# Patient Record
Sex: FEMALE | Race: BLACK OR AFRICAN AMERICAN | NOT HISPANIC OR LATINO | URBAN - METROPOLITAN AREA
[De-identification: names, ages, dates, MRNs, and addresses within clinical notes are randomized per-mention and may not be internally consistent; named-entity substitution may affect disease eponyms.]

---

## 2017-01-31 ENCOUNTER — EMERGENCY (EMERGENCY)
Facility: HOSPITAL | Age: 32
LOS: 1 days | Discharge: HOME | End: 2017-01-31
Attending: EMERGENCY MEDICINE | Admitting: EMERGENCY MEDICINE
Payer: COMMERCIAL

## 2017-01-31 VITALS
HEART RATE: 81 BPM | OXYGEN SATURATION: 100 % | TEMPERATURE: 98 F | DIASTOLIC BLOOD PRESSURE: 87 MMHG | RESPIRATION RATE: 18 BRPM | SYSTOLIC BLOOD PRESSURE: 141 MMHG

## 2017-01-31 VITALS
SYSTOLIC BLOOD PRESSURE: 102 MMHG | OXYGEN SATURATION: 100 % | DIASTOLIC BLOOD PRESSURE: 66 MMHG | RESPIRATION RATE: 18 BRPM | HEART RATE: 69 BPM | TEMPERATURE: 98 F

## 2017-01-31 DIAGNOSIS — Z98.89 OTHER SPECIFIED POSTPROCEDURAL STATES: Chronic | ICD-10-CM

## 2017-01-31 LAB
ALBUMIN SERPL ELPH-MCNC: 4.3 G/DL — SIGNIFICANT CHANGE UP (ref 3.3–5)
ALP SERPL-CCNC: 40 U/L — SIGNIFICANT CHANGE UP (ref 40–120)
ALT FLD-CCNC: 10 U/L — SIGNIFICANT CHANGE UP (ref 4–33)
APPEARANCE UR: CLEAR — SIGNIFICANT CHANGE UP
AST SERPL-CCNC: 12 U/L — SIGNIFICANT CHANGE UP (ref 4–32)
BASOPHILS # BLD AUTO: 0.02 K/UL — SIGNIFICANT CHANGE UP (ref 0–0.2)
BASOPHILS NFR BLD AUTO: 0.3 % — SIGNIFICANT CHANGE UP (ref 0–2)
BILIRUB SERPL-MCNC: 0.8 MG/DL — SIGNIFICANT CHANGE UP (ref 0.2–1.2)
BILIRUB UR-MCNC: NEGATIVE — SIGNIFICANT CHANGE UP
BLD GP AB SCN SERPL QL: NEGATIVE — SIGNIFICANT CHANGE UP
BLOOD UR QL VISUAL: HIGH
BUN SERPL-MCNC: 13 MG/DL — SIGNIFICANT CHANGE UP (ref 7–23)
CALCIUM SERPL-MCNC: 9.5 MG/DL — SIGNIFICANT CHANGE UP (ref 8.4–10.5)
CHLORIDE SERPL-SCNC: 103 MMOL/L — SIGNIFICANT CHANGE UP (ref 98–107)
CO2 SERPL-SCNC: 26 MMOL/L — SIGNIFICANT CHANGE UP (ref 22–31)
COLOR SPEC: YELLOW — SIGNIFICANT CHANGE UP
CREAT SERPL-MCNC: 0.79 MG/DL — SIGNIFICANT CHANGE UP (ref 0.5–1.3)
EOSINOPHIL # BLD AUTO: 0.07 K/UL — SIGNIFICANT CHANGE UP (ref 0–0.5)
EOSINOPHIL NFR BLD AUTO: 1.1 % — SIGNIFICANT CHANGE UP (ref 0–6)
GLUCOSE SERPL-MCNC: 91 MG/DL — SIGNIFICANT CHANGE UP (ref 70–99)
GLUCOSE UR-MCNC: NEGATIVE — SIGNIFICANT CHANGE UP
HCG SERPL-ACNC: < 5 MIU/ML — SIGNIFICANT CHANGE UP
HCT VFR BLD CALC: 41.7 % — SIGNIFICANT CHANGE UP (ref 34.5–45)
HGB BLD-MCNC: 14.1 G/DL — SIGNIFICANT CHANGE UP (ref 11.5–15.5)
IMM GRANULOCYTES NFR BLD AUTO: 0.2 % — SIGNIFICANT CHANGE UP (ref 0–1.5)
KETONES UR-MCNC: NEGATIVE — SIGNIFICANT CHANGE UP
LEUKOCYTE ESTERASE UR-ACNC: NEGATIVE — SIGNIFICANT CHANGE UP
LYMPHOCYTES # BLD AUTO: 2.32 K/UL — SIGNIFICANT CHANGE UP (ref 1–3.3)
LYMPHOCYTES # BLD AUTO: 35.9 % — SIGNIFICANT CHANGE UP (ref 13–44)
MCHC RBC-ENTMCNC: 30.2 PG — SIGNIFICANT CHANGE UP (ref 27–34)
MCHC RBC-ENTMCNC: 33.8 % — SIGNIFICANT CHANGE UP (ref 32–36)
MCV RBC AUTO: 89.3 FL — SIGNIFICANT CHANGE UP (ref 80–100)
MONOCYTES # BLD AUTO: 0.32 K/UL — SIGNIFICANT CHANGE UP (ref 0–0.9)
MONOCYTES NFR BLD AUTO: 4.9 % — SIGNIFICANT CHANGE UP (ref 2–14)
NEUTROPHILS # BLD AUTO: 3.73 K/UL — SIGNIFICANT CHANGE UP (ref 1.8–7.4)
NEUTROPHILS NFR BLD AUTO: 57.6 % — SIGNIFICANT CHANGE UP (ref 43–77)
NITRITE UR-MCNC: NEGATIVE — SIGNIFICANT CHANGE UP
PH UR: 6 — SIGNIFICANT CHANGE UP (ref 4.6–8)
PLATELET # BLD AUTO: 187 K/UL — SIGNIFICANT CHANGE UP (ref 150–400)
PMV BLD: 11 FL — SIGNIFICANT CHANGE UP (ref 7–13)
POTASSIUM SERPL-MCNC: 4.3 MMOL/L — SIGNIFICANT CHANGE UP (ref 3.5–5.3)
POTASSIUM SERPL-SCNC: 4.3 MMOL/L — SIGNIFICANT CHANGE UP (ref 3.5–5.3)
PROT SERPL-MCNC: 6.9 G/DL — SIGNIFICANT CHANGE UP (ref 6–8.3)
PROT UR-MCNC: NEGATIVE — SIGNIFICANT CHANGE UP
RBC # BLD: 4.67 M/UL — SIGNIFICANT CHANGE UP (ref 3.8–5.2)
RBC # FLD: 11.6 % — SIGNIFICANT CHANGE UP (ref 10.3–14.5)
RBC CASTS # UR COMP ASSIST: SIGNIFICANT CHANGE UP (ref 0–?)
RH IG SCN BLD-IMP: POSITIVE — SIGNIFICANT CHANGE UP
SODIUM SERPL-SCNC: 139 MMOL/L — SIGNIFICANT CHANGE UP (ref 135–145)
SP GR SPEC: 1.02 — SIGNIFICANT CHANGE UP (ref 1–1.03)
SQUAMOUS # UR AUTO: SIGNIFICANT CHANGE UP
UROBILINOGEN FLD QL: NORMAL E.U. — SIGNIFICANT CHANGE UP (ref 0.1–0.2)
WBC # BLD: 6.47 K/UL — SIGNIFICANT CHANGE UP (ref 3.8–10.5)
WBC # FLD AUTO: 6.47 K/UL — SIGNIFICANT CHANGE UP (ref 3.8–10.5)
WBC UR QL: SIGNIFICANT CHANGE UP (ref 0–?)

## 2017-01-31 PROCEDURE — 99284 EMERGENCY DEPT VISIT MOD MDM: CPT

## 2017-01-31 PROCEDURE — 76830 TRANSVAGINAL US NON-OB: CPT | Mod: 26

## 2017-01-31 RX ORDER — ACETAMINOPHEN 500 MG
650 TABLET ORAL ONCE
Qty: 0 | Refills: 0 | Status: COMPLETED | OUTPATIENT
Start: 2017-01-31 | End: 2017-01-31

## 2017-01-31 RX ORDER — SODIUM CHLORIDE 9 MG/ML
1000 INJECTION INTRAMUSCULAR; INTRAVENOUS; SUBCUTANEOUS ONCE
Qty: 0 | Refills: 0 | Status: COMPLETED | OUTPATIENT
Start: 2017-01-31 | End: 2017-01-31

## 2017-01-31 RX ADMIN — Medication 650 MILLIGRAM(S): at 16:20

## 2017-01-31 RX ADMIN — SODIUM CHLORIDE 1000 MILLILITER(S): 9 INJECTION INTRAMUSCULAR; INTRAVENOUS; SUBCUTANEOUS at 13:50

## 2017-01-31 RX ADMIN — Medication 650 MILLIGRAM(S): at 13:50

## 2017-01-31 NOTE — ED ADULT NURSE NOTE - OBJECTIVE STATEMENT
pt states her period was 6 days late and when it started there wasn't very much bleeding. pt states she took home pregnancy test and it was negative. c.o. nausea. denies vomiting, diarrhea and fevers. abd soft, c.o. "pressure " upon palpation. most tender on LLQ. sl placed labs sent.

## 2017-01-31 NOTE — ED PROVIDER NOTE - NS ED MD SCRIBE ATTENDING SCRIBE SECTIONS
REVIEW OF SYSTEMS/HIV/VITAL SIGNS( Pullset)/HISTORY OF PRESENT ILLNESS/PHYSICAL EXAM/PAST MEDICAL/SURGICAL/SOCIAL HISTORY/DISPOSITION

## 2017-01-31 NOTE — ED PROVIDER NOTE - OBJECTIVE STATEMENT
32 y/o F pt () w/ PMHx of Anxiety, Breech presentation and Ovarian cyst c/o delayed menstrual cycle by 6 days w/ nausea, vaginal spotting, left pelvic pain and vaginal discharge (pink in color) since yesterday. Pt states current bleeding is unlike her nml menstrual period. One sexual partner w/ no protections used. Has had negative home pregnancy test, but concerned due to unusual bleeding. No h/o ectopic pregnancy. Denies fever, chills, dysuria, vomiting, CP, SOB, cough or any other complaints. Allergic to Penicillin and Amoxicillin.

## 2017-01-31 NOTE — ED PROVIDER NOTE - PROGRESS NOTE DETAILS
William FELICIANO- pt reassured of no serious condition, likely dysmenorrhea and is not pregnant and Pt feels better, advised to take motrin and tylenol as needed for pain and return promptly in c/o worsening symptoms

## 2017-04-19 ENCOUNTER — EMERGENCY (EMERGENCY)
Facility: HOSPITAL | Age: 32
LOS: 0 days | Discharge: HOME | End: 2017-04-19
Attending: EMERGENCY MEDICINE
Payer: COMMERCIAL

## 2017-04-19 VITALS
HEART RATE: 74 BPM | HEIGHT: 63 IN | WEIGHT: 141.98 LBS | SYSTOLIC BLOOD PRESSURE: 124 MMHG | RESPIRATION RATE: 19 BRPM | OXYGEN SATURATION: 99 % | TEMPERATURE: 99 F | DIASTOLIC BLOOD PRESSURE: 74 MMHG

## 2017-04-19 VITALS
OXYGEN SATURATION: 98 % | HEART RATE: 88 BPM | TEMPERATURE: 98 F | SYSTOLIC BLOOD PRESSURE: 111 MMHG | RESPIRATION RATE: 16 BRPM | DIASTOLIC BLOOD PRESSURE: 63 MMHG

## 2017-04-19 DIAGNOSIS — R10.9 UNSPECIFIED ABDOMINAL PAIN: ICD-10-CM

## 2017-04-19 DIAGNOSIS — Z88.1 ALLERGY STATUS TO OTHER ANTIBIOTIC AGENTS STATUS: ICD-10-CM

## 2017-04-19 DIAGNOSIS — Z98.89 OTHER SPECIFIED POSTPROCEDURAL STATES: Chronic | ICD-10-CM

## 2017-04-19 DIAGNOSIS — R10.32 LEFT LOWER QUADRANT PAIN: ICD-10-CM

## 2017-04-19 LAB
ALBUMIN SERPL ELPH-MCNC: 4 G/DL — SIGNIFICANT CHANGE UP (ref 3.3–5)
ALP SERPL-CCNC: 47 U/L — SIGNIFICANT CHANGE UP (ref 40–120)
ALT FLD-CCNC: 24 U/L — SIGNIFICANT CHANGE UP (ref 12–78)
ANION GAP SERPL CALC-SCNC: 11 MMOL/L — SIGNIFICANT CHANGE UP (ref 5–17)
APPEARANCE UR: CLEAR — SIGNIFICANT CHANGE UP
APTT BLD: 32.6 SEC — SIGNIFICANT CHANGE UP (ref 27.5–37.4)
AST SERPL-CCNC: 15 U/L — SIGNIFICANT CHANGE UP (ref 15–37)
BASOPHILS # BLD AUTO: 0.1 K/UL — SIGNIFICANT CHANGE UP (ref 0–0.2)
BASOPHILS NFR BLD AUTO: 1.1 % — SIGNIFICANT CHANGE UP (ref 0–2)
BILIRUB SERPL-MCNC: 0.6 MG/DL — SIGNIFICANT CHANGE UP (ref 0.2–1.2)
BILIRUB UR-MCNC: NEGATIVE — SIGNIFICANT CHANGE UP
BUN SERPL-MCNC: 14 MG/DL — SIGNIFICANT CHANGE UP (ref 7–23)
CALCIUM SERPL-MCNC: 8.7 MG/DL — SIGNIFICANT CHANGE UP (ref 8.5–10.1)
CHLORIDE SERPL-SCNC: 105 MMOL/L — SIGNIFICANT CHANGE UP (ref 96–108)
CO2 SERPL-SCNC: 25 MMOL/L — SIGNIFICANT CHANGE UP (ref 22–31)
COLOR SPEC: YELLOW — SIGNIFICANT CHANGE UP
CREAT SERPL-MCNC: 0.77 MG/DL — SIGNIFICANT CHANGE UP (ref 0.5–1.3)
DIFF PNL FLD: NEGATIVE — SIGNIFICANT CHANGE UP
EOSINOPHIL # BLD AUTO: 0.1 K/UL — SIGNIFICANT CHANGE UP (ref 0–0.5)
EOSINOPHIL NFR BLD AUTO: 0.9 % — SIGNIFICANT CHANGE UP (ref 0–6)
GLUCOSE SERPL-MCNC: 96 MG/DL — SIGNIFICANT CHANGE UP (ref 70–99)
GLUCOSE UR QL: NEGATIVE MG/DL — SIGNIFICANT CHANGE UP
HCG UR QL: NEGATIVE — SIGNIFICANT CHANGE UP
HCT VFR BLD CALC: 39.2 % — SIGNIFICANT CHANGE UP (ref 34.5–45)
HGB BLD-MCNC: 13.9 G/DL — SIGNIFICANT CHANGE UP (ref 11.5–15.5)
INR BLD: 0.97 RATIO — SIGNIFICANT CHANGE UP (ref 0.88–1.16)
KETONES UR-MCNC: NEGATIVE — SIGNIFICANT CHANGE UP
LEUKOCYTE ESTERASE UR-ACNC: NEGATIVE — SIGNIFICANT CHANGE UP
LIDOCAIN IGE QN: 91 U/L — SIGNIFICANT CHANGE UP (ref 73–393)
LYMPHOCYTES # BLD AUTO: 3.3 K/UL — SIGNIFICANT CHANGE UP (ref 1–3.3)
LYMPHOCYTES # BLD AUTO: 30.8 % — SIGNIFICANT CHANGE UP (ref 13–44)
MCHC RBC-ENTMCNC: 31 PG — SIGNIFICANT CHANGE UP (ref 27–34)
MCHC RBC-ENTMCNC: 35.5 GM/DL — SIGNIFICANT CHANGE UP (ref 32–36)
MCV RBC AUTO: 87.2 FL — SIGNIFICANT CHANGE UP (ref 80–100)
MONOCYTES # BLD AUTO: 0.7 K/UL — SIGNIFICANT CHANGE UP (ref 0–0.9)
MONOCYTES NFR BLD AUTO: 6.3 % — SIGNIFICANT CHANGE UP (ref 2–14)
NEUTROPHILS # BLD AUTO: 6.5 K/UL — SIGNIFICANT CHANGE UP (ref 1.8–7.4)
NEUTROPHILS NFR BLD AUTO: 61 % — SIGNIFICANT CHANGE UP (ref 43–77)
NITRITE UR-MCNC: NEGATIVE — SIGNIFICANT CHANGE UP
PH UR: 7 — SIGNIFICANT CHANGE UP (ref 5–8)
PLATELET # BLD AUTO: 193 K/UL — SIGNIFICANT CHANGE UP (ref 150–400)
POTASSIUM SERPL-MCNC: 3.6 MMOL/L — SIGNIFICANT CHANGE UP (ref 3.5–5.3)
POTASSIUM SERPL-SCNC: 3.6 MMOL/L — SIGNIFICANT CHANGE UP (ref 3.5–5.3)
PROT SERPL-MCNC: 7.1 GM/DL — SIGNIFICANT CHANGE UP (ref 6–8.3)
PROT UR-MCNC: NEGATIVE MG/DL — SIGNIFICANT CHANGE UP
PROTHROM AB SERPL-ACNC: 10.6 SEC — SIGNIFICANT CHANGE UP (ref 9.8–12.7)
RBC # BLD: 4.5 M/UL — SIGNIFICANT CHANGE UP (ref 3.8–5.2)
RBC # FLD: 11.6 % — SIGNIFICANT CHANGE UP (ref 11–15)
SODIUM SERPL-SCNC: 141 MMOL/L — SIGNIFICANT CHANGE UP (ref 135–145)
SP GR SPEC: 1.01 — SIGNIFICANT CHANGE UP (ref 1.01–1.02)
UROBILINOGEN FLD QL: 4 MG/DL
WBC # BLD: 10.7 K/UL — HIGH (ref 3.8–10.5)
WBC # FLD AUTO: 10.7 K/UL — HIGH (ref 3.8–10.5)

## 2017-04-19 PROCEDURE — 99284 EMERGENCY DEPT VISIT MOD MDM: CPT

## 2017-04-19 PROCEDURE — 76856 US EXAM PELVIC COMPLETE: CPT | Mod: 26

## 2017-04-19 RX ORDER — KETOROLAC TROMETHAMINE 30 MG/ML
15 SYRINGE (ML) INJECTION ONCE
Qty: 0 | Refills: 0 | Status: DISCONTINUED | OUTPATIENT
Start: 2017-04-19 | End: 2017-04-19

## 2017-04-19 RX ORDER — ACETAMINOPHEN 500 MG
650 TABLET ORAL ONCE
Qty: 0 | Refills: 0 | Status: COMPLETED | OUTPATIENT
Start: 2017-04-19 | End: 2017-04-19

## 2017-04-19 RX ORDER — CEFTRIAXONE 500 MG/1
250 INJECTION, POWDER, FOR SOLUTION INTRAMUSCULAR; INTRAVENOUS ONCE
Qty: 0 | Refills: 0 | Status: COMPLETED | OUTPATIENT
Start: 2017-04-19 | End: 2017-04-19

## 2017-04-19 RX ADMIN — Medication 15 MILLIGRAM(S): at 18:20

## 2017-04-19 RX ADMIN — Medication 650 MILLIGRAM(S): at 20:28

## 2017-04-19 RX ADMIN — Medication 650 MILLIGRAM(S): at 19:58

## 2017-04-19 RX ADMIN — Medication 100 MILLIGRAM(S): at 19:58

## 2017-04-19 RX ADMIN — Medication 15 MILLIGRAM(S): at 19:08

## 2017-04-19 RX ADMIN — CEFTRIAXONE 250 MILLIGRAM(S): 500 INJECTION, POWDER, FOR SOLUTION INTRAMUSCULAR; INTRAVENOUS at 19:58

## 2017-04-19 NOTE — ED ADULT NURSE NOTE - OBJECTIVE STATEMENT
Pt verbalized abd & flank pain since last week. Pt denies N/V/D. Frequent soft BMs with poor appetite. Pt reports severe nose bleed on Monday, which last for several minutes and saturated 2-3 napkins.

## 2017-04-19 NOTE — ED ADULT TRIAGE NOTE - CHIEF COMPLAINT QUOTE
pt complaining of right lower quadrant abdominal pain times 1 week. pt denies nausea, vomiting or diarrhea.

## 2017-04-19 NOTE — ED PROVIDER NOTE - OBJECTIVE STATEMENT
Pt. present to ED c/o lower abdominal pain on and off for the past week. Pain is sharp. No vaginal discharge. No vaginal bleeding. No dysuria. +Increase urinary frequency. Pt's pain is stronger in her suprapubic and LLQ region. No hx of STI. Pt. is  with 3 . Pt. also c/o right flank pain for the past 4-5 days.

## 2017-04-19 NOTE — ED PROVIDER NOTE - PROGRESS NOTE DETAILS
Pt. had pelvic exam that showed +CMT. Minimal yellow discharge noted. Pain mostly to Left adnexa. GC culture sent. Pt. may need to be tx for PID. Waiting for Pt. to go to [facility] Pt. will be tx for PID with Rocephin IM and Doxy. Pt. states that she had a rash when she was a baby with amoxillin. I believe that the benefit of proper treatment  outweighs the risk of a possible allergic reaction. Low cross reactivity with third generations cephalosporins.  Pt. still waiting to go to [facility] Case endorsed to Dr. Dillard. She will follow up [facility] Pt endorsed by Dr. Richard, pending US results.  US demonstrated hemorrhagic ovarian cyst, no TOA, no torsion.  Discussed results and outcome of testing with the patient, given copy as well.  Patient advised to please follow up with their primary care doctor within the next 24 hours and return to the Emergency Department for worsening symptoms or any other concerns.  Patient advised that their doctor may call  to follow up on the specific results of the tests performed today in the emergency department. Given GYN fu as well.

## 2017-04-20 LAB
C TRACH RRNA SPEC QL NAA+PROBE: SIGNIFICANT CHANGE UP
N GONORRHOEA RRNA SPEC QL NAA+PROBE: SIGNIFICANT CHANGE UP
SPECIMEN SOURCE: SIGNIFICANT CHANGE UP

## 2017-06-07 ENCOUNTER — EMERGENCY (EMERGENCY)
Facility: HOSPITAL | Age: 32
LOS: 1 days | Discharge: HOME | End: 2017-06-07
Attending: EMERGENCY MEDICINE | Admitting: EMERGENCY MEDICINE
Payer: COMMERCIAL

## 2017-06-07 VITALS
OXYGEN SATURATION: 100 % | HEART RATE: 87 BPM | SYSTOLIC BLOOD PRESSURE: 146 MMHG | DIASTOLIC BLOOD PRESSURE: 106 MMHG | TEMPERATURE: 98 F | RESPIRATION RATE: 16 BRPM

## 2017-06-07 DIAGNOSIS — Z98.891 HISTORY OF UTERINE SCAR FROM PREVIOUS SURGERY: Chronic | ICD-10-CM

## 2017-06-07 DIAGNOSIS — Z98.89 OTHER SPECIFIED POSTPROCEDURAL STATES: Chronic | ICD-10-CM

## 2017-06-07 LAB
ALBUMIN SERPL ELPH-MCNC: 4.6 G/DL — SIGNIFICANT CHANGE UP (ref 3.3–5)
ALP SERPL-CCNC: 43 U/L — SIGNIFICANT CHANGE UP (ref 40–120)
ALT FLD-CCNC: 13 U/L — SIGNIFICANT CHANGE UP (ref 4–33)
AST SERPL-CCNC: 14 U/L — SIGNIFICANT CHANGE UP (ref 4–32)
BASOPHILS # BLD AUTO: 0.02 K/UL — SIGNIFICANT CHANGE UP (ref 0–0.2)
BASOPHILS NFR BLD AUTO: 0.2 % — SIGNIFICANT CHANGE UP (ref 0–2)
BILIRUB SERPL-MCNC: 0.5 MG/DL — SIGNIFICANT CHANGE UP (ref 0.2–1.2)
BUN SERPL-MCNC: 15 MG/DL — SIGNIFICANT CHANGE UP (ref 7–23)
CALCIUM SERPL-MCNC: 9.5 MG/DL — SIGNIFICANT CHANGE UP (ref 8.4–10.5)
CHLORIDE SERPL-SCNC: 100 MMOL/L — SIGNIFICANT CHANGE UP (ref 98–107)
CO2 SERPL-SCNC: 24 MMOL/L — SIGNIFICANT CHANGE UP (ref 22–31)
CREAT SERPL-MCNC: 0.81 MG/DL — SIGNIFICANT CHANGE UP (ref 0.5–1.3)
EOSINOPHIL # BLD AUTO: 0.19 K/UL — SIGNIFICANT CHANGE UP (ref 0–0.5)
EOSINOPHIL NFR BLD AUTO: 1.9 % — SIGNIFICANT CHANGE UP (ref 0–6)
GLUCOSE SERPL-MCNC: 97 MG/DL — SIGNIFICANT CHANGE UP (ref 70–99)
HCG SERPL-ACNC: < 5 MIU/ML — SIGNIFICANT CHANGE UP
HCT VFR BLD CALC: 41.6 % — SIGNIFICANT CHANGE UP (ref 34.5–45)
HGB BLD-MCNC: 13.9 G/DL — SIGNIFICANT CHANGE UP (ref 11.5–15.5)
IMM GRANULOCYTES NFR BLD AUTO: 0.6 % — SIGNIFICANT CHANGE UP (ref 0–1.5)
LIDOCAIN IGE QN: 27.5 U/L — SIGNIFICANT CHANGE UP (ref 7–60)
LYMPHOCYTES # BLD AUTO: 3.31 K/UL — HIGH (ref 1–3.3)
LYMPHOCYTES # BLD AUTO: 33.8 % — SIGNIFICANT CHANGE UP (ref 13–44)
MCHC RBC-ENTMCNC: 30.5 PG — SIGNIFICANT CHANGE UP (ref 27–34)
MCHC RBC-ENTMCNC: 33.4 % — SIGNIFICANT CHANGE UP (ref 32–36)
MCV RBC AUTO: 91.4 FL — SIGNIFICANT CHANGE UP (ref 80–100)
MONOCYTES # BLD AUTO: 0.67 K/UL — SIGNIFICANT CHANGE UP (ref 0–0.9)
MONOCYTES NFR BLD AUTO: 6.8 % — SIGNIFICANT CHANGE UP (ref 2–14)
NEUTROPHILS # BLD AUTO: 5.55 K/UL — SIGNIFICANT CHANGE UP (ref 1.8–7.4)
NEUTROPHILS NFR BLD AUTO: 56.7 % — SIGNIFICANT CHANGE UP (ref 43–77)
PLATELET # BLD AUTO: 205 K/UL — SIGNIFICANT CHANGE UP (ref 150–400)
PMV BLD: 11.4 FL — SIGNIFICANT CHANGE UP (ref 7–13)
POTASSIUM SERPL-MCNC: 3.9 MMOL/L — SIGNIFICANT CHANGE UP (ref 3.5–5.3)
POTASSIUM SERPL-SCNC: 3.9 MMOL/L — SIGNIFICANT CHANGE UP (ref 3.5–5.3)
PROT SERPL-MCNC: 7.2 G/DL — SIGNIFICANT CHANGE UP (ref 6–8.3)
RBC # BLD: 4.55 M/UL — SIGNIFICANT CHANGE UP (ref 3.8–5.2)
RBC # FLD: 12.1 % — SIGNIFICANT CHANGE UP (ref 10.3–14.5)
SODIUM SERPL-SCNC: 140 MMOL/L — SIGNIFICANT CHANGE UP (ref 135–145)
WBC # BLD: 9.8 K/UL — SIGNIFICANT CHANGE UP (ref 3.8–10.5)
WBC # FLD AUTO: 9.8 K/UL — SIGNIFICANT CHANGE UP (ref 3.8–10.5)

## 2017-06-07 PROCEDURE — 99285 EMERGENCY DEPT VISIT HI MDM: CPT

## 2017-06-07 RX ORDER — MORPHINE SULFATE 50 MG/1
4 CAPSULE, EXTENDED RELEASE ORAL ONCE
Qty: 0 | Refills: 0 | Status: DISCONTINUED | OUTPATIENT
Start: 2017-06-07 | End: 2017-06-07

## 2017-06-07 RX ORDER — SODIUM CHLORIDE 9 MG/ML
1000 INJECTION INTRAMUSCULAR; INTRAVENOUS; SUBCUTANEOUS ONCE
Qty: 0 | Refills: 0 | Status: COMPLETED | OUTPATIENT
Start: 2017-06-07 | End: 2017-06-07

## 2017-06-07 RX ADMIN — MORPHINE SULFATE 4 MILLIGRAM(S): 50 CAPSULE, EXTENDED RELEASE ORAL at 23:42

## 2017-06-07 RX ADMIN — SODIUM CHLORIDE 1000 MILLILITER(S): 9 INJECTION INTRAMUSCULAR; INTRAVENOUS; SUBCUTANEOUS at 23:42

## 2017-06-07 NOTE — ED PROVIDER NOTE - OBJECTIVE STATEMENT
att f with ovarian cysts, s/p , presents with lower abd pain since monday, progressively worse. Vomited on tuesday, but decreased po intake. No fever. No diarrhea. Normal BM yesterday. No cp, no sob. No dysuria. LMP . No vaginal discharge.

## 2017-06-07 NOTE — ED ADULT NURSE NOTE - OBJECTIVE STATEMENT
Pt received in #15, aaox3 with c/o lower abd pain, nausea and vomiting x 3 days. States pain is progressively worse since onset. Emesis contains recently consumed food. Denies bilious or bloody matter. Last BM yesterday, normal. LMP 5/24/17. Denies fever, chills, urinary symptoms. Hx of ovarian cyst but states pain is "totally different". IV established, labs sent.

## 2017-06-07 NOTE — ED PROVIDER NOTE - PROGRESS NOTE DETAILS
Attending Note (Juan): signed out pending results. Plan was to perform CT if US non diagnostic and if CT non diagnostic treat for PID.  US and CT non diagnostic. will treat for PID.  harrison

## 2017-06-07 NOTE — ED ADULT TRIAGE NOTE - CHIEF COMPLAINT QUOTE
Pt arrives to ED c/o lower abd pain/nausea - increasing over past 3 days.  Pt reports vomited once on Tuesday, none today.  Taking Tylenol PRN w/o relief.  Denies dysuria/hematuria/hematemesis, and is having regular BMs/Flatus.  Pt reports  , no other abd surgeries. PMHx -ovarian cysts, reports this pain is similar to cysts but worse.  Appearing in NAD in triage. Pt arrives to ED c/o lower abd pain/nausea - increasing over past 3 days.  Pt reports vomited once on Tuesday, none today.  Taking Tylenol PRN w/o relief.  Denies dysuria/hematuria/vag bleeding/hematemesis, and is having regular BMs/Flatus.  Pt reports  , no other abd surgeries. PMHx -ovarian cysts, reports this pain is similar to cysts but worse. LMP .  Appearing in NAD in triage.

## 2017-06-07 NOTE — ED PROVIDER NOTE - MEDICAL DECISION MAKING DETAILS
att f with ovarian cysts, s/p , presents with lower abd pain since monday, progressively worse. Vomited on tuesday, but decreased po intake. No fever. No diarrhea. Normal BM yesterday. No cp, no sob. No dysuria. LMP . No vaginal discharge. Exam as above. sig lower abd ttp, + TTP on pelvic diffusely, c/g sent. ua, preg, tvus, if neg get ct, if neg workup, consider pid tx. Will sign out patient.

## 2017-06-07 NOTE — ED ADULT NURSE NOTE - CHIEF COMPLAINT QUOTE
Pt arrives to ED c/o lower abd pain/nausea - increasing over past 3 days.  Pt reports vomited once on Tuesday, none today.  Taking Tylenol PRN w/o relief.  Denies dysuria/hematuria/vag bleeding/hematemesis, and is having regular BMs/Flatus.  Pt reports  , no other abd surgeries. PMHx -ovarian cysts, reports this pain is similar to cysts but worse. LMP .  Appearing in NAD in triage.

## 2017-06-08 VITALS
DIASTOLIC BLOOD PRESSURE: 67 MMHG | TEMPERATURE: 98 F | OXYGEN SATURATION: 100 % | SYSTOLIC BLOOD PRESSURE: 106 MMHG | RESPIRATION RATE: 14 BRPM | HEART RATE: 72 BPM

## 2017-06-08 LAB
APPEARANCE UR: CLEAR — SIGNIFICANT CHANGE UP
BILIRUB UR-MCNC: NEGATIVE — SIGNIFICANT CHANGE UP
BLOOD UR QL VISUAL: NEGATIVE — SIGNIFICANT CHANGE UP
C TRACH RRNA SPEC QL NAA+PROBE: SIGNIFICANT CHANGE UP
COLOR SPEC: YELLOW — SIGNIFICANT CHANGE UP
GLUCOSE UR-MCNC: NEGATIVE — SIGNIFICANT CHANGE UP
KETONES UR-MCNC: NEGATIVE — SIGNIFICANT CHANGE UP
LEUKOCYTE ESTERASE UR-ACNC: NEGATIVE — SIGNIFICANT CHANGE UP
MUCOUS THREADS # UR AUTO: SIGNIFICANT CHANGE UP
N GONORRHOEA RRNA SPEC QL NAA+PROBE: SIGNIFICANT CHANGE UP
NITRITE UR-MCNC: NEGATIVE — SIGNIFICANT CHANGE UP
PH UR: 6.5 — SIGNIFICANT CHANGE UP (ref 4.6–8)
PROT UR-MCNC: 20 — SIGNIFICANT CHANGE UP
RBC CASTS # UR COMP ASSIST: SIGNIFICANT CHANGE UP (ref 0–?)
SP GR SPEC: 1.03 — SIGNIFICANT CHANGE UP (ref 1–1.03)
SPECIMEN SOURCE: SIGNIFICANT CHANGE UP
SQUAMOUS # UR AUTO: SIGNIFICANT CHANGE UP
UROBILINOGEN FLD QL: 1 E.U. — SIGNIFICANT CHANGE UP (ref 0.1–0.2)
WBC UR QL: SIGNIFICANT CHANGE UP (ref 0–?)

## 2017-06-08 PROCEDURE — 74177 CT ABD & PELVIS W/CONTRAST: CPT | Mod: 26

## 2017-06-08 PROCEDURE — 76830 TRANSVAGINAL US NON-OB: CPT | Mod: 26

## 2017-06-08 RX ORDER — CEFTRIAXONE 500 MG/1
250 INJECTION, POWDER, FOR SOLUTION INTRAMUSCULAR; INTRAVENOUS ONCE
Qty: 0 | Refills: 0 | Status: COMPLETED | OUTPATIENT
Start: 2017-06-08 | End: 2017-06-08

## 2017-06-08 RX ORDER — KETOROLAC TROMETHAMINE 30 MG/ML
15 SYRINGE (ML) INJECTION ONCE
Qty: 0 | Refills: 0 | Status: DISCONTINUED | OUTPATIENT
Start: 2017-06-08 | End: 2017-06-08

## 2017-06-08 RX ORDER — MORPHINE SULFATE 50 MG/1
4 CAPSULE, EXTENDED RELEASE ORAL ONCE
Qty: 0 | Refills: 0 | Status: DISCONTINUED | OUTPATIENT
Start: 2017-06-08 | End: 2017-06-08

## 2017-06-08 RX ADMIN — Medication 100 MILLIGRAM(S): at 07:42

## 2017-06-08 RX ADMIN — MORPHINE SULFATE 4 MILLIGRAM(S): 50 CAPSULE, EXTENDED RELEASE ORAL at 04:28

## 2017-06-08 RX ADMIN — MORPHINE SULFATE 4 MILLIGRAM(S): 50 CAPSULE, EXTENDED RELEASE ORAL at 00:00

## 2017-06-08 RX ADMIN — MORPHINE SULFATE 4 MILLIGRAM(S): 50 CAPSULE, EXTENDED RELEASE ORAL at 04:11

## 2017-06-08 RX ADMIN — Medication 15 MILLIGRAM(S): at 07:42

## 2017-06-08 RX ADMIN — CEFTRIAXONE 250 MILLIGRAM(S): 500 INJECTION, POWDER, FOR SOLUTION INTRAMUSCULAR; INTRAVENOUS at 07:43

## 2017-06-08 NOTE — ED ADULT NURSE REASSESSMENT NOTE - CONDITION
Spoke with pt in depth regarding listed allergies. Pt states that she took the exact same medications in April 2017 without any reaction.

## 2017-10-19 ENCOUNTER — EMERGENCY (EMERGENCY)
Facility: HOSPITAL | Age: 32
LOS: 1 days | Discharge: HOME | End: 2017-10-19
Attending: EMERGENCY MEDICINE | Admitting: EMERGENCY MEDICINE
Payer: COMMERCIAL

## 2017-10-19 VITALS
OXYGEN SATURATION: 100 % | DIASTOLIC BLOOD PRESSURE: 98 MMHG | HEART RATE: 84 BPM | RESPIRATION RATE: 16 BRPM | SYSTOLIC BLOOD PRESSURE: 138 MMHG | TEMPERATURE: 98 F

## 2017-10-19 DIAGNOSIS — Z98.89 OTHER SPECIFIED POSTPROCEDURAL STATES: Chronic | ICD-10-CM

## 2017-10-19 DIAGNOSIS — Z98.891 HISTORY OF UTERINE SCAR FROM PREVIOUS SURGERY: Chronic | ICD-10-CM

## 2017-10-19 PROCEDURE — 99283 EMERGENCY DEPT VISIT LOW MDM: CPT

## 2017-10-19 RX ORDER — LIDOCAINE 4 G/100G
1 CREAM TOPICAL ONCE
Qty: 0 | Refills: 0 | Status: COMPLETED | OUTPATIENT
Start: 2017-10-19 | End: 2017-10-19

## 2017-10-19 RX ORDER — IBUPROFEN 200 MG
600 TABLET ORAL ONCE
Qty: 0 | Refills: 0 | Status: COMPLETED | OUTPATIENT
Start: 2017-10-19 | End: 2017-10-19

## 2017-10-19 RX ADMIN — Medication 600 MILLIGRAM(S): at 11:53

## 2017-10-19 RX ADMIN — LIDOCAINE 1 PATCH: 4 CREAM TOPICAL at 11:53

## 2017-10-19 NOTE — ED PROVIDER NOTE - CARE PLAN
Principal Discharge DX:	Arm pain  Instructions for follow-up, activity and diet:	1. You were seen for left arm pain. You were given motrin and valium for your pain. You had no neurological dysfunction on exam.   2. Take motrin or ibuprofen over the counter with food as needed for pain. Put a hot pack on the areas of pain as needed.  3. Follow up with a primary care doctor and orthopedic surgeon within 48 hours. Follow up with a gastrointestinal doctor for the blood in your stool.  4. Return immediately to the emergency department if you have slurred speech, a headache, dizziness, lightheadedness, fainting, numbness, tingling, inability to move your arm or leg, chest pain, or shortness of breath, or persistent or increased bleeding.

## 2017-10-19 NOTE — ED PROVIDER NOTE - MEDICAL DECISION MAKING DETAILS
32 yo F presenting with 1 day hx of atraumatic L arm pain and L hand numbness. On exam, pt is hemodynamically stable, pt has no focal neurological deficits. Pt will be discharged with ortho and PCP follow up.

## 2017-10-19 NOTE — ED ADULT TRIAGE NOTE - CHIEF COMPLAINT QUOTE
pt states that she started having pain to left hand that radiates up left arm since last night, denies trauma. Pt in NAD.  No PMH, states that she only take BCP

## 2017-10-19 NOTE — ED PROVIDER NOTE - PLAN OF CARE
1. You were seen for left arm pain. You were given motrin and valium for your pain. You had no neurological dysfunction on exam.   2. Take motrin or ibuprofen over the counter with food as needed for pain. Put a hot pack on the areas of pain as needed.  3. Follow up with a primary care doctor and orthopedic surgeon within 48 hours. Follow up with a gastrointestinal doctor for the blood in your stool.  4. Return immediately to the emergency department if you have slurred speech, a headache, dizziness, lightheadedness, fainting, numbness, tingling, inability to move your arm or leg, chest pain, or shortness of breath, or persistent or increased bleeding.

## 2017-10-19 NOTE — ED PROVIDER NOTE - ATTENDING CONTRIBUTION TO CARE
agree with resident name  31 yr old female who presents with left paracervical neck pain radiating to the left arm.  Denies weakness.  Denies trauma.  Denies gait instability.    PE: well appearing; VSS; neck supple; muscle spasm; ctab/l; s1 s2 no m/r/g abd: soft/NT/ND ext: no edema Neuro: CNs intact 5/5 motor UE and LE; sensation intact;
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
## 2017-11-28 ENCOUNTER — EMERGENCY (EMERGENCY)
Facility: HOSPITAL | Age: 32
LOS: 1 days | Discharge: HOME | End: 2017-11-28
Admitting: EMERGENCY MEDICINE
Payer: COMMERCIAL

## 2017-11-28 VITALS
TEMPERATURE: 98 F | DIASTOLIC BLOOD PRESSURE: 75 MMHG | SYSTOLIC BLOOD PRESSURE: 136 MMHG | RESPIRATION RATE: 18 BRPM | OXYGEN SATURATION: 98 % | HEART RATE: 83 BPM

## 2017-11-28 VITALS
TEMPERATURE: 98 F | RESPIRATION RATE: 18 BRPM | OXYGEN SATURATION: 120 % | SYSTOLIC BLOOD PRESSURE: 113 MMHG | DIASTOLIC BLOOD PRESSURE: 78 MMHG | HEART RATE: 74 BPM

## 2017-11-28 DIAGNOSIS — Z98.89 OTHER SPECIFIED POSTPROCEDURAL STATES: Chronic | ICD-10-CM

## 2017-11-28 DIAGNOSIS — Z98.891 HISTORY OF UTERINE SCAR FROM PREVIOUS SURGERY: Chronic | ICD-10-CM

## 2017-11-28 PROCEDURE — 99283 EMERGENCY DEPT VISIT LOW MDM: CPT

## 2017-11-28 PROCEDURE — 73660 X-RAY EXAM OF TOE(S): CPT | Mod: 26,LT

## 2017-11-28 RX ORDER — IBUPROFEN 200 MG
600 TABLET ORAL ONCE
Qty: 0 | Refills: 0 | Status: COMPLETED | OUTPATIENT
Start: 2017-11-28 | End: 2017-11-28

## 2017-11-28 RX ADMIN — Medication 600 MILLIGRAM(S): at 14:24

## 2017-11-28 NOTE — ED PROVIDER NOTE - LOWER EXTREMITY EXAM, LEFT
TENDERNESS/+TTP at anterior shin with dorsiflexion of foot similar to shin splints. No deformities, FROM, no swelling, no instability, no discoloration

## 2017-11-28 NOTE — ED PROVIDER NOTE - PROGRESS NOTE DETAILS
TORI Vicente: Pt pain well controlled, spoke to radiology, 4 more reads in front of the patient, then will post.

## 2017-11-28 NOTE — ED PROVIDER NOTE - OBJECTIVE STATEMENT
32 year old female, no PMHx, presents to the ED complaining of left first toe pain and left lower leg pain. Patient states on Friday of last week she went for a pedicure, they accidentally cut her toe and placed a substance on it to stop the bleeding. The pain became more intense and on Saturday and Sunday when she took a shower the toe began to bleed again. She was seen at an urgent care center on Sunday, they expressed pus from the cut, and put her on Doxycycline and in a hard soled shoe. The patient wanted to return to work so she wasn't wearing the shoe and instead was walking in closed shoes- sneakers and uggs and felt herself "walking funny". Today she presents because her toe still hurts but shes also having pain radiating up her shin and into her knee after ambulating all day. She states the nail looks better, no redness, swelling, drainage, streaking, f/c/n/v/d, trauma or other complaints.

## 2017-11-28 NOTE — ED PROVIDER NOTE - CARE PLAN
Principal Discharge DX:	Leg pain  Instructions for follow-up, activity and diet:	Rest, drink plenty of fluids  Advance activity as tolerated  Apply heat to the leg 15 min on/15 min off  Take Motrin 600mg every 6-8 hours with food as needed  Continue all previously prescribed medications as directed  Follow up with your PMD 2-3 days- bring copies of your results  Return to the ER for worsening

## 2017-11-28 NOTE — ED PROVIDER NOTE - CHPI ED SYMPTOMS NEG
no weakness/no fever/no bruising/no abrasion/no tingling/no deformity/no numbness/no stiffness/no back pain
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
## 2017-11-28 NOTE — ED PROVIDER NOTE - PHYSICAL EXAMINATION
L first toe no paronychia present, no discharge, no laceration, no erythema, no swelling. Mild TTP at the medial aspect of the nail overlying where the patient states the infection was. FROM, NVI

## 2017-11-28 NOTE — ED PROVIDER NOTE - PLAN OF CARE
Rest, drink plenty of fluids  Advance activity as tolerated  Apply heat to the leg 15 min on/15 min off  Take Motrin 600mg every 6-8 hours with food as needed  Continue all previously prescribed medications as directed  Follow up with your PMD 2-3 days- bring copies of your results  Return to the ER for worsening

## 2017-12-18 ENCOUNTER — EMERGENCY (EMERGENCY)
Facility: HOSPITAL | Age: 32
LOS: 1 days | Discharge: HOME | End: 2017-12-18
Attending: EMERGENCY MEDICINE | Admitting: EMERGENCY MEDICINE
Payer: COMMERCIAL

## 2017-12-18 VITALS
HEART RATE: 100 BPM | TEMPERATURE: 98 F | SYSTOLIC BLOOD PRESSURE: 140 MMHG | OXYGEN SATURATION: 100 % | RESPIRATION RATE: 14 BRPM | DIASTOLIC BLOOD PRESSURE: 86 MMHG

## 2017-12-18 DIAGNOSIS — Z98.891 HISTORY OF UTERINE SCAR FROM PREVIOUS SURGERY: Chronic | ICD-10-CM

## 2017-12-18 DIAGNOSIS — Z98.89 OTHER SPECIFIED POSTPROCEDURAL STATES: Chronic | ICD-10-CM

## 2017-12-18 PROCEDURE — 99285 EMERGENCY DEPT VISIT HI MDM: CPT

## 2017-12-18 PROCEDURE — 70450 CT HEAD/BRAIN W/O DYE: CPT | Mod: 26

## 2017-12-18 PROCEDURE — 71101 X-RAY EXAM UNILAT RIBS/CHEST: CPT | Mod: 26,LT

## 2017-12-18 PROCEDURE — 70486 CT MAXILLOFACIAL W/O DYE: CPT | Mod: 26

## 2017-12-18 PROCEDURE — 72125 CT NECK SPINE W/O DYE: CPT | Mod: 26

## 2017-12-18 RX ORDER — OXYCODONE AND ACETAMINOPHEN 5; 325 MG/1; MG/1
1 TABLET ORAL ONCE
Qty: 0 | Refills: 0 | Status: DISCONTINUED | OUTPATIENT
Start: 2017-12-18 | End: 2017-12-18

## 2017-12-18 RX ADMIN — OXYCODONE AND ACETAMINOPHEN 1 TABLET(S): 5; 325 TABLET ORAL at 16:23
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
## 2017-12-18 NOTE — ED PROVIDER NOTE - PROGRESS NOTE DETAILS
singh greer: CT and xrays within normal limits. Pt feels better and wants to go, feels safe to go home, still declining social work. pt ready for dc, return precautions given.

## 2017-12-18 NOTE — ED PROVIDER NOTE - PLAN OF CARE
Rest, Advance activity as tolerated.  Continue all previously prescribed medications as directed.  Follow up with your primary care physician in 48-72 hours- bring copies of your results.  Return to the Emergency Department for worsening or persistent symptoms OR ANY NEW OR CONCERNING SYMPTOMS.

## 2018-02-11 ENCOUNTER — EMERGENCY (EMERGENCY)
Facility: HOSPITAL | Age: 33
LOS: 1 days | Discharge: HOME | End: 2018-02-11
Attending: EMERGENCY MEDICINE | Admitting: EMERGENCY MEDICINE
Payer: COMMERCIAL

## 2018-02-11 VITALS
HEIGHT: 63 IN | SYSTOLIC BLOOD PRESSURE: 129 MMHG | OXYGEN SATURATION: 100 % | RESPIRATION RATE: 16 BRPM | WEIGHT: 141.98 LBS | HEART RATE: 113 BPM | TEMPERATURE: 101 F | DIASTOLIC BLOOD PRESSURE: 83 MMHG

## 2018-02-11 DIAGNOSIS — Z98.891 HISTORY OF UTERINE SCAR FROM PREVIOUS SURGERY: Chronic | ICD-10-CM

## 2018-02-11 DIAGNOSIS — Z98.89 OTHER SPECIFIED POSTPROCEDURAL STATES: Chronic | ICD-10-CM

## 2018-02-11 PROCEDURE — 99284 EMERGENCY DEPT VISIT MOD MDM: CPT | Mod: 25

## 2018-02-11 NOTE — ED ADULT TRIAGE NOTE - CHIEF COMPLAINT QUOTE
pt comes with complaints of fever 101.2 and throat pain, recently treated for strep finished ABX on the third , last night began gradual fevers and throat pain which is getting progressively worse, denies PMH, daily medication use

## 2018-02-12 VITALS
RESPIRATION RATE: 16 BRPM | DIASTOLIC BLOOD PRESSURE: 65 MMHG | OXYGEN SATURATION: 99 % | TEMPERATURE: 99 F | SYSTOLIC BLOOD PRESSURE: 115 MMHG | HEART RATE: 89 BPM

## 2018-02-12 RX ORDER — SODIUM CHLORIDE 9 MG/ML
1000 INJECTION INTRAMUSCULAR; INTRAVENOUS; SUBCUTANEOUS ONCE
Qty: 0 | Refills: 0 | Status: COMPLETED | OUTPATIENT
Start: 2018-02-12 | End: 2018-02-12

## 2018-02-12 RX ORDER — KETOROLAC TROMETHAMINE 30 MG/ML
15 SYRINGE (ML) INJECTION ONCE
Qty: 0 | Refills: 0 | Status: DISCONTINUED | OUTPATIENT
Start: 2018-02-12 | End: 2018-02-12

## 2018-02-12 RX ORDER — ONDANSETRON 8 MG/1
1 TABLET, FILM COATED ORAL
Qty: 15 | Refills: 0
Start: 2018-02-12

## 2018-02-12 RX ORDER — SODIUM CHLORIDE 9 MG/ML
3 INJECTION INTRAMUSCULAR; INTRAVENOUS; SUBCUTANEOUS EVERY 8 HOURS
Qty: 0 | Refills: 0 | Status: DISCONTINUED | OUTPATIENT
Start: 2018-02-12 | End: 2018-02-15

## 2018-02-12 RX ORDER — ACETAMINOPHEN 500 MG
650 TABLET ORAL ONCE
Qty: 0 | Refills: 0 | Status: COMPLETED | OUTPATIENT
Start: 2018-02-12 | End: 2018-02-12

## 2018-02-12 RX ADMIN — SODIUM CHLORIDE 1000 MILLILITER(S): 9 INJECTION INTRAMUSCULAR; INTRAVENOUS; SUBCUTANEOUS at 02:52

## 2018-02-12 RX ADMIN — Medication 650 MILLIGRAM(S): at 02:53

## 2018-02-12 RX ADMIN — Medication 15 MILLIGRAM(S): at 02:53

## 2018-02-12 NOTE — ED PROVIDER NOTE - CARE PLAN
Principal Discharge DX:	Influenza-like illness Principal Discharge DX:	Influenza-like illness  Assessment and plan of treatment:	1. TAKE ALL MEDICATIONS AS DIRECTED.    2. FOR PAIN OR FEVER YOU CAN TAKE IBUPROFEN (MOTRIN, ADVIL) OR ACETAMINOPHEN (TYLENOL) AS NEEDED, AS DIRECTED ON PACKAGING.  3. FOLLOW UP WITH YOUR PRIMARY DOCTOR WITHIN 5 DAYS AS DIRECTED.  4. IF YOU HAD LABS OR IMAGING DONE, YOU WERE GIVEN COPIES OF ALL LABS AND/OR IMAGING RESULTS FROM YOUR ER VISIT--PLEASE TAKE THEM WITH YOU TO YOUR FOLLOW UP APPOINTMENTS.  5. IF NEEDED, CALL PATIENT ACCESS SERVICES AT [phone] TO FIND A PRIMARY CARE PHYSICIAN.  OR CALL [phone] TO MAKE AN APPOINTMENT WITH THE CLINIC.  6. RETURN TO THE ER FOR ANY WORSENING SYMPTOMS OR CONCERNS.

## 2018-02-12 NOTE — ED PROVIDER NOTE - PLAN OF CARE
1. TAKE ALL MEDICATIONS AS DIRECTED.    2. FOR PAIN OR FEVER YOU CAN TAKE IBUPROFEN (MOTRIN, ADVIL) OR ACETAMINOPHEN (TYLENOL) AS NEEDED, AS DIRECTED ON PACKAGING.  3. FOLLOW UP WITH YOUR PRIMARY DOCTOR WITHIN 5 DAYS AS DIRECTED.  4. IF YOU HAD LABS OR IMAGING DONE, YOU WERE GIVEN COPIES OF ALL LABS AND/OR IMAGING RESULTS FROM YOUR ER VISIT--PLEASE TAKE THEM WITH YOU TO YOUR FOLLOW UP APPOINTMENTS.  5. IF NEEDED, CALL PATIENT ACCESS SERVICES AT [phone] TO FIND A PRIMARY CARE PHYSICIAN.  OR CALL [phone] TO MAKE AN APPOINTMENT WITH THE CLINIC.  6. RETURN TO THE ER FOR ANY WORSENING SYMPTOMS OR CONCERNS.

## 2018-02-12 NOTE — ED ADULT NURSE NOTE - OBJECTIVE STATEMENT
Pt. was diagnosed with strep throat last week, took antibiotic. Then developed generalized body aches on Saturday.

## 2018-02-12 NOTE — ED PROVIDER NOTE - OBJECTIVE STATEMENT
31 yo started sat night with generalized body aches and sore throat.  Went to bed and felt ok this morning then got significantly worse today.  No known sick contacts.  A few weeks ago completed a z-pack for strep throat.  Subjective fevers at home.  Sick contacts at work

## 2018-02-12 NOTE — ED PROVIDER NOTE - PROGRESS NOTE DETAILS
pt feeling significantly better at this time tolerating PO and ready to go home.  Does not wish to take tamiflu

## 2018-02-12 NOTE — ED PROVIDER NOTE - MEDICAL DECISION MAKING DETAILS
pt presenting with flu like symptoms.  Will treat symptomatically.  In the window for Tamiflu so will offer the patient treatment.

## 2018-02-14 ENCOUNTER — EMERGENCY (EMERGENCY)
Facility: HOSPITAL | Age: 33
LOS: 1 days | Discharge: HOME | End: 2018-02-14
Attending: EMERGENCY MEDICINE | Admitting: EMERGENCY MEDICINE
Payer: COMMERCIAL

## 2018-02-14 VITALS
SYSTOLIC BLOOD PRESSURE: 133 MMHG | TEMPERATURE: 99 F | DIASTOLIC BLOOD PRESSURE: 85 MMHG | HEART RATE: 106 BPM | OXYGEN SATURATION: 100 % | RESPIRATION RATE: 18 BRPM

## 2018-02-14 DIAGNOSIS — Z98.89 OTHER SPECIFIED POSTPROCEDURAL STATES: Chronic | ICD-10-CM

## 2018-02-14 DIAGNOSIS — Z98.891 HISTORY OF UTERINE SCAR FROM PREVIOUS SURGERY: Chronic | ICD-10-CM

## 2018-02-14 LAB
ALBUMIN SERPL ELPH-MCNC: 4.5 G/DL — SIGNIFICANT CHANGE UP (ref 3.3–5)
ALP SERPL-CCNC: 45 U/L — SIGNIFICANT CHANGE UP (ref 40–120)
ALT FLD-CCNC: 16 U/L — SIGNIFICANT CHANGE UP (ref 4–33)
APPEARANCE UR: SIGNIFICANT CHANGE UP
AST SERPL-CCNC: 23 U/L — SIGNIFICANT CHANGE UP (ref 4–32)
BASOPHILS # BLD AUTO: 0.02 K/UL — SIGNIFICANT CHANGE UP (ref 0–0.2)
BASOPHILS NFR BLD AUTO: 0.5 % — SIGNIFICANT CHANGE UP (ref 0–2)
BILIRUB SERPL-MCNC: 0.6 MG/DL — SIGNIFICANT CHANGE UP (ref 0.2–1.2)
BILIRUB UR-MCNC: NEGATIVE — SIGNIFICANT CHANGE UP
BLOOD UR QL VISUAL: NEGATIVE — SIGNIFICANT CHANGE UP
BUN SERPL-MCNC: 10 MG/DL — SIGNIFICANT CHANGE UP (ref 7–23)
CALCIUM SERPL-MCNC: 8.9 MG/DL — SIGNIFICANT CHANGE UP (ref 8.4–10.5)
CHLORIDE SERPL-SCNC: 103 MMOL/L — SIGNIFICANT CHANGE UP (ref 98–107)
CO2 SERPL-SCNC: 25 MMOL/L — SIGNIFICANT CHANGE UP (ref 22–31)
COLOR SPEC: YELLOW — SIGNIFICANT CHANGE UP
CREAT SERPL-MCNC: 0.89 MG/DL — SIGNIFICANT CHANGE UP (ref 0.5–1.3)
EOSINOPHIL # BLD AUTO: 0.04 K/UL — SIGNIFICANT CHANGE UP (ref 0–0.5)
EOSINOPHIL NFR BLD AUTO: 1 % — SIGNIFICANT CHANGE UP (ref 0–6)
GLUCOSE SERPL-MCNC: 89 MG/DL — SIGNIFICANT CHANGE UP (ref 70–99)
GLUCOSE UR-MCNC: NEGATIVE — SIGNIFICANT CHANGE UP
HCT VFR BLD CALC: 41.2 % — SIGNIFICANT CHANGE UP (ref 34.5–45)
HGB BLD-MCNC: 13.8 G/DL — SIGNIFICANT CHANGE UP (ref 11.5–15.5)
IMM GRANULOCYTES # BLD AUTO: 0.01 # — SIGNIFICANT CHANGE UP
IMM GRANULOCYTES NFR BLD AUTO: 0.2 % — SIGNIFICANT CHANGE UP (ref 0–1.5)
KETONES UR-MCNC: SIGNIFICANT CHANGE UP
LEUKOCYTE ESTERASE UR-ACNC: NEGATIVE — SIGNIFICANT CHANGE UP
LYMPHOCYTES # BLD AUTO: 1.41 K/UL — SIGNIFICANT CHANGE UP (ref 1–3.3)
LYMPHOCYTES # BLD AUTO: 34.3 % — SIGNIFICANT CHANGE UP (ref 13–44)
MCHC RBC-ENTMCNC: 29.9 PG — SIGNIFICANT CHANGE UP (ref 27–34)
MCHC RBC-ENTMCNC: 33.5 % — SIGNIFICANT CHANGE UP (ref 32–36)
MCV RBC AUTO: 89.2 FL — SIGNIFICANT CHANGE UP (ref 80–100)
MONOCYTES # BLD AUTO: 0.48 K/UL — SIGNIFICANT CHANGE UP (ref 0–0.9)
MONOCYTES NFR BLD AUTO: 11.7 % — SIGNIFICANT CHANGE UP (ref 2–14)
MUCOUS THREADS # UR AUTO: SIGNIFICANT CHANGE UP
NEUTROPHILS # BLD AUTO: 2.15 K/UL — SIGNIFICANT CHANGE UP (ref 1.8–7.4)
NEUTROPHILS NFR BLD AUTO: 52.3 % — SIGNIFICANT CHANGE UP (ref 43–77)
NITRITE UR-MCNC: NEGATIVE — SIGNIFICANT CHANGE UP
NRBC # FLD: 0 — SIGNIFICANT CHANGE UP
PH UR: 6.5 — SIGNIFICANT CHANGE UP (ref 4.6–8)
PLATELET # BLD AUTO: 146 K/UL — LOW (ref 150–400)
PMV BLD: 10.8 FL — SIGNIFICANT CHANGE UP (ref 7–13)
POTASSIUM SERPL-MCNC: 3.9 MMOL/L — SIGNIFICANT CHANGE UP (ref 3.5–5.3)
POTASSIUM SERPL-SCNC: 3.9 MMOL/L — SIGNIFICANT CHANGE UP (ref 3.5–5.3)
PROT SERPL-MCNC: 7.3 G/DL — SIGNIFICANT CHANGE UP (ref 6–8.3)
PROT UR-MCNC: 30 MG/DL — HIGH
RBC # BLD: 4.62 M/UL — SIGNIFICANT CHANGE UP (ref 3.8–5.2)
RBC # FLD: 11.6 % — SIGNIFICANT CHANGE UP (ref 10.3–14.5)
RBC CASTS # UR COMP ASSIST: SIGNIFICANT CHANGE UP (ref 0–?)
SODIUM SERPL-SCNC: 142 MMOL/L — SIGNIFICANT CHANGE UP (ref 135–145)
SP GR SPEC: 1.04 — SIGNIFICANT CHANGE UP (ref 1–1.04)
SQUAMOUS # UR AUTO: SIGNIFICANT CHANGE UP
UROBILINOGEN FLD QL: 1 MG/DL — SIGNIFICANT CHANGE UP
WBC # BLD: 4.11 K/UL — SIGNIFICANT CHANGE UP (ref 3.8–10.5)
WBC # FLD AUTO: 4.11 K/UL — SIGNIFICANT CHANGE UP (ref 3.8–10.5)
WBC UR QL: SIGNIFICANT CHANGE UP (ref 0–?)

## 2018-02-14 PROCEDURE — 99284 EMERGENCY DEPT VISIT MOD MDM: CPT

## 2018-02-14 RX ORDER — KETOROLAC TROMETHAMINE 30 MG/ML
30 SYRINGE (ML) INJECTION ONCE
Qty: 0 | Refills: 0 | Status: DISCONTINUED | OUTPATIENT
Start: 2018-02-14 | End: 2018-02-14

## 2018-02-14 RX ORDER — ONDANSETRON 8 MG/1
4 TABLET, FILM COATED ORAL ONCE
Qty: 0 | Refills: 0 | Status: COMPLETED | OUTPATIENT
Start: 2018-02-14 | End: 2018-02-14

## 2018-02-14 RX ORDER — FAMOTIDINE 10 MG/ML
20 INJECTION INTRAVENOUS ONCE
Qty: 0 | Refills: 0 | Status: COMPLETED | OUTPATIENT
Start: 2018-02-14 | End: 2018-02-14

## 2018-02-14 RX ORDER — SODIUM CHLORIDE 9 MG/ML
1000 INJECTION INTRAMUSCULAR; INTRAVENOUS; SUBCUTANEOUS ONCE
Qty: 0 | Refills: 0 | Status: COMPLETED | OUTPATIENT
Start: 2018-02-14 | End: 2018-02-14

## 2018-02-14 RX ORDER — ACETAMINOPHEN 500 MG
1000 TABLET ORAL ONCE
Qty: 0 | Refills: 0 | Status: COMPLETED | OUTPATIENT
Start: 2018-02-14 | End: 2018-02-14

## 2018-02-14 RX ADMIN — SODIUM CHLORIDE 1000 MILLILITER(S): 9 INJECTION INTRAMUSCULAR; INTRAVENOUS; SUBCUTANEOUS at 23:08

## 2018-02-14 RX ADMIN — SODIUM CHLORIDE 1000 MILLILITER(S): 9 INJECTION INTRAMUSCULAR; INTRAVENOUS; SUBCUTANEOUS at 21:14

## 2018-02-14 RX ADMIN — Medication 30 MILLIGRAM(S): at 22:07

## 2018-02-14 RX ADMIN — Medication 400 MILLIGRAM(S): at 21:14

## 2018-02-14 RX ADMIN — Medication 1000 MILLIGRAM(S): at 22:07

## 2018-02-14 RX ADMIN — Medication 30 MILLIGRAM(S): at 23:08

## 2018-02-14 RX ADMIN — ONDANSETRON 4 MILLIGRAM(S): 8 TABLET, FILM COATED ORAL at 23:08

## 2018-02-14 NOTE — ED PROVIDER NOTE - OBJECTIVE STATEMENT
31 y/o female no pmh c/o worsening flu like symptoms x today. Pt was seen in the ER x2 days ago and dx with the flu. Pt admit sto taking motrin and robitussin with good relief. Pt sates that she woke up today with worsening fever, cough, b/l ear pain, chest pain with coughing and abd pain. Pt admits to nausea. states abd pain, is sharp and intermittent, denies aggravating or relieving factors. Pt took motrin today with little relief. Pt denies sob, v/d, dysuria, vaginal bleeding, numbness, tingling, weakness, dizziness, or chills.

## 2018-02-14 NOTE — ED ADULT TRIAGE NOTE - CHIEF COMPLAINT QUOTE
Pt states she was here 2d ago, diagnosed with the flu, c/o worsening symptoms - abd pain, chest pain, cough, weakness, ear pain.

## 2018-02-15 VITALS — HEART RATE: 88 BPM | SYSTOLIC BLOOD PRESSURE: 183 MMHG | RESPIRATION RATE: 18 BRPM | DIASTOLIC BLOOD PRESSURE: 98 MMHG

## 2018-02-15 RX ADMIN — Medication 30 MILLILITER(S): at 00:16

## 2018-02-15 RX ADMIN — FAMOTIDINE 20 MILLIGRAM(S): 10 INJECTION INTRAVENOUS at 00:16

## 2018-04-19 ENCOUNTER — EMERGENCY (EMERGENCY)
Facility: HOSPITAL | Age: 33
LOS: 1 days | Discharge: HOME | End: 2018-04-19
Attending: EMERGENCY MEDICINE | Admitting: EMERGENCY MEDICINE
Payer: COMMERCIAL

## 2018-04-19 VITALS
HEART RATE: 79 BPM | RESPIRATION RATE: 18 BRPM | TEMPERATURE: 98 F | OXYGEN SATURATION: 100 % | DIASTOLIC BLOOD PRESSURE: 77 MMHG | SYSTOLIC BLOOD PRESSURE: 98 MMHG

## 2018-04-19 DIAGNOSIS — N83.20 UNSPECIFIED OVARIAN CYSTS: ICD-10-CM

## 2018-04-19 DIAGNOSIS — Z98.891 HISTORY OF UTERINE SCAR FROM PREVIOUS SURGERY: Chronic | ICD-10-CM

## 2018-04-19 DIAGNOSIS — Z98.89 OTHER SPECIFIED POSTPROCEDURAL STATES: Chronic | ICD-10-CM

## 2018-04-19 LAB
ALBUMIN SERPL ELPH-MCNC: 4.4 G/DL — SIGNIFICANT CHANGE UP (ref 3.3–5)
ALP SERPL-CCNC: 45 U/L — SIGNIFICANT CHANGE UP (ref 40–120)
ALT FLD-CCNC: 11 U/L — SIGNIFICANT CHANGE UP (ref 4–33)
APPEARANCE UR: CLEAR — SIGNIFICANT CHANGE UP
AST SERPL-CCNC: 14 U/L — SIGNIFICANT CHANGE UP (ref 4–32)
BASE EXCESS BLDV CALC-SCNC: 4.1 MMOL/L — SIGNIFICANT CHANGE UP
BASOPHILS # BLD AUTO: 0.03 K/UL — SIGNIFICANT CHANGE UP (ref 0–0.2)
BASOPHILS NFR BLD AUTO: 0.3 % — SIGNIFICANT CHANGE UP (ref 0–2)
BILIRUB SERPL-MCNC: 0.6 MG/DL — SIGNIFICANT CHANGE UP (ref 0.2–1.2)
BILIRUB UR-MCNC: NEGATIVE — SIGNIFICANT CHANGE UP
BLD GP AB SCN SERPL QL: NEGATIVE — SIGNIFICANT CHANGE UP
BLOOD GAS VENOUS - CREATININE: 0.93 MG/DL — SIGNIFICANT CHANGE UP (ref 0.5–1.3)
BLOOD UR QL VISUAL: NEGATIVE — SIGNIFICANT CHANGE UP
BUN SERPL-MCNC: 16 MG/DL — SIGNIFICANT CHANGE UP (ref 7–23)
CALCIUM SERPL-MCNC: 9.1 MG/DL — SIGNIFICANT CHANGE UP (ref 8.4–10.5)
CHLORIDE BLDV-SCNC: 108 MMOL/L — SIGNIFICANT CHANGE UP (ref 96–108)
CHLORIDE SERPL-SCNC: 102 MMOL/L — SIGNIFICANT CHANGE UP (ref 98–107)
CO2 SERPL-SCNC: 24 MMOL/L — SIGNIFICANT CHANGE UP (ref 22–31)
COLOR SPEC: YELLOW — SIGNIFICANT CHANGE UP
CREAT SERPL-MCNC: 0.96 MG/DL — SIGNIFICANT CHANGE UP (ref 0.5–1.3)
EOSINOPHIL # BLD AUTO: 0.09 K/UL — SIGNIFICANT CHANGE UP (ref 0–0.5)
EOSINOPHIL NFR BLD AUTO: 0.8 % — SIGNIFICANT CHANGE UP (ref 0–6)
GAS PNL BLDV: 135 MMOL/L — LOW (ref 136–146)
GLUCOSE BLDV-MCNC: 86 — SIGNIFICANT CHANGE UP (ref 70–99)
GLUCOSE SERPL-MCNC: 81 MG/DL — SIGNIFICANT CHANGE UP (ref 70–99)
GLUCOSE UR-MCNC: NEGATIVE — SIGNIFICANT CHANGE UP
HCG SERPL-ACNC: < 5 MIU/ML — SIGNIFICANT CHANGE UP
HCO3 BLDV-SCNC: 26 MMOL/L — SIGNIFICANT CHANGE UP (ref 20–27)
HCT VFR BLD CALC: 39.1 % — SIGNIFICANT CHANGE UP (ref 34.5–45)
HCT VFR BLDV CALC: 41.1 % — SIGNIFICANT CHANGE UP (ref 34.5–45)
HGB BLD-MCNC: 12.9 G/DL — SIGNIFICANT CHANGE UP (ref 11.5–15.5)
HGB BLDV-MCNC: 13.4 G/DL — SIGNIFICANT CHANGE UP (ref 11.5–15.5)
HYALINE CASTS # UR AUTO: SIGNIFICANT CHANGE UP (ref 0–?)
IMM GRANULOCYTES # BLD AUTO: 0.04 # — SIGNIFICANT CHANGE UP
IMM GRANULOCYTES NFR BLD AUTO: 0.4 % — SIGNIFICANT CHANGE UP (ref 0–1.5)
KETONES UR-MCNC: NEGATIVE — SIGNIFICANT CHANGE UP
LACTATE BLDV-MCNC: 1 MMOL/L — SIGNIFICANT CHANGE UP (ref 0.5–2)
LEUKOCYTE ESTERASE UR-ACNC: NEGATIVE — SIGNIFICANT CHANGE UP
LYMPHOCYTES # BLD AUTO: 2.61 K/UL — SIGNIFICANT CHANGE UP (ref 1–3.3)
LYMPHOCYTES # BLD AUTO: 24.1 % — SIGNIFICANT CHANGE UP (ref 13–44)
MCHC RBC-ENTMCNC: 30.3 PG — SIGNIFICANT CHANGE UP (ref 27–34)
MCHC RBC-ENTMCNC: 33 % — SIGNIFICANT CHANGE UP (ref 32–36)
MCV RBC AUTO: 91.8 FL — SIGNIFICANT CHANGE UP (ref 80–100)
MONOCYTES # BLD AUTO: 0.69 K/UL — SIGNIFICANT CHANGE UP (ref 0–0.9)
MONOCYTES NFR BLD AUTO: 6.4 % — SIGNIFICANT CHANGE UP (ref 2–14)
MUCOUS THREADS # UR AUTO: SIGNIFICANT CHANGE UP
NEUTROPHILS # BLD AUTO: 7.39 K/UL — SIGNIFICANT CHANGE UP (ref 1.8–7.4)
NEUTROPHILS NFR BLD AUTO: 68 % — SIGNIFICANT CHANGE UP (ref 43–77)
NITRITE UR-MCNC: NEGATIVE — SIGNIFICANT CHANGE UP
NRBC # FLD: 0 — SIGNIFICANT CHANGE UP
PCO2 BLDV: 51 MMHG — SIGNIFICANT CHANGE UP (ref 41–51)
PH BLDV: 7.37 PH — SIGNIFICANT CHANGE UP (ref 7.32–7.43)
PH UR: 6.5 — SIGNIFICANT CHANGE UP (ref 4.6–8)
PLATELET # BLD AUTO: 177 K/UL — SIGNIFICANT CHANGE UP (ref 150–400)
PMV BLD: 11.4 FL — SIGNIFICANT CHANGE UP (ref 7–13)
PO2 BLDV: 27 MMHG — LOW (ref 35–40)
POTASSIUM BLDV-SCNC: 3.5 MMOL/L — SIGNIFICANT CHANGE UP (ref 3.4–4.5)
POTASSIUM SERPL-MCNC: 4.1 MMOL/L — SIGNIFICANT CHANGE UP (ref 3.5–5.3)
POTASSIUM SERPL-SCNC: 4.1 MMOL/L — SIGNIFICANT CHANGE UP (ref 3.5–5.3)
PROT SERPL-MCNC: 6.8 G/DL — SIGNIFICANT CHANGE UP (ref 6–8.3)
PROT UR-MCNC: 10 MG/DL — SIGNIFICANT CHANGE UP
RBC # BLD: 4.26 M/UL — SIGNIFICANT CHANGE UP (ref 3.8–5.2)
RBC # FLD: 11.7 % — SIGNIFICANT CHANGE UP (ref 10.3–14.5)
RBC CASTS # UR COMP ASSIST: SIGNIFICANT CHANGE UP (ref 0–?)
RH IG SCN BLD-IMP: POSITIVE — SIGNIFICANT CHANGE UP
SAO2 % BLDV: 42.8 % — LOW (ref 60–85)
SODIUM SERPL-SCNC: 141 MMOL/L — SIGNIFICANT CHANGE UP (ref 135–145)
SP GR SPEC: 1.03 — SIGNIFICANT CHANGE UP (ref 1–1.04)
SQUAMOUS # UR AUTO: SIGNIFICANT CHANGE UP
UROBILINOGEN FLD QL: 1 MG/DL — SIGNIFICANT CHANGE UP
WBC # BLD: 10.85 K/UL — HIGH (ref 3.8–10.5)
WBC # FLD AUTO: 10.85 K/UL — HIGH (ref 3.8–10.5)
WBC UR QL: SIGNIFICANT CHANGE UP (ref 0–?)

## 2018-04-19 PROCEDURE — 99285 EMERGENCY DEPT VISIT HI MDM: CPT

## 2018-04-19 PROCEDURE — 93975 VASCULAR STUDY: CPT | Mod: 26

## 2018-04-19 PROCEDURE — 76830 TRANSVAGINAL US NON-OB: CPT | Mod: 26

## 2018-04-19 RX ORDER — KETOROLAC TROMETHAMINE 30 MG/ML
15 SYRINGE (ML) INJECTION ONCE
Qty: 0 | Refills: 0 | Status: DISCONTINUED | OUTPATIENT
Start: 2018-04-19 | End: 2018-04-19

## 2018-04-19 RX ORDER — SODIUM CHLORIDE 9 MG/ML
1000 INJECTION INTRAMUSCULAR; INTRAVENOUS; SUBCUTANEOUS ONCE
Qty: 0 | Refills: 0 | Status: COMPLETED | OUTPATIENT
Start: 2018-04-19 | End: 2018-04-19

## 2018-04-19 RX ORDER — ACETAMINOPHEN 500 MG
1000 TABLET ORAL ONCE
Qty: 0 | Refills: 0 | Status: COMPLETED | OUTPATIENT
Start: 2018-04-19 | End: 2018-04-19

## 2018-04-19 RX ADMIN — Medication 400 MILLIGRAM(S): at 18:43

## 2018-04-19 RX ADMIN — SODIUM CHLORIDE 1000 MILLILITER(S): 9 INJECTION INTRAMUSCULAR; INTRAVENOUS; SUBCUTANEOUS at 18:43

## 2018-04-19 RX ADMIN — Medication 1000 MILLIGRAM(S): at 21:00

## 2018-04-19 RX ADMIN — Medication 15 MILLIGRAM(S): at 21:40
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
## 2018-04-19 NOTE — ED ADULT NURSE NOTE - OBJECTIVE STATEMENT
Patient received in intake room 7, patient is a 31 y/o female a&ox4 p/w a c/c of generalized abd pain and nausea since yesterday evening.  Patient has a PMH of a ruptured left ovarian cyst.  Denies fevers/chills, dysuria, vaginal bleeding/discharge.  VSS, patient in nad, 20 gauge PIV placed in right ac.

## 2018-04-19 NOTE — ED PROVIDER NOTE - OBJECTIVE STATEMENT
32y f w PMHx ruptured L ovarian cyst p/w L adnexal pain and nausea since yesterday evening. Pt is not currently on her period, is due for it in abt 12 days. States her periods are usually regular and are sometimes associated with L adnexal pain but never this severe. Denies f/c, denies vaginal bleeding or discharge, denies upper abdominal pain.

## 2018-04-19 NOTE — CONSULT NOTE ADULT - PROBLEM SELECTOR RECOMMENDATION 9
-likely not torsion given benign abdominal exam and small size of cyst, TVUS wnl  -VSS, labs wnl  -will f/u GCCT sent in office  -UA neg, likely not UTI  -recommend f/u in office next week   -recommend return to ED if pain worsens, with nausea/emesis, SOB, light-headedness, palpitations    Queenie, PGY-2  d/w and seen w/ Dr. Gaines

## 2018-04-19 NOTE — ED PROVIDER NOTE - PROGRESS NOTE DETAILS
Lorie: Multiple calls to ultrasound tech to expedite US for concern for ovarian torsion, initially refused to take patient without a UCG, now prioritizing other patients before. I expressed concern for ovarian torsion. escalated to charge nurse and radiology supervisor. pt now to go to US shortly. Lorie: Multiple calls to ultrasound tech to expedite US for concern for ovarian torsion, initially refused to take patient without a UCG, now prioritizing other patients before. I expressed concern for ovarian torsion. escalated to charge nurse and radiology supervisor. pt now to go to US shortly. Pt seen and examined by me with continued left adnexal TTP. will continue to reassess. Lorie: Pt transported to US, GYN consulted for concern for torsion Lorie: Pt seen and evaluated by GYN, exam concerning will likely keep pt for observation overnight. GYn attending will come evaluate patient for final decision. Lorie: Pt evaluated by GYN attending, does not think that admission is warranted at this time. will reevaluate patient and dispo as appropriate. Lorie: Pt still has significant left adnexal TTP, does not feel any better, and is not comfortable going home. will place patient in CDU for serial abdominal exams, and reassessment by GYn. Lorie: Pt signed out to DR Carlos pending CT and reevaluation. Terrance FELICIANO: pt signed out to me, she is sleeping comfortably, feeling better upon reexam.  Abd is soft with mild left sided tenderness, but no rebound or guarding.  CT redemonstrates 1.7cm left ovarian follicle vs cyst.  Pt seen and evaluated by GYN, they do not believe this is torsion and US shows normal flow to bilat ovaries.  Pt states she has gyn follow-up and can see gyn tomorrow, would like to try oral medications as she is feeling better. Terrance FELICIANO: Pt feeling better after po ibuprofen.  Rx sent to pharmacy, pt has f/u appt with gyn later today (friday).  Return precautions given to patient with understanding.  Stable for discharge home.

## 2018-04-19 NOTE — ED ADULT TRIAGE NOTE - CHIEF COMPLAINT QUOTE
Pt c/o abdominal pain radiating to left lower quadrant x2 days. c/o nausea. Denies vaginal bleeding. Hx of ruptured cyst.

## 2018-04-19 NOTE — CONSULT NOTE ADULT - ATTENDING COMMENTS
covering attending noted.  Patient was seen and evaluated in ER. She presented c/o of lower abdominal pain, more on left,  some nausea since yesterday. No fever, chills  was treated for yeast infection 4 days ago in office by dr. Corrales and was given rx for OCPs. States was told that vaginal cx done in office were negative.  Was waiting for period to start pill.   she took Ibuprofen last night and fel asleep. She woke up with some pressure. pain resumed when at work. Presented to ER.   labs and US reviewed. WBC normal: upper range, urine dip neg for infection. TV us: 2.5 cm left ovarian cyst simple, no signs of torsion of free fluid.  General: NAD, feels some pain in pelvis when legs are strait. s/p Tylenol.  Abdomen: soft, mild to moderate tenderness suprapubically, no rebound or guarding.  Vaginal exam: see note  Lymph nodes: not enlarged, not tender in the groin bilaterally  skin: no rashes, intact, warm  Psych: normal affect and thought content  Assessment: patient with pelvic pain, unlikely related to torsion, possibly to cyst. which is simple and small on imaging, no acute abdomen on exam,  Plan: discharge patient home to follow up with gyn, may use Tylenol/Motrin for pain, and off work tomorrow.   patient was advised to seek help with severe pain develops or sings/symptoms of infection.  she verbalized understanding and agrees with the plan.  I read and agree with above resident's assessment.

## 2018-04-19 NOTE — ED PROVIDER NOTE - ATTENDING CONTRIBUTION TO CARE
Case of a 31 y/o female patient with history of left sided ovarian cyst presenting to the ED with left sided pain. Upon physical exam patient with moderate tenderness on LLQ and severe tenderness on left adnexal area. R/O ovarian torsion vs ruptured cyst, will do labs, TVUS and reassess.

## 2018-04-19 NOTE — ED PROVIDER NOTE - MEDICAL DECISION MAKING DETAILS
32y f w PMHx ruptured L ovarian cyst p/w L adnexal pain and nausea since yesterday evening. Pt is extremely tender in L adnexa, no vaginal bleeding or discharge on exam, no cervical motion tenderness. Will obtain TVUS to r/o ovarian cyst vs torsion vs ectopic preg, obtain labs + type/screen, admin sx control -[facility]

## 2018-04-20 VITALS
OXYGEN SATURATION: 100 % | DIASTOLIC BLOOD PRESSURE: 66 MMHG | TEMPERATURE: 98 F | RESPIRATION RATE: 18 BRPM | SYSTOLIC BLOOD PRESSURE: 103 MMHG | HEART RATE: 73 BPM

## 2018-04-20 PROCEDURE — 74177 CT ABD & PELVIS W/CONTRAST: CPT | Mod: 26

## 2018-04-20 RX ORDER — OXYCODONE AND ACETAMINOPHEN 5; 325 MG/1; MG/1
1 TABLET ORAL
Qty: 9 | Refills: 0
Start: 2018-04-20 | End: 2018-04-22

## 2018-04-20 RX ORDER — IBUPROFEN 200 MG
600 TABLET ORAL ONCE
Qty: 0 | Refills: 0 | Status: COMPLETED | OUTPATIENT
Start: 2018-04-20 | End: 2018-04-20

## 2018-04-20 RX ORDER — IBUPROFEN 200 MG
1 TABLET ORAL
Qty: 20 | Refills: 0
Start: 2018-04-20 | End: 2018-04-24

## 2018-04-20 RX ORDER — MORPHINE SULFATE 50 MG/1
4 CAPSULE, EXTENDED RELEASE ORAL ONCE
Qty: 0 | Refills: 0 | Status: DISCONTINUED | OUTPATIENT
Start: 2018-04-20 | End: 2018-04-20

## 2018-04-20 RX ADMIN — Medication 15 MILLIGRAM(S): at 00:18

## 2018-04-20 RX ADMIN — Medication 600 MILLIGRAM(S): at 04:50

## 2018-04-20 RX ADMIN — MORPHINE SULFATE 4 MILLIGRAM(S): 50 CAPSULE, EXTENDED RELEASE ORAL at 00:35

## 2018-04-21 LAB
BACTERIA UR CULT: SIGNIFICANT CHANGE UP
SPECIMEN SOURCE: SIGNIFICANT CHANGE UP

## 2018-04-22 NOTE — ED POST DISCHARGE NOTE - RESULT SUMMARY
UCX: staphylococcus sp. coag neg >100.000 CFU/ml No antibiotic listed in ED provider note or prescription writer at time of discharge. Patient contact # [phone] Msg left with Admin # and hrs alt # [phone]. UA: negative.

## 2018-08-13 ENCOUNTER — EMERGENCY (EMERGENCY)
Facility: HOSPITAL | Age: 33
LOS: 1 days | Discharge: HOME | End: 2018-08-13
Attending: EMERGENCY MEDICINE | Admitting: EMERGENCY MEDICINE
Payer: COMMERCIAL

## 2018-08-13 VITALS
RESPIRATION RATE: 18 BRPM | DIASTOLIC BLOOD PRESSURE: 90 MMHG | TEMPERATURE: 98 F | HEART RATE: 97 BPM | SYSTOLIC BLOOD PRESSURE: 138 MMHG | OXYGEN SATURATION: 98 %

## 2018-08-13 DIAGNOSIS — Z98.89 OTHER SPECIFIED POSTPROCEDURAL STATES: Chronic | ICD-10-CM

## 2018-08-13 DIAGNOSIS — Z98.891 HISTORY OF UTERINE SCAR FROM PREVIOUS SURGERY: Chronic | ICD-10-CM

## 2018-08-13 LAB
ALBUMIN SERPL ELPH-MCNC: 4.5 G/DL — SIGNIFICANT CHANGE UP (ref 3.3–5)
ALP SERPL-CCNC: 47 U/L — SIGNIFICANT CHANGE UP (ref 40–120)
ALT FLD-CCNC: 14 U/L — SIGNIFICANT CHANGE UP (ref 4–33)
APPEARANCE UR: SIGNIFICANT CHANGE UP
AST SERPL-CCNC: 18 U/L — SIGNIFICANT CHANGE UP (ref 4–32)
BACTERIA # UR AUTO: SIGNIFICANT CHANGE UP
BASOPHILS # BLD AUTO: 0.02 K/UL — SIGNIFICANT CHANGE UP (ref 0–0.2)
BASOPHILS NFR BLD AUTO: 0.2 % — SIGNIFICANT CHANGE UP (ref 0–2)
BILIRUB SERPL-MCNC: 1.1 MG/DL — SIGNIFICANT CHANGE UP (ref 0.2–1.2)
BILIRUB UR-MCNC: NEGATIVE — SIGNIFICANT CHANGE UP
BLOOD UR QL VISUAL: NEGATIVE — SIGNIFICANT CHANGE UP
BUN SERPL-MCNC: 9 MG/DL — SIGNIFICANT CHANGE UP (ref 7–23)
CALCIUM SERPL-MCNC: 9.2 MG/DL — SIGNIFICANT CHANGE UP (ref 8.4–10.5)
CHLORIDE SERPL-SCNC: 103 MMOL/L — SIGNIFICANT CHANGE UP (ref 98–107)
CO2 SERPL-SCNC: 24 MMOL/L — SIGNIFICANT CHANGE UP (ref 22–31)
COLOR SPEC: YELLOW — SIGNIFICANT CHANGE UP
CREAT SERPL-MCNC: 0.73 MG/DL — SIGNIFICANT CHANGE UP (ref 0.5–1.3)
EOSINOPHIL # BLD AUTO: 0.05 K/UL — SIGNIFICANT CHANGE UP (ref 0–0.5)
EOSINOPHIL NFR BLD AUTO: 0.6 % — SIGNIFICANT CHANGE UP (ref 0–6)
GLUCOSE SERPL-MCNC: 97 MG/DL — SIGNIFICANT CHANGE UP (ref 70–99)
GLUCOSE UR-MCNC: NEGATIVE — SIGNIFICANT CHANGE UP
HCT VFR BLD CALC: 37.8 % — SIGNIFICANT CHANGE UP (ref 34.5–45)
HGB BLD-MCNC: 12.9 G/DL — SIGNIFICANT CHANGE UP (ref 11.5–15.5)
IMM GRANULOCYTES # BLD AUTO: 0.03 # — SIGNIFICANT CHANGE UP
IMM GRANULOCYTES NFR BLD AUTO: 0.3 % — SIGNIFICANT CHANGE UP (ref 0–1.5)
KETONES UR-MCNC: SIGNIFICANT CHANGE UP
LEUKOCYTE ESTERASE UR-ACNC: NEGATIVE — SIGNIFICANT CHANGE UP
LYMPHOCYTES # BLD AUTO: 2.34 K/UL — SIGNIFICANT CHANGE UP (ref 1–3.3)
LYMPHOCYTES # BLD AUTO: 27.2 % — SIGNIFICANT CHANGE UP (ref 13–44)
MCHC RBC-ENTMCNC: 30.7 PG — SIGNIFICANT CHANGE UP (ref 27–34)
MCHC RBC-ENTMCNC: 34.1 % — SIGNIFICANT CHANGE UP (ref 32–36)
MCV RBC AUTO: 90 FL — SIGNIFICANT CHANGE UP (ref 80–100)
MONOCYTES # BLD AUTO: 0.67 K/UL — SIGNIFICANT CHANGE UP (ref 0–0.9)
MONOCYTES NFR BLD AUTO: 7.8 % — SIGNIFICANT CHANGE UP (ref 2–14)
MUCOUS THREADS # UR AUTO: SIGNIFICANT CHANGE UP
NEUTROPHILS # BLD AUTO: 5.49 K/UL — SIGNIFICANT CHANGE UP (ref 1.8–7.4)
NEUTROPHILS NFR BLD AUTO: 63.9 % — SIGNIFICANT CHANGE UP (ref 43–77)
NITRITE UR-MCNC: POSITIVE — HIGH
NRBC # FLD: 0 — SIGNIFICANT CHANGE UP
PH UR: 7.5 — SIGNIFICANT CHANGE UP (ref 4.6–8)
PLATELET # BLD AUTO: 182 K/UL — SIGNIFICANT CHANGE UP (ref 150–400)
PMV BLD: 11.4 FL — SIGNIFICANT CHANGE UP (ref 7–13)
POTASSIUM SERPL-MCNC: 3.5 MMOL/L — SIGNIFICANT CHANGE UP (ref 3.5–5.3)
POTASSIUM SERPL-SCNC: 3.5 MMOL/L — SIGNIFICANT CHANGE UP (ref 3.5–5.3)
PROT SERPL-MCNC: 7.1 G/DL — SIGNIFICANT CHANGE UP (ref 6–8.3)
PROT UR-MCNC: 10 MG/DL — SIGNIFICANT CHANGE UP
RBC # BLD: 4.2 M/UL — SIGNIFICANT CHANGE UP (ref 3.8–5.2)
RBC # FLD: 11.3 % — SIGNIFICANT CHANGE UP (ref 10.3–14.5)
RBC CASTS # UR COMP ASSIST: SIGNIFICANT CHANGE UP (ref 0–?)
SODIUM SERPL-SCNC: 140 MMOL/L — SIGNIFICANT CHANGE UP (ref 135–145)
SP GR SPEC: 1.02 — SIGNIFICANT CHANGE UP (ref 1–1.04)
SQUAMOUS # UR AUTO: SIGNIFICANT CHANGE UP
UROBILINOGEN FLD QL: 4 MG/DL — HIGH
WBC # BLD: 8.6 K/UL — SIGNIFICANT CHANGE UP (ref 3.8–10.5)
WBC # FLD AUTO: 8.6 K/UL — SIGNIFICANT CHANGE UP (ref 3.8–10.5)
WBC UR QL: SIGNIFICANT CHANGE UP (ref 0–?)

## 2018-08-13 PROCEDURE — 99285 EMERGENCY DEPT VISIT HI MDM: CPT

## 2018-08-13 PROCEDURE — 72125 CT NECK SPINE W/O DYE: CPT | Mod: 26

## 2018-08-13 PROCEDURE — 70450 CT HEAD/BRAIN W/O DYE: CPT | Mod: 26

## 2018-08-13 PROCEDURE — 76830 TRANSVAGINAL US NON-OB: CPT | Mod: 26

## 2018-08-13 RX ORDER — OXYCODONE AND ACETAMINOPHEN 5; 325 MG/1; MG/1
1 TABLET ORAL
Qty: 12 | Refills: 0
Start: 2018-08-13 | End: 2018-08-15

## 2018-08-13 RX ORDER — MORPHINE SULFATE 50 MG/1
4 CAPSULE, EXTENDED RELEASE ORAL ONCE
Qty: 0 | Refills: 0 | Status: DISCONTINUED | OUTPATIENT
Start: 2018-08-13 | End: 2018-08-13

## 2018-08-13 RX ORDER — KETOROLAC TROMETHAMINE 30 MG/ML
30 SYRINGE (ML) INJECTION ONCE
Qty: 0 | Refills: 0 | Status: DISCONTINUED | OUTPATIENT
Start: 2018-08-13 | End: 2018-08-13

## 2018-08-13 RX ORDER — OXYCODONE AND ACETAMINOPHEN 5; 325 MG/1; MG/1
1 TABLET ORAL ONCE
Qty: 0 | Refills: 0 | Status: DISCONTINUED | OUTPATIENT
Start: 2018-08-13 | End: 2018-08-13

## 2018-08-13 RX ADMIN — OXYCODONE AND ACETAMINOPHEN 1 TABLET(S): 5; 325 TABLET ORAL at 20:00

## 2018-08-13 RX ADMIN — MORPHINE SULFATE 4 MILLIGRAM(S): 50 CAPSULE, EXTENDED RELEASE ORAL at 17:30

## 2018-08-13 RX ADMIN — Medication 30 MILLIGRAM(S): at 16:36

## 2018-08-13 RX ADMIN — Medication 30 MILLIGRAM(S): at 17:30

## 2018-08-13 NOTE — CONSULT NOTE ADULT - SUBJECTIVE AND OBJECTIVE BOX
Neurology Consult Note    "31 y/o female no pmh c/o LLQ abd LUE pain x 1 day. Pt admits to developed LLQ abd pain last night, similar to previous ovarian cyst pain. Pt described pain as intermittent and sharp, relieved with motrin and lying on her side, worse with palpation. Pt states that today her pain became worse and radiated to L flank. Pt then developed sudden onset LUE pain and tingling and left lower back pain. Pt now c/o LUE pain, worse with movement, no relieving factors. Pt also admits to tingling to LUE and mild weakness. Pt denies chest pain, sob, n/v/d, dysuria, hematuria, vaginal bleeding, dizziness, syncope, change in vision, slurred speech, fever or chills."    Neurology consulted for LUE pain/numbess/?weakness.    Allergies    amoxicillin (Anaphylaxis)  amoxicillin (Rash)  amoxicillin (Rash)  penicillin (Hives)    Intolerances    PAST MEDICAL & SURGICAL HISTORY:  No pertinent past medical history  Ovarian cyst  Anxiety  Breech presentation  S/P   Status post dilation and curettage:     Vital Signs Last 24 Hrs  T(C): 36.5 (13 Aug 2018 14:34), Max: 36.5 (13 Aug 2018 14:34)  T(F): 97.7 (13 Aug 2018 14:34), Max: 97.7 (13 Aug 2018 14:34)  HR: 97 (13 Aug 2018 14:34) (97 - 97)  BP: 138/90 (13 Aug 2018 14:34) (138/90 - 138/90)  BP(mean): --  RR: 18 (13 Aug 2018 14:34) (18 - 18)  SpO2: 98% (13 Aug 2018 14:34) (98% - 98%)    Neurological Exam    Labs                        12.9   8.60  )-----------( 182      ( 13 Aug 2018 16:02 )             37.8   08-13    140  |  103  |  9   ----------------------------<  97  3.5   |  24  |  0.73    Ca    9.2      13 Aug 2018 16:02    TPro  7.1  /  Alb  4.5  /  TBili  1.1  /  DBili  x   /  AST  18  /  ALT  14  /  AlkPhos  47  08-13    Imaging  Pending Neurology Consult Note    "31 y/o female no pmh c/o LLQ abd LUE pain x 1 day. Pt admits to developed LLQ abd pain last night, similar to previous ovarian cyst pain. Pt described pain as intermittent and sharp, relieved with motrin and lying on her side, worse with palpation. Pt states that today her pain became worse and radiated to L flank. Pt then developed sudden onset LUE pain and tingling and left lower back pain. Pt now c/o LUE pain, worse with movement, no relieving factors. Pt also admits to tingling to LUE and mild weakness. Pt denies chest pain, sob, n/v/d, dysuria, hematuria, vaginal bleeding, dizziness, syncope, change in vision, slurred speech, fever or chills."    Neurology consulted for LUE pain/numbess/?weakness.    Allergies    amoxicillin (Anaphylaxis)  amoxicillin (Rash)  amoxicillin (Rash)  penicillin (Hives)    Intolerances    PAST MEDICAL & SURGICAL HISTORY:  No pertinent past medical history  Ovarian cyst  Anxiety  Breech presentation  S/P   Status post dilation and curettage:     Vital Signs Last 24 Hrs  T(C): 36.5 (13 Aug 2018 14:34), Max: 36.5 (13 Aug 2018 14:34)  T(F): 97.7 (13 Aug 2018 14:34), Max: 97.7 (13 Aug 2018 14:34)  HR: 97 (13 Aug 2018 14:34) (97 - 97)  BP: 138/90 (13 Aug 2018 14:34) (138/90 - 138/90)  BP(mean): --  RR: 18 (13 Aug 2018 14:34) (18 - 18)  SpO2: 98% (13 Aug 2018 14:34) (98% - 98%)    Neurological Exam  MS - AAOx3, speech fluent  CNs -EOMI, face symmetric, sensation in tact b/l  Motor - no drift x 4, left LE and LE limited range of motion 2/2 pain howver no drift  Sensory - light touch, pin prick, vibration and proprioception in tact throughout, no sensory level  Coordination - FNF in tact b/l  Gait - pt is able to walk unassisted however antalgic gait.    Reflexes - triceps biceps brachioradialis 2+. Patellars 2+ and ankle jerks present b/l, downgoing babinski's     Labs                        12.9   8.60  )-----------( 182      ( 13 Aug 2018 16:02 )             37.8   08-13    140  |  103  |  9   ----------------------------<  97  3.5   |  24  |  0.73    Ca    9.2      13 Aug 2018 16:02    TPro  7.1  /  Alb  4.5  /  TBili  1.1  /  DBili  x   /  AST  18  /  ALT  14  /  AlkPhos  47  08-13    Imaging  Pending

## 2018-08-13 NOTE — ED PROVIDER NOTE - PROGRESS NOTE DETAILS
TORI Hutson- Pt feeling better after ER stay, pain improving, neuro evaluated pt, no neuro deficits, do not think this is primary neurologic pathology. Pt can f/u as an outpatient as needed. Pt also to f/u with GYN. stable for dc.

## 2018-08-13 NOTE — ED ADULT NURSE NOTE - OBJECTIVE STATEMENT
Patient received AA&Ox3 c/o sharp, severe pain to left flank that started this afternoon - pt. with h/o ruptured ovarian cyst. VSS on RA. Patient denies chest pain, N/V, SOB, fever, chills, dyspnea, dizziness at this time. Patient able to ambulate at baseline; however, pt. has difficulty ambulating d/t severe pain. 20g PIV in place to right AC, labs drawn - will continue to monitor.

## 2018-08-13 NOTE — ED ADULT TRIAGE NOTE - CHIEF COMPLAINT QUOTE
p/t with hx ruptured ovarian cyst, c/o of lt flank pain since this afternoon, p/t appears uncomfortable

## 2018-08-13 NOTE — CONSULT NOTE ADULT - ASSESSMENT
33 y/o female no pmh c/o LLQ abd LUE pain x 1 day. Pt admits to developed LLQ abd pain last night, similar to previous ovarian cyst pain. Pt described pain as intermittent and sharp, relieved with motrin and lying on her side, worse with palpation. Pt states that today her pain became worse and radiated to L flank. Pt then developed sudden onset LUE pain and tingling and left lower back pain.    Impression:    Recomendations: 33 y/o female no pmh c/o LLQ abd LUE pain x 1 day. Pt admits to developed LLQ abd pain last night, similar to previous ovarian cyst pain. Pt described pain as intermittent and sharp, relieved with motrin and lying on her side, worse with palpation. Pt states that today her pain became worse and radiated to L flank. Pt then developed sudden onset LUE pain and tingling and left lower back pain. On Neurological exam, pt has no deficits, no sensory impairments, no sensory level and has a positive laneir' s sign, normal reflexes, downgoing toes b/l no sensory level.     Impression: Possible Myopathic pain. Pain does not fit a dermatomal pattern to suggest a radicular like pain. No signs of spinal cord involvement.      Recommendations:  Pain control prn  Pt may follow up with Dr. Nissenbaum as an outpatient for further monitoring and outpatient workup, discussed with ED 33 y/o female no pmh c/o LLQ abd LUE pain x 1 day. Pt admits to developed LLQ abd pain last night, similar to previous ovarian cyst pain. Pt described pain as intermittent and sharp, relieved with motrin and lying on her side, worse with palpation. Pt states that today her pain became worse and radiated to L flank. Pt then developed sudden onset LUE pain and tingling and left lower back pain. On Neurological exam, pt has no deficits, no sensory impairments, no sensory level and has a positive lanier' s sign, normal reflexes, downgoing toes b/l no sensory level.     Impression: Possible Myopathic pain. Pain does not fit a dermatomal pattern to suggest a radicular like pain. No signs of spinal cord involvement.      Recommendations:  Pain control prn  Pt may follow up with Dr. Nissenbaum as an outpatient for further monitoring and outpatient workup, discussed with ED - [phone].

## 2018-08-13 NOTE — ED PROVIDER NOTE - OBJECTIVE STATEMENT
33 y/o female no pmh c/o LLQ abd LUE pain x 1 day. Pt admits to developed LLQ abd pain last night, similar to rpevious ovarian cyst pain. Pt described pain as intermittent and sharp, relieved with motrin and lying on her side, worse with palpation. Pt states that today her pain became worse and radiated to L flank. Pt then developed sudden onset LUE pain and tingling and left lower back pain. Pt now c/o LUE pain, worse with movement, no releiving factors. Pt also admit sto tingling to LUE and mild weakness. Pt denies chest pain, sob, n/v/d, dysuria, hematuria, vaginal bleeding, dizziness, syncope, change in vision, slurred speech, fever or chills. 33 y/o female no pmh c/o LLQ abd LUE pain x 1 day. Pt admits to developed LLQ abd pain last night, similar to previous ovarian cyst pain. Pt described pain as intermittent and sharp, relieved with motrin and lying on her side, worse with palpation. Pt states that today her pain became worse and radiated to L flank. Pt then developed sudden onset LUE pain and tingling and left lower back pain. Pt now c/o LUE pain, worse with movement, no releiving factors. Pt also admit sto tingling to LUE and mild weakness. Pt denies chest pain, sob, n/v/d, dysuria, hematuria, vaginal bleeding, dizziness, syncope, change in vision, slurred speech, fever or chills. 33 y/o female no pmh c/o LLQ abd LUE pain x 1 day. Pt admits to developed LLQ abd pain last night, similar to previous ovarian cyst pain. Pt described pain as intermittent and sharp, relieved with motrin and lying on her side, worse with palpation. Pt states that today her pain became worse and radiated to L flank. Pt then developed LUE pain and tingling and left lower back pain. Pt now c/o LUE pain, worse with movement, no releiving factors. Pt also admit sto tingling to LUE and mild weakness. Pt denies chest pain, sob, n/v/d, dysuria, hematuria, vaginal bleeding, dizziness, syncope, change in vision, slurred speech, fever or chills.

## 2018-08-13 NOTE — ED PROVIDER NOTE - ATTENDING CONTRIBUTION TO CARE
Dr. Pal: 33 yo female with PMH ovarian cysts, in ED with progressive symptoms.  Pt first felt LLQ pain consistent with previous ovarian cysts.  Following this the pain worsened and radiated into her left flank and was associated with tingling and weakness in left UE/LE.  No CP/SOB, N/V/D or vaginal bleeding.  Pain feels better when pt lays on right side of body.  On exam pt uncomfortable appearing, in NAD, heart RRR, lungs CTAB, abd NTND, extremities without swelling, strength 4+/5 in all extremities and skin without rash. Dr. Pal: 31 yo female with PMH ovarian cysts, in ED with progressive symptoms.  Pt first felt LLQ pain consistent with previous ovarian cysts.  Following this the pain worsened and radiated into her left flank and was associated with tingling and weakness in left UE/LE.  No CP/SOB, N/V/D or vaginal bleeding.  Pain feels better when pt lays on right side of body.  On exam pt uncomfortable appearing, in NAD, heart RRR, lungs CTAB, abd NTND, extremities without swelling, strength 4+/5 in left UE/LE and 5/5 in right UE/LE and skin without rash.

## 2018-08-15 LAB — SPECIMEN SOURCE: SIGNIFICANT CHANGE UP

## 2018-08-16 LAB
-  AMIKACIN: SIGNIFICANT CHANGE UP
-  AMPICILLIN/SULBACTAM: SIGNIFICANT CHANGE UP
-  AMPICILLIN: SIGNIFICANT CHANGE UP
-  AZTREONAM: SIGNIFICANT CHANGE UP
-  CEFAZOLIN: SIGNIFICANT CHANGE UP
-  CEFEPIME: SIGNIFICANT CHANGE UP
-  CEFOXITIN: SIGNIFICANT CHANGE UP
-  CEFTAZIDIME: SIGNIFICANT CHANGE UP
-  CEFTRIAXONE: SIGNIFICANT CHANGE UP
-  CIPROFLOXACIN: SIGNIFICANT CHANGE UP
-  ERTAPENEM: SIGNIFICANT CHANGE UP
-  GENTAMICIN: SIGNIFICANT CHANGE UP
-  IMIPENEM: SIGNIFICANT CHANGE UP
-  LEVOFLOXACIN: SIGNIFICANT CHANGE UP
-  MEROPENEM: SIGNIFICANT CHANGE UP
-  NITROFURANTOIN: SIGNIFICANT CHANGE UP
-  PIPERACILLIN/TAZOBACTAM: SIGNIFICANT CHANGE UP
-  TIGECYCLINE: SIGNIFICANT CHANGE UP
-  TOBRAMYCIN: SIGNIFICANT CHANGE UP
-  TRIMETHOPRIM/SULFAMETHOXAZOLE: SIGNIFICANT CHANGE UP
BACTERIA UR CULT: SIGNIFICANT CHANGE UP
METHOD TYPE: SIGNIFICANT CHANGE UP
ORGANISM # SPEC MICROSCOPIC CNT: SIGNIFICANT CHANGE UP
ORGANISM # SPEC MICROSCOPIC CNT: SIGNIFICANT CHANGE UP

## 2018-08-16 NOTE — ED POST DISCHARGE NOTE - DETAILS
Called [phone] busy signal and [phone] let message to call back ED. Plan: Admin team to follow ucx to final and to call pt again

## 2018-08-19 RX ORDER — NITROFURANTOIN MACROCRYSTAL 50 MG
1 CAPSULE ORAL
Qty: 14 | Refills: 0
Start: 2018-08-19 | End: 2018-08-25

## 2018-11-28 ENCOUNTER — EMERGENCY (EMERGENCY)
Facility: HOSPITAL | Age: 33
LOS: 1 days | Discharge: HOME | End: 2018-11-28
Attending: EMERGENCY MEDICINE | Admitting: EMERGENCY MEDICINE
Payer: COMMERCIAL

## 2018-11-28 VITALS
HEART RATE: 86 BPM | OXYGEN SATURATION: 99 % | RESPIRATION RATE: 16 BRPM | TEMPERATURE: 98 F | DIASTOLIC BLOOD PRESSURE: 68 MMHG | SYSTOLIC BLOOD PRESSURE: 107 MMHG

## 2018-11-28 VITALS
OXYGEN SATURATION: 100 % | RESPIRATION RATE: 16 BRPM | DIASTOLIC BLOOD PRESSURE: 79 MMHG | SYSTOLIC BLOOD PRESSURE: 147 MMHG | TEMPERATURE: 98 F | HEART RATE: 89 BPM

## 2018-11-28 DIAGNOSIS — Z98.89 OTHER SPECIFIED POSTPROCEDURAL STATES: Chronic | ICD-10-CM

## 2018-11-28 DIAGNOSIS — Z98.891 HISTORY OF UTERINE SCAR FROM PREVIOUS SURGERY: Chronic | ICD-10-CM

## 2018-11-28 LAB
APPEARANCE UR: SIGNIFICANT CHANGE UP
BACTERIA # UR AUTO: HIGH
BILIRUB UR-MCNC: NEGATIVE — SIGNIFICANT CHANGE UP
BLOOD UR QL VISUAL: NEGATIVE — SIGNIFICANT CHANGE UP
COLOR SPEC: YELLOW — SIGNIFICANT CHANGE UP
EPI CELLS # UR: SIGNIFICANT CHANGE UP
GLUCOSE UR-MCNC: NEGATIVE — SIGNIFICANT CHANGE UP
HYALINE CASTS # UR AUTO: HIGH
KETONES UR-MCNC: NEGATIVE — SIGNIFICANT CHANGE UP
LEUKOCYTE ESTERASE UR-ACNC: NEGATIVE — SIGNIFICANT CHANGE UP
MUCOUS THREADS # UR AUTO: SIGNIFICANT CHANGE UP
NITRITE UR-MCNC: POSITIVE — HIGH
PH UR: 6.5 — SIGNIFICANT CHANGE UP (ref 5–8)
PROT UR-MCNC: 20 — SIGNIFICANT CHANGE UP
RBC CASTS # UR COMP ASSIST: SIGNIFICANT CHANGE UP (ref 0–?)
SP GR SPEC: 1.03 — SIGNIFICANT CHANGE UP (ref 1–1.04)
SQUAMOUS # UR AUTO: SIGNIFICANT CHANGE UP
UROBILINOGEN FLD QL: HIGH
WBC UR QL: SIGNIFICANT CHANGE UP (ref 0–?)

## 2018-11-28 PROCEDURE — 71046 X-RAY EXAM CHEST 2 VIEWS: CPT | Mod: 26

## 2018-11-28 PROCEDURE — 99283 EMERGENCY DEPT VISIT LOW MDM: CPT

## 2018-11-28 RX ORDER — NITROFURANTOIN MACROCRYSTAL 50 MG
1 CAPSULE ORAL
Qty: 14 | Refills: 0
Start: 2018-11-28 | End: 2018-12-04

## 2018-11-28 RX ORDER — NITROFURANTOIN MACROCRYSTAL 50 MG
100 CAPSULE ORAL ONCE
Qty: 0 | Refills: 0 | Status: COMPLETED | OUTPATIENT
Start: 2018-11-28 | End: 2018-11-28

## 2018-11-28 RX ORDER — PSEUDOEPHEDRINE HCL 30 MG
30 TABLET ORAL ONCE
Qty: 0 | Refills: 0 | Status: COMPLETED | OUTPATIENT
Start: 2018-11-28 | End: 2018-11-28

## 2018-11-28 RX ORDER — IBUPROFEN 200 MG
600 TABLET ORAL ONCE
Qty: 0 | Refills: 0 | Status: COMPLETED | OUTPATIENT
Start: 2018-11-28 | End: 2018-11-28

## 2018-11-28 RX ADMIN — Medication 100 MILLIGRAM(S): at 14:22

## 2018-11-28 RX ADMIN — Medication 100 MILLIGRAM(S): at 12:39

## 2018-11-28 RX ADMIN — Medication 30 MILLIGRAM(S): at 12:39

## 2018-11-28 RX ADMIN — Medication 600 MILLIGRAM(S): at 12:39

## 2018-11-28 NOTE — ED PROVIDER NOTE - OBJECTIVE STATEMENT
32 y/o f presents with nasal congestion/cough/throat pain. Patient states for last 2 weeks, had congestion with cough productive of yellow/clear phlegm.  Patients child was sick for last few weeks, diagnosed with pneumonia-treated with antibiotics. Had 2 episodes fever last week. No associated ha, cp, sob, abd pain, vomiting, diarrhea. ALso notes urinary frequency, no dysuria or hematuria. NO recent travel. tolerating po without difficulty.

## 2018-11-28 NOTE — ED PROVIDER NOTE - NSFOLLOWUPINSTRUCTIONS_ED_ALL_ED_FT
Please take tessalon perles 100mg twice a day for cough.  Please take macrobid 100mg twice a day for 7 days for urinary tract infection.  Please follow up with your regular doctor within next 48 hours.  Please return for any new/worse problem.

## 2018-11-28 NOTE — ED ADULT TRIAGE NOTE - CHIEF COMPLAINT QUOTE
Pt. c/o productive cough, congestion, headache and nausea x 2 weeks. Son diagnosed with pneumonia last week.

## 2018-11-28 NOTE — ED PROVIDER NOTE - PROGRESS NOTE DETAILS
macrobid given for uti, no pna on xr, rec symptomatic treatment. All results d/w patient and copies given with instructions to bring with them to their follow up appointment.  The patient was given verbal and written discharge instructions Specifically, instructions when to return to the ED and to seek follow-up from their pcp within 1-2 days. The patient understands that should their symptoms worsen or any new symptoms arise, they should return to the ED immediately for further evaluation.  Vss, NAD, tolerating po and ambulating steadily at discharge.

## 2018-11-28 NOTE — ED PROVIDER NOTE - MEDICAL DECISION MAKING DETAILS
Patient presents with cough, nasal congestion, throat pain, and urinary frequency x 2 weeks, +sick contact at home with pneumonia, nontoxic appearing, likely viral given constellation of symptoms, will do cxr, ua, symptomatic treatment, reass.

## 2019-01-15 ENCOUNTER — EMERGENCY (EMERGENCY)
Facility: HOSPITAL | Age: 34
LOS: 1 days | Discharge: HOME | End: 2019-01-15
Attending: EMERGENCY MEDICINE | Admitting: EMERGENCY MEDICINE
Payer: COMMERCIAL

## 2019-01-15 VITALS
HEART RATE: 66 BPM | DIASTOLIC BLOOD PRESSURE: 82 MMHG | TEMPERATURE: 98 F | OXYGEN SATURATION: 100 % | RESPIRATION RATE: 16 BRPM | SYSTOLIC BLOOD PRESSURE: 121 MMHG

## 2019-01-15 DIAGNOSIS — Z98.891 HISTORY OF UTERINE SCAR FROM PREVIOUS SURGERY: Chronic | ICD-10-CM

## 2019-01-15 DIAGNOSIS — Z98.89 OTHER SPECIFIED POSTPROCEDURAL STATES: Chronic | ICD-10-CM

## 2019-01-15 LAB
ALBUMIN SERPL ELPH-MCNC: 4.8 G/DL — SIGNIFICANT CHANGE UP (ref 3.3–5)
ALP SERPL-CCNC: 52 U/L — SIGNIFICANT CHANGE UP (ref 40–120)
ALT FLD-CCNC: 11 U/L — SIGNIFICANT CHANGE UP (ref 4–33)
ANION GAP SERPL CALC-SCNC: 13 MEQ/L — SIGNIFICANT CHANGE UP (ref 7–14)
AST SERPL-CCNC: 14 U/L — SIGNIFICANT CHANGE UP (ref 4–32)
BASOPHILS # BLD AUTO: 0.04 K/UL — SIGNIFICANT CHANGE UP (ref 0–0.2)
BASOPHILS NFR BLD AUTO: 0.5 % — SIGNIFICANT CHANGE UP (ref 0–2)
BILIRUB SERPL-MCNC: 0.8 MG/DL — SIGNIFICANT CHANGE UP (ref 0.2–1.2)
BUN SERPL-MCNC: 11 MG/DL — SIGNIFICANT CHANGE UP (ref 7–23)
CALCIUM SERPL-MCNC: 9.4 MG/DL — SIGNIFICANT CHANGE UP (ref 8.4–10.5)
CHLORIDE SERPL-SCNC: 102 MMOL/L — SIGNIFICANT CHANGE UP (ref 98–107)
CO2 SERPL-SCNC: 24 MMOL/L — SIGNIFICANT CHANGE UP (ref 22–31)
CREAT SERPL-MCNC: 0.84 MG/DL — SIGNIFICANT CHANGE UP (ref 0.5–1.3)
EOSINOPHIL # BLD AUTO: 0.08 K/UL — SIGNIFICANT CHANGE UP (ref 0–0.5)
EOSINOPHIL NFR BLD AUTO: 1 % — SIGNIFICANT CHANGE UP (ref 0–6)
GLUCOSE SERPL-MCNC: 107 MG/DL — HIGH (ref 70–99)
HCG SERPL-ACNC: < 5 MIU/ML — SIGNIFICANT CHANGE UP
HCT VFR BLD CALC: 40.8 % — SIGNIFICANT CHANGE UP (ref 34.5–45)
HGB BLD-MCNC: 13.5 G/DL — SIGNIFICANT CHANGE UP (ref 11.5–15.5)
IMM GRANULOCYTES NFR BLD AUTO: 0.2 % — SIGNIFICANT CHANGE UP (ref 0–1.5)
LYMPHOCYTES # BLD AUTO: 2.61 K/UL — SIGNIFICANT CHANGE UP (ref 1–3.3)
LYMPHOCYTES # BLD AUTO: 32.3 % — SIGNIFICANT CHANGE UP (ref 13–44)
MCHC RBC-ENTMCNC: 30.2 PG — SIGNIFICANT CHANGE UP (ref 27–34)
MCHC RBC-ENTMCNC: 33.1 % — SIGNIFICANT CHANGE UP (ref 32–36)
MCV RBC AUTO: 91.3 FL — SIGNIFICANT CHANGE UP (ref 80–100)
MONOCYTES # BLD AUTO: 0.5 K/UL — SIGNIFICANT CHANGE UP (ref 0–0.9)
MONOCYTES NFR BLD AUTO: 6.2 % — SIGNIFICANT CHANGE UP (ref 2–14)
NEUTROPHILS # BLD AUTO: 4.82 K/UL — SIGNIFICANT CHANGE UP (ref 1.8–7.4)
NEUTROPHILS NFR BLD AUTO: 59.8 % — SIGNIFICANT CHANGE UP (ref 43–77)
NRBC # FLD: 0 K/UL — LOW (ref 25–125)
PLATELET # BLD AUTO: 192 K/UL — SIGNIFICANT CHANGE UP (ref 150–400)
PMV BLD: 10.6 FL — SIGNIFICANT CHANGE UP (ref 7–13)
POTASSIUM SERPL-MCNC: 3.9 MMOL/L — SIGNIFICANT CHANGE UP (ref 3.5–5.3)
POTASSIUM SERPL-SCNC: 3.9 MMOL/L — SIGNIFICANT CHANGE UP (ref 3.5–5.3)
PROT SERPL-MCNC: 7 G/DL — SIGNIFICANT CHANGE UP (ref 6–8.3)
RBC # BLD: 4.47 M/UL — SIGNIFICANT CHANGE UP (ref 3.8–5.2)
RBC # FLD: 11.5 % — SIGNIFICANT CHANGE UP (ref 10.3–14.5)
SODIUM SERPL-SCNC: 139 MMOL/L — SIGNIFICANT CHANGE UP (ref 135–145)
WBC # BLD: 8.07 K/UL — SIGNIFICANT CHANGE UP (ref 3.8–10.5)
WBC # FLD AUTO: 8.07 K/UL — SIGNIFICANT CHANGE UP (ref 3.8–10.5)

## 2019-01-15 PROCEDURE — 99284 EMERGENCY DEPT VISIT MOD MDM: CPT

## 2019-01-15 PROCEDURE — 70480 CT ORBIT/EAR/FOSSA W/O DYE: CPT | Mod: 26

## 2019-01-15 RX ORDER — ACETAMINOPHEN 500 MG
975 TABLET ORAL ONCE
Qty: 0 | Refills: 0 | Status: COMPLETED | OUTPATIENT
Start: 2019-01-15 | End: 2019-01-15

## 2019-01-15 RX ORDER — CIPROFLOXACIN AND DEXAMETHASONE 3; 1 MG/ML; MG/ML
4 SUSPENSION/ DROPS AURICULAR (OTIC) ONCE
Qty: 0 | Refills: 0 | Status: COMPLETED | OUTPATIENT
Start: 2019-01-15 | End: 2019-01-15

## 2019-01-15 RX ORDER — DEXAMETHASONE 0.5 MG/5ML
10 ELIXIR ORAL ONCE
Qty: 0 | Refills: 0 | Status: COMPLETED | OUTPATIENT
Start: 2019-01-15 | End: 2019-01-15

## 2019-01-15 RX ADMIN — CIPROFLOXACIN AND DEXAMETHASONE 4 DROP(S): 3; 1 SUSPENSION/ DROPS AURICULAR (OTIC) at 17:59

## 2019-01-15 RX ADMIN — Medication 10 MILLIGRAM(S): at 14:41

## 2019-01-15 RX ADMIN — Medication 975 MILLIGRAM(S): at 14:30

## 2019-01-15 NOTE — ED PROVIDER NOTE - ENMT, MLM
+Right TM with mild erythema and bulging. Left TM normal. Throat with mild erythema, no enlarged tonsils, +Tenderness to left submandibular region.

## 2019-01-15 NOTE — ED PROVIDER NOTE - NSFOLLOWUPINSTRUCTIONS_ED_ALL_ED_FT
1. Return to ED for worsening, progressive or any other concerning symptoms such as trouble breathing, severe pain, trouble walking, inability to eat/drink due to vomiting, or confusion  2. Follow up with your primary care doctor in 2-3 days. If you do not have a primary doctor please call the general internal medicine clinic to make an appointment  3. Call one of the ENT specialists on the attached sheet to make a follow up appointment if you continue having ear pain  4. Please take motrin 600 mg every 6 hours and tylenol 1000 mg every 6 hours as needed for pain control  5. Continue taking your antibiotic by mouth as prescribed for the full course of treatment even if symptoms go away before then  6. Begin using antibiotic ear drops in the left ear, 4 drops twice daily for 7 days, even if symptoms go away before then

## 2019-01-15 NOTE — ED PROVIDER NOTE - NSFOLLOWUPCLINICS_GEN_ALL_ED_FT
[facility] General Internal Medicine  General Internal Medicine  [address] [id number]  Phone: [phone]  Fax:   Follow Up Time:

## 2019-01-15 NOTE — ED PROVIDER NOTE - MEDICAL DECISION MAKING DETAILS
very well appearing with no medical history but significant tenderness to left periauricular area without redness or swelling, does not appear to be lymphadenopathy or abscess/neck space infection, is mildly tender over mastoid, would be atypical for mastoiditis but possible given tenderness. plan: labs, pain control, ct IAC r/o mastoiditis

## 2019-01-15 NOTE — ED PROVIDER NOTE - OBJECTIVE STATEMENT
32 y/o female with a PMHx of ovarian cyst presents to the ED c/o R ear pain since last night. Pt states pain started last night so she took ibuprofen and went to sleep. Today, pain is persistent and worsening. At time of eval, pt reports R sided neck and jaw pain with R ear throbbing. pt seen at [facility] and was given Motrin and azithromycin but symptoms are persistent. Denies fevers, vision changes, difficulty ambulating, SOB, vomiting, cough, congestion. No other acute complaints at time of eval. Allergy to amoxicillin (pt reacts with itchy rash). PCP: Dr. Newell.

## 2019-01-15 NOTE — ED ADULT TRIAGE NOTE - CHIEF COMPLAINT QUOTE
Patient c/o right ear pain since last night, today the pain spread to her neck and jaw on the right side and her tongue feels numb on the right side. She was seen today at [facility] given Motrin and an antibiotic, but not feeling better.

## 2019-01-15 NOTE — ED PROVIDER NOTE - ATTENDING CONTRIBUTION TO CARE
Pt was seen and evaluated by me. Pt is a 32 y/o female with a PMHx of ovarian cyst who presented to the ED for R ear pain X 1 day. Pt states starting yesterday having right ear pain. Pt was seen at [facility] today and was given Motrin and azithromycin but notes symptoms are persistent. Pt denies any fever, chills, nausea, vomiting, SOB, chest pain, or abd pain. Denies any tooth or throat pain. Lungs CTA b/l. RRR. Abd soft, non-tender. Tender to tragus and mastoid area on right. Mild erythema to TM. No LAD.

## 2019-03-20 ENCOUNTER — EMERGENCY (EMERGENCY)
Facility: HOSPITAL | Age: 34
LOS: 1 days | Discharge: HOME | End: 2019-03-20
Attending: EMERGENCY MEDICINE | Admitting: EMERGENCY MEDICINE
Payer: COMMERCIAL

## 2019-03-20 VITALS
HEART RATE: 70 BPM | OXYGEN SATURATION: 100 % | RESPIRATION RATE: 16 BRPM | DIASTOLIC BLOOD PRESSURE: 80 MMHG | SYSTOLIC BLOOD PRESSURE: 119 MMHG | TEMPERATURE: 99 F

## 2019-03-20 DIAGNOSIS — Z98.89 OTHER SPECIFIED POSTPROCEDURAL STATES: Chronic | ICD-10-CM

## 2019-03-20 DIAGNOSIS — Z98.891 HISTORY OF UTERINE SCAR FROM PREVIOUS SURGERY: Chronic | ICD-10-CM

## 2019-03-20 PROCEDURE — 71046 X-RAY EXAM CHEST 2 VIEWS: CPT | Mod: 26

## 2019-03-20 PROCEDURE — 99284 EMERGENCY DEPT VISIT MOD MDM: CPT

## 2019-03-20 RX ADMIN — Medication 1 MILLIGRAM(S): at 11:37

## 2019-03-20 NOTE — ED ADULT TRIAGE NOTE - CHIEF COMPLAINT QUOTE
BIBEMS for anxiety and chest pain around 8AM , no radiation, no SOB, no nausea/vomiting, no trauma/injury , hx: anxiety. Patient is comfortable in triage, no distress BIBEMS for anxiety and chest pain around 8AM , no radiation, no SOB, no nausea/vomiting, no trauma/injury , hx: anxiety. Patient is comfortable in triage, no distress. Patient is seen by Dr. Daniel in ambulance triage, will be seen in intake

## 2019-03-20 NOTE — ED PROVIDER NOTE - CARE PLAN
Principal Discharge DX:	Anxiety  Assessment and plan of treatment:	You were seen today for your chest pain, it is unclear what the cause is at this time.  Be sure to follow up with your primary care physician in 2-3 days for re-evaluation.  RETURN TO THE EMERGENCY DEPARTMENT IMMEDIATELY IF YOU DEVELOP SEVERE CHEST PAIN, TROUBLE BREATHING, OR FOR ANY OTHER CONCERN.  Secondary Diagnosis:	Chest pain

## 2019-03-20 NOTE — ED PROVIDER NOTE - OBJECTIVE STATEMENT
Pt is a 32 y/o F, with hx of anxiety, presenting to the ED with CP, SOB, and anxiety today. Pt states she was at work when she developed the sxs. She states that her sxs felt similar to her prior episodes of anxiety, which are typically resolved after stepping away from a crowded area. However, she states her sxs did not resolve despite removing herself from the area. CP is left sided, sharp in nature, and non radiating. Denies recent fevers. NO cocaine or tobacco use. Denies hx of DVT or PE. NO OCP use. LMP last month that was normal. Pt states she has lorazepam rx'd to her by her PMD prn anxiety, but she has not needed it for several years.

## 2019-05-10 ENCOUNTER — EMERGENCY (EMERGENCY)
Facility: HOSPITAL | Age: 34
LOS: 1 days | Discharge: HOME | End: 2019-05-10
Attending: EMERGENCY MEDICINE | Admitting: EMERGENCY MEDICINE
Payer: COMMERCIAL

## 2019-05-10 VITALS
TEMPERATURE: 98 F | DIASTOLIC BLOOD PRESSURE: 90 MMHG | SYSTOLIC BLOOD PRESSURE: 134 MMHG | HEART RATE: 96 BPM | OXYGEN SATURATION: 100 % | RESPIRATION RATE: 18 BRPM

## 2019-05-10 DIAGNOSIS — Z98.89 OTHER SPECIFIED POSTPROCEDURAL STATES: Chronic | ICD-10-CM

## 2019-05-10 DIAGNOSIS — Z98.891 HISTORY OF UTERINE SCAR FROM PREVIOUS SURGERY: Chronic | ICD-10-CM

## 2019-05-10 LAB
ANION GAP SERPL CALC-SCNC: 10 MMO/L — SIGNIFICANT CHANGE UP (ref 7–14)
APPEARANCE UR: CLEAR — SIGNIFICANT CHANGE UP
BASOPHILS # BLD AUTO: 0.03 K/UL — SIGNIFICANT CHANGE UP (ref 0–0.2)
BASOPHILS NFR BLD AUTO: 0.4 % — SIGNIFICANT CHANGE UP (ref 0–2)
BILIRUB UR-MCNC: NEGATIVE — SIGNIFICANT CHANGE UP
BLOOD UR QL VISUAL: NEGATIVE — SIGNIFICANT CHANGE UP
BUN SERPL-MCNC: 15 MG/DL — SIGNIFICANT CHANGE UP (ref 7–23)
CALCIUM SERPL-MCNC: 9.2 MG/DL — SIGNIFICANT CHANGE UP (ref 8.4–10.5)
CHLORIDE SERPL-SCNC: 104 MMOL/L — SIGNIFICANT CHANGE UP (ref 98–107)
CO2 SERPL-SCNC: 24 MMOL/L — SIGNIFICANT CHANGE UP (ref 22–31)
COLOR SPEC: YELLOW — SIGNIFICANT CHANGE UP
CREAT SERPL-MCNC: 0.9 MG/DL — SIGNIFICANT CHANGE UP (ref 0.5–1.3)
EOSINOPHIL # BLD AUTO: 0.17 K/UL — SIGNIFICANT CHANGE UP (ref 0–0.5)
EOSINOPHIL NFR BLD AUTO: 2.5 % — SIGNIFICANT CHANGE UP (ref 0–6)
GLUCOSE SERPL-MCNC: 95 MG/DL — SIGNIFICANT CHANGE UP (ref 70–99)
GLUCOSE UR-MCNC: NEGATIVE — SIGNIFICANT CHANGE UP
HCG UR-SCNC: NEGATIVE — SIGNIFICANT CHANGE UP
HCT VFR BLD CALC: 39.1 % — SIGNIFICANT CHANGE UP (ref 34.5–45)
HGB BLD-MCNC: 13 G/DL — SIGNIFICANT CHANGE UP (ref 11.5–15.5)
IMM GRANULOCYTES NFR BLD AUTO: 0.7 % — SIGNIFICANT CHANGE UP (ref 0–1.5)
KETONES UR-MCNC: NEGATIVE — SIGNIFICANT CHANGE UP
LEUKOCYTE ESTERASE UR-ACNC: NEGATIVE — SIGNIFICANT CHANGE UP
LYMPHOCYTES # BLD AUTO: 1.02 K/UL — SIGNIFICANT CHANGE UP (ref 1–3.3)
LYMPHOCYTES # BLD AUTO: 15.2 % — SIGNIFICANT CHANGE UP (ref 13–44)
MCHC RBC-ENTMCNC: 31 PG — SIGNIFICANT CHANGE UP (ref 27–34)
MCHC RBC-ENTMCNC: 33.2 % — SIGNIFICANT CHANGE UP (ref 32–36)
MCV RBC AUTO: 93.3 FL — SIGNIFICANT CHANGE UP (ref 80–100)
MONOCYTES # BLD AUTO: 0.52 K/UL — SIGNIFICANT CHANGE UP (ref 0–0.9)
MONOCYTES NFR BLD AUTO: 7.7 % — SIGNIFICANT CHANGE UP (ref 2–14)
NEUTROPHILS # BLD AUTO: 4.94 K/UL — SIGNIFICANT CHANGE UP (ref 1.8–7.4)
NEUTROPHILS NFR BLD AUTO: 73.5 % — SIGNIFICANT CHANGE UP (ref 43–77)
NITRITE UR-MCNC: NEGATIVE — SIGNIFICANT CHANGE UP
NRBC # FLD: 0 K/UL — SIGNIFICANT CHANGE UP (ref 0–0)
PH UR: 7 — SIGNIFICANT CHANGE UP (ref 5–8)
PLATELET # BLD AUTO: 162 K/UL — SIGNIFICANT CHANGE UP (ref 150–400)
PMV BLD: 11 FL — SIGNIFICANT CHANGE UP (ref 7–13)
POTASSIUM SERPL-MCNC: 4.1 MMOL/L — SIGNIFICANT CHANGE UP (ref 3.5–5.3)
POTASSIUM SERPL-SCNC: 4.1 MMOL/L — SIGNIFICANT CHANGE UP (ref 3.5–5.3)
PROT UR-MCNC: NEGATIVE — SIGNIFICANT CHANGE UP
RBC # BLD: 4.19 M/UL — SIGNIFICANT CHANGE UP (ref 3.8–5.2)
RBC # FLD: 11.3 % — SIGNIFICANT CHANGE UP (ref 10.3–14.5)
SODIUM SERPL-SCNC: 138 MMOL/L — SIGNIFICANT CHANGE UP (ref 135–145)
SP GR SPEC: 1.02 — SIGNIFICANT CHANGE UP (ref 1–1.04)
SP GR UR: 1.02 — SIGNIFICANT CHANGE UP (ref 1–1.03)
UROBILINOGEN FLD QL: HIGH
WBC # BLD: 6.73 K/UL — SIGNIFICANT CHANGE UP (ref 3.8–10.5)
WBC # FLD AUTO: 6.73 K/UL — SIGNIFICANT CHANGE UP (ref 3.8–10.5)

## 2019-05-10 PROCEDURE — 74177 CT ABD & PELVIS W/CONTRAST: CPT | Mod: 26

## 2019-05-10 PROCEDURE — 76830 TRANSVAGINAL US NON-OB: CPT | Mod: 26

## 2019-05-10 PROCEDURE — 93975 VASCULAR STUDY: CPT | Mod: 26

## 2019-05-10 PROCEDURE — 99285 EMERGENCY DEPT VISIT HI MDM: CPT | Mod: 25

## 2019-05-10 RX ORDER — ACETAMINOPHEN 500 MG
650 TABLET ORAL ONCE
Refills: 0 | Status: COMPLETED | OUTPATIENT
Start: 2019-05-10 | End: 2019-05-10

## 2019-05-10 RX ORDER — IBUPROFEN 200 MG
600 TABLET ORAL ONCE
Refills: 0 | Status: COMPLETED | OUTPATIENT
Start: 2019-05-10 | End: 2019-05-10

## 2019-05-10 RX ORDER — MORPHINE SULFATE 50 MG/1
4 CAPSULE, EXTENDED RELEASE ORAL ONCE
Refills: 0 | Status: DISCONTINUED | OUTPATIENT
Start: 2019-05-10 | End: 2019-05-10

## 2019-05-10 RX ADMIN — Medication 650 MILLIGRAM(S): at 08:40

## 2019-05-10 RX ADMIN — MORPHINE SULFATE 4 MILLIGRAM(S): 50 CAPSULE, EXTENDED RELEASE ORAL at 10:44

## 2019-05-10 RX ADMIN — MORPHINE SULFATE 4 MILLIGRAM(S): 50 CAPSULE, EXTENDED RELEASE ORAL at 11:14

## 2019-05-10 RX ADMIN — Medication 600 MILLIGRAM(S): at 08:41

## 2019-05-10 RX ADMIN — Medication 650 MILLIGRAM(S): at 09:10

## 2019-05-10 RX ADMIN — Medication 600 MILLIGRAM(S): at 09:11

## 2019-05-10 NOTE — ED PROVIDER NOTE - ATTENDING CONTRIBUTION TO CARE
I performed a face to face bedside interview with patient regarding history of present illness, review of symptoms and past medical history. I completed an independent physical exam.  I have discussed patient's plan of care.   I agree with note as stated above, having amended the EMR as needed to reflect my findings. I have discussed the assessment and plan of care.  This includes during the time I functioned as the attending physician for this patient.  Attending Contribution to Care: agree with plan of resident. PCOS and anxiety p/w lower abdominal pain that has been ongoing since yesterday. Pt states the pain started yesterday and felt like a cramping pain and was intermittent. Today the pain was worse and became constant and so she came into the ED for management. She denies any vaginal bleeding or pain. LMP was towards the end of April. pt plan to attain US and Ct if US neg. if neg workup, with no etiology of pain, pt stable for d/c at this point. vss. hd stable.

## 2019-05-10 NOTE — ED PROVIDER NOTE - PROGRESS NOTE DETAILS
Hermes Curry MD: No bleeding or discharge seen on speculum exam. On manual exam; pt is exquisitly tender over the cervix with sharp pain in the LLQ on exam. No masses felt. Hermes Curry MD: Pt states that pain is better. CT abdomen and pelvis came back as normal. Ultrasound returned as normal. Labwork grossly normal. Will d/c home with instructions to use motrin and tylenol and return precautions.

## 2019-05-10 NOTE — ED ADULT TRIAGE NOTE - CHIEF COMPLAINT QUOTE
Pt comes in for c/o lower abd pain that began yesterday as light cramping worsening over night, waking pt from sleep this morning. Pt reports pain is worse on the right then left, denies vaginal discharge or bleeding. Pt appears uncomfortable in triage, vs as noted and pt reporting that she has hx of polycystic cyst and says the feeling is similar to that from the cyst she had in the past.

## 2019-05-10 NOTE — ED PROVIDER NOTE - CLINICAL SUMMARY MEDICAL DECISION MAKING FREE TEXT BOX
32yo F with PMHx of PCOS p/w lower abdominal pain. Will obtain a pregnancy test and urinalysis. Will do a vaginal exam to rule out tenderness. Will obtain a vaginal ultrasound to rule out torsion vs. ovarian cyst rupture. Tylenol and motrin for pain for now. Low suspicion for appendicitis for now, but will reassess if workup is negative.

## 2019-05-10 NOTE — ED PROVIDER NOTE - OBJECTIVE STATEMENT
34yo F with PMHx of PCOS and anxiety p/w lower abdominal pain that has been ongoing since yesterday. Pt states the pain started yesterday and felt like a cramping pain and was intermittent. Today the pain was worse and became constant and so she came into the ED for management. She denies any vaginal bleeding or pain. LMP was towards the end of April. Currently sexually active with one partner and does not think she is having an STI. She states the pain is similar to her last episode of cyst rupture. Denies any fevers, chills, vomiting, diarrhea, cough or rhinorrhea.

## 2019-05-10 NOTE — ED PROVIDER NOTE - NSFOLLOWUPINSTRUCTIONS_ED_ALL_ED_FT
Please continue to use motrin and tylenol at home as needed for pain (limit motrin to 400mg once every 4-6 hours and tylenol to less than 4g per day). Please return to the ED if you develop any vaginal discharge, fever, worsening pain or an inability to take in fluids. Follow up with either your PCP or you OB/GYN within 2 days of discharge.

## 2019-05-10 NOTE — ED ADULT NURSE NOTE - OBJECTIVE STATEMENT
Pt rec'd in intake, c/o lower abd pain with N/V since yesterday. Pt rec'd in intake, c/o lower abd pain without N/V since yesterday. Reports pain is worse at LLQ.

## 2019-08-30 ENCOUNTER — EMERGENCY (EMERGENCY)
Facility: HOSPITAL | Age: 34
LOS: 1 days | Discharge: HOME | End: 2019-08-30
Admitting: EMERGENCY MEDICINE
Payer: COMMERCIAL

## 2019-08-30 VITALS
DIASTOLIC BLOOD PRESSURE: 86 MMHG | WEIGHT: 149.03 LBS | HEART RATE: 89 BPM | RESPIRATION RATE: 16 BRPM | SYSTOLIC BLOOD PRESSURE: 139 MMHG | OXYGEN SATURATION: 100 % | TEMPERATURE: 99 F

## 2019-08-30 DIAGNOSIS — Z98.891 HISTORY OF UTERINE SCAR FROM PREVIOUS SURGERY: Chronic | ICD-10-CM

## 2019-08-30 DIAGNOSIS — Z98.89 OTHER SPECIFIED POSTPROCEDURAL STATES: Chronic | ICD-10-CM

## 2019-08-30 PROCEDURE — 99283 EMERGENCY DEPT VISIT LOW MDM: CPT

## 2019-08-30 NOTE — ED PROVIDER NOTE - OBJECTIVE STATEMENT
33 year old female with no known PMH presents to the ED complaining of bilateral ear pain and pressure for 2 days with associated nasal congestion, pressure around bilateral eyes, sore throat and productive cough for 2 days. Pt states she went to the PMD who prescribed her ear drops but that does not seem to provide much relief. Pt denies stridor, drooling, trismus, dysphagia, dysphonia, muffled voice, headache, dizziness, neck pain, photophobia, acute vision changes, diplopia, tearing, chest pain, dyspnea, fever and chills, weakness, numbness or tingling sensation. Pt denies any other complaints.

## 2019-08-30 NOTE — ED PROVIDER NOTE - PATIENT PORTAL LINK FT
You can access the FollowMyHealth Patient Portal offered by [facility] by registering at the following website: [email or web address]. By joining [employer]’s FollowMyHealth portal, you will also be able to view your health information using other applications (apps) compatible with our system.

## 2019-08-30 NOTE — ED PROVIDER NOTE - CLINICAL SUMMARY MEDICAL DECISION MAKING FREE TEXT BOX
33 year old female with no known PMH presents to the ED complaining of bilateral ear pain and pressure for 2 days with associated nasal congestion, pressure around bilateral eyes, sore throat and productive cough for 2 days. Hemodynamically stable, afebrile, non-toxic appearing, exam grossly unremarkable. Impression: Viral Syndrome. Plan: supportive care, PMD follow up, Return precautions.

## 2019-08-30 NOTE — ED PROVIDER NOTE - NSFOLLOWUPINSTRUCTIONS_ED_ALL_ED_FT
Please take Ibuprofen 600mg every hours for pain control.  Please follow up with your PMD in 1-2 days.  If you have any new, worsened or concerning symptoms, please return to the emergency department.

## 2019-08-30 NOTE — ED ADULT TRIAGE NOTE - CHIEF COMPLAINT QUOTE
Ambulatory from home complaining of BL ear pain x 2 days, patient was seen by PMD and was prescribed ofloxacin without any relief. Patient now reports swelling to L eye, denies vision changes. NAD. VSS

## 2019-11-04 ENCOUNTER — RESULT REVIEW (OUTPATIENT)
Age: 34
End: 2019-11-04

## 2020-07-21 ENCOUNTER — RESULT REVIEW (OUTPATIENT)
Age: 35
End: 2020-07-21

## 2021-01-20 NOTE — ED ADULT NURSE NOTE - CINV DISCH EXIT CARE INSTR PROVIDE
INDICATION:

PAIN AND SWELLING/CHRONIC DVT



COMPARISON:

None.



TECHNIQUE:

Gray scale and color Doppler evaluation right lower extremity using linear high

frequency transducer.



FINDINGS:

Ultrasound examination of the right lower extremity deep venous structures from the

common femoral vein to the popliteal vein demonstrates normal compressibility flow and

wave patterns in response to respiration and augmentation.  There is no evidence for

deep venous thrombosis.  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

noted.



IMPRESSION:

No evidence for deep venous thrombosis.





<Electronically signed by Krzysztof Sanders > 01/20/21 4226 no

## 2021-03-30 NOTE — ED PROVIDER NOTE - DATE/TIME 1
Group Therapy Documentation    PATIENT'S NAME: Portillo Queen  MRN:   [id number]  :   1994  ACCT. NUMBER: [id number]  DATE OF SERVICE: 3/30/21  START TIME:  9:00 AM  END TIME: 11:00 AM  FACILITATOR(S): Lata Hooker LADC  TOPIC: BEH Group Therapy  Number of patients attending the group: 8  Group Length:  2 Hours    Group Therapy Type: Recovery strategies    Summary of Group / Topics Discussed:    Relationship/socialization and Disease of addiction      Group Attendance:  Attended group session    Patient's response to the group topic/interactions:  cooperative with task    Patient appeared to be Engaged.        Client specific details:  Portillo reported during check-in's that he was anxious and excited about discharging from  this week. Portillo processed upcoming plans to celebrate his birthday and set boundaries with friends regarding alcohol use. Portillo seemed receptive to group feedback and motivated to follow-through with his plan.    18-Dec-2017 19:35

## 2021-04-27 NOTE — ED ADULT NURSE NOTE - PAIN RATING/NUMBER SCALE (0-10): REST
Hemorrhoidectomy   WHAT YOU SHOULD KNOW:   A hemorrhoidectomy is surgery to remove hemorrhoids  Hemorrhoids are swollen blood vessels inside your rectum or on your anus  INSTRUCTIONS:   Medicines:   · Topical medicine: This may come as pads, creams, ointments, or lotions  This medicine may help decrease pain and swelling, and help your rectum heal faster  · Pain medicine: You may be given a prescription medicine to decrease pain  Do not wait until the pain is severe before you take this medicine  · Stool softeners: This medicine makes it easier for you to have a bowel movement  You may need this medicine to treat or prevent constipation  · Take your medicine as directed  Call your healthcare provider if you think your medicine is not helping or if you have side effects  Tell him if you are allergic to any medicine  Keep a list of the medicines, vitamins, and herbs you take  Include the amounts, and when and why you take them  Bring the list or the pill bottles to follow-up visits  Carry your medicine list with you in case of an emergency  Follow up with your healthcare provider as directed:  Write down your questions so you remember to ask them during your visits  Self-care:   · Warm sitz bath:  Heat may help to decrease pain  Use a sitz bath  A sitz bath is a pan that fits on the toilet bowl and has warm water in it  Ask how often to use a sitz bath  · Prevent constipation:  Eat foods that are high in fiber, and drink more liquids  High-fiber foods include fruits, vegetables, whole grains, and bran  This will help soften your bowel movements  Regular exercise may also help prevent constipation  Contact your healthcare provider if:   · You have nausea or are vomiting  · You have a fever  · It is hard to urinate or have a bowel movement  · You have pain when you urinate or have a bowel movement  · You have questions or concerns about your condition or care    Return to the emergency department if:   · You begin to bleed from your rectum and cannot get it to stop  · You have severe pain in your stomach or anus  · You cannot urinate, or you urinate very little  · Your arm or leg feels warm, tender, and painful  It may look swollen and red  · You suddenly feel lightheaded and short of breath  · You have chest pain when you take a deep breath or cough  You cough up blood  © 2014 [address] is for End User's use only and may not be sold, redistributed or otherwise used for commercial purposes  All illustrations and images included in CareNotes® are the copyrighted property of A D A M , Inc  or Marty Abernathy  The above information is an  only  It is not intended as medical advice for individual conditions or treatments  Talk to your doctor, nurse or pharmacist before following any medical regimen to see if it is safe and effective for you  8

## 2021-08-05 ENCOUNTER — EMERGENCY (EMERGENCY)
Facility: HOSPITAL | Age: 36
LOS: 0 days | Discharge: HOME | End: 2021-08-05
Attending: STUDENT IN AN ORGANIZED HEALTH CARE EDUCATION/TRAINING PROGRAM
Payer: COMMERCIAL

## 2021-08-05 VITALS
SYSTOLIC BLOOD PRESSURE: 135 MMHG | HEART RATE: 97 BPM | TEMPERATURE: 99 F | OXYGEN SATURATION: 100 % | RESPIRATION RATE: 18 BRPM | WEIGHT: 164.91 LBS | HEIGHT: 63 IN | DIASTOLIC BLOOD PRESSURE: 84 MMHG
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
## 2021-08-05 DIAGNOSIS — Z98.891 HISTORY OF UTERINE SCAR FROM PREVIOUS SURGERY: Chronic | ICD-10-CM
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
## 2021-08-05 DIAGNOSIS — Z88.0 ALLERGY STATUS TO PENICILLIN: ICD-10-CM

## 2021-08-05 DIAGNOSIS — Z88.1 ALLERGY STATUS TO OTHER ANTIBIOTIC AGENTS STATUS: ICD-10-CM
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
## 2021-08-05 DIAGNOSIS — F41.9 ANXIETY DISORDER, UNSPECIFIED: ICD-10-CM

## 2021-08-05 DIAGNOSIS — Z79.899 OTHER LONG TERM (CURRENT) DRUG THERAPY: ICD-10-CM

## 2021-08-05 DIAGNOSIS — Z98.89 OTHER SPECIFIED POSTPROCEDURAL STATES: Chronic | ICD-10-CM

## 2021-08-05 DIAGNOSIS — F41.0 PANIC DISORDER [EPISODIC PAROXYSMAL ANXIETY]: ICD-10-CM

## 2021-08-05 PROCEDURE — 99284 EMERGENCY DEPT VISIT MOD MDM: CPT

## 2021-08-05 PROCEDURE — 93010 ELECTROCARDIOGRAM REPORT: CPT

## 2021-08-05 NOTE — ED PROVIDER NOTE - PHYSICAL EXAMINATION
GEN: Awake, alert, interactive, NAD.  HEAD AND NECK: NC/AT. Airway patent. Neck supple.   EYES:  Clear b/l. EOMI. PERRL.   ENT: Moist mucus membranes.   CARDIAC: Regular rate, regular rhythm. No evident pedal edema.    RESP/CHEST: Normal respiratory effort with no use of accessory muscles or retractions. Clear throughout on auscultation.  ABD: Soft, non-distended, non-tender. No rebound, no guarding.   BACK: No midline spinal TTP. No CVAT.   EXTREMITIES: Moving all extremities with no apparent deformities.   SKIN: Warm, dry, intact normal color. No rash.   NEURO: AOx3, CN II-XII grossly intact, no focal deficits.   PSYCH: Appropriate mood and affect. Denies HI/SI/AH/VH.

## 2021-08-05 NOTE — ED ADULT NURSE NOTE - OBJECTIVE STATEMENT
35 year old female c/o feeling anxious while at work today. Patient has anxiety and takes Lexapro.  Denies any chest aping during screening.

## 2021-08-05 NOTE — ED PROVIDER NOTE - PATIENT PORTAL LINK FT
You can access the FollowMyHealth Patient Portal offered by [facility] by registering at the following website: [email or web address]. By joining [employer]’s FollowMyHealth portal, you will also be able to view your health information using other applications (apps) compatible with our system.

## 2021-08-05 NOTE — ED PROVIDER NOTE - NSFOLLOWUPCLINICS_GEN_ALL_ED_FT
[facility]  Psychiatry  [address] [place] [address]  Phone: [phone]  Fax:   Follow Up Time: Routine

## 2021-08-05 NOTE — ED PROVIDER NOTE - OBJECTIVE STATEMENT
36yo F w/ PMH anxiety pw panic attack. Pt works as officer, was in legal hearing, felt herself getting worked up and asked to step out for air, upon stepping out of court room, captmali (boss) started yelling at her face, telling her she was being insubordinate, pt started yelling back and went to bathroom to calm herself, pt tried breathing into bag, listening to water, w/o being able to calm herself. This occurred 1130. Pt feels better currently. Pt called her Psych after incident to make f/u appt.     pmh anxiety, psh cosmetic, allergy amox, meds lexapro. 36yo F w/ PMH anxiety pw panic attack. Pt works as officer, was in legal hearing, felt herself getting worked up and asked to step out for air, upon stepping out of court room, captmali (fernando) started yelling in her face, telling her she was being insubordinate, pt started yelling back and went to bathroom to calm herself, pt tried breathing into bag, listening to water, w/o being able to calm herself. This occurred 1130. Pt feels better currently. Pt called her Psych after incident to make f/u appt. ROS otherwise negative.     pmh anxiety, psh cosmetic, allergy amox, meds lexapro.

## 2021-08-05 NOTE — ED PROVIDER NOTE - CLINICAL SUMMARY MEDICAL DECISION MAKING FREE TEXT BOX
34yo F w/ PMH anxiety presents to ED s/p panic attack at work. AFVSS. Well appearing, in NAD. Symptoms resolved on ED arrival. ECG NSR. D/w patient, agree - will not obtain labs, imaging at this time. Clinical presentation most c/w anxiety, panic attack. Stable for d/c home. Pt will f/u w/ her outpatient Psych. Return signs / symptoms d/w pt. She understands and agrees w/ this plan.

## 2021-12-14 ENCOUNTER — EMERGENCY (EMERGENCY)
Facility: HOSPITAL | Age: 36
LOS: 1 days | Discharge: HOME | End: 2021-12-14
Admitting: STUDENT IN AN ORGANIZED HEALTH CARE EDUCATION/TRAINING PROGRAM
Payer: COMMERCIAL

## 2021-12-14 VITALS
SYSTOLIC BLOOD PRESSURE: 129 MMHG | OXYGEN SATURATION: 100 % | RESPIRATION RATE: 17 BRPM | TEMPERATURE: 98 F | HEART RATE: 76 BPM | DIASTOLIC BLOOD PRESSURE: 79 MMHG | HEIGHT: 63 IN

## 2021-12-14 DIAGNOSIS — Z98.891 HISTORY OF UTERINE SCAR FROM PREVIOUS SURGERY: Chronic | ICD-10-CM

## 2021-12-14 DIAGNOSIS — Z98.89 OTHER SPECIFIED POSTPROCEDURAL STATES: Chronic | ICD-10-CM

## 2021-12-14 LAB
ALBUMIN SERPL ELPH-MCNC: 4.6 G/DL — SIGNIFICANT CHANGE UP (ref 3.3–5)
ALP SERPL-CCNC: 51 U/L — SIGNIFICANT CHANGE UP (ref 40–120)
ALT FLD-CCNC: 11 U/L — SIGNIFICANT CHANGE UP (ref 4–33)
ANION GAP SERPL CALC-SCNC: 9 MMOL/L — SIGNIFICANT CHANGE UP (ref 7–14)
APPEARANCE UR: CLEAR — SIGNIFICANT CHANGE UP
APTT BLD: 34.5 SEC — SIGNIFICANT CHANGE UP (ref 27–36.3)
AST SERPL-CCNC: 12 U/L — SIGNIFICANT CHANGE UP (ref 4–32)
BASOPHILS # BLD AUTO: 0.03 K/UL — SIGNIFICANT CHANGE UP (ref 0–0.2)
BASOPHILS NFR BLD AUTO: 0.3 % — SIGNIFICANT CHANGE UP (ref 0–2)
BILIRUB SERPL-MCNC: 0.5 MG/DL — SIGNIFICANT CHANGE UP (ref 0.2–1.2)
BILIRUB UR-MCNC: NEGATIVE — SIGNIFICANT CHANGE UP
BLD GP AB SCN SERPL QL: NEGATIVE — SIGNIFICANT CHANGE UP
BUN SERPL-MCNC: 10 MG/DL — SIGNIFICANT CHANGE UP (ref 7–23)
CALCIUM SERPL-MCNC: 9.3 MG/DL — SIGNIFICANT CHANGE UP (ref 8.4–10.5)
CHLORIDE SERPL-SCNC: 103 MMOL/L — SIGNIFICANT CHANGE UP (ref 98–107)
CO2 SERPL-SCNC: 24 MMOL/L — SIGNIFICANT CHANGE UP (ref 22–31)
COLOR SPEC: YELLOW — SIGNIFICANT CHANGE UP
CREAT SERPL-MCNC: 0.71 MG/DL — SIGNIFICANT CHANGE UP (ref 0.5–1.3)
DIFF PNL FLD: NEGATIVE — SIGNIFICANT CHANGE UP
EOSINOPHIL # BLD AUTO: 0.09 K/UL — SIGNIFICANT CHANGE UP (ref 0–0.5)
EOSINOPHIL NFR BLD AUTO: 1 % — SIGNIFICANT CHANGE UP (ref 0–6)
GLUCOSE SERPL-MCNC: 85 MG/DL — SIGNIFICANT CHANGE UP (ref 70–99)
GLUCOSE UR QL: NEGATIVE — SIGNIFICANT CHANGE UP
HCG SERPL-ACNC: SIGNIFICANT CHANGE UP MIU/ML
HCT VFR BLD CALC: 43.2 % — SIGNIFICANT CHANGE UP (ref 34.5–45)
HGB BLD-MCNC: 14.2 G/DL — SIGNIFICANT CHANGE UP (ref 11.5–15.5)
IANC: 6.09 K/UL — SIGNIFICANT CHANGE UP (ref 1.5–8.5)
IMM GRANULOCYTES NFR BLD AUTO: 0.3 % — SIGNIFICANT CHANGE UP (ref 0–1.5)
INR BLD: 1.07 RATIO — SIGNIFICANT CHANGE UP (ref 0.88–1.16)
KETONES UR-MCNC: NEGATIVE — SIGNIFICANT CHANGE UP
LEUKOCYTE ESTERASE UR-ACNC: NEGATIVE — SIGNIFICANT CHANGE UP
LYMPHOCYTES # BLD AUTO: 2.47 K/UL — SIGNIFICANT CHANGE UP (ref 1–3.3)
LYMPHOCYTES # BLD AUTO: 26.3 % — SIGNIFICANT CHANGE UP (ref 13–44)
MCHC RBC-ENTMCNC: 29.9 PG — SIGNIFICANT CHANGE UP (ref 27–34)
MCHC RBC-ENTMCNC: 32.9 GM/DL — SIGNIFICANT CHANGE UP (ref 32–36)
MCV RBC AUTO: 90.9 FL — SIGNIFICANT CHANGE UP (ref 80–100)
MONOCYTES # BLD AUTO: 0.69 K/UL — SIGNIFICANT CHANGE UP (ref 0–0.9)
MONOCYTES NFR BLD AUTO: 7.3 % — SIGNIFICANT CHANGE UP (ref 2–14)
NEUTROPHILS # BLD AUTO: 6.09 K/UL — SIGNIFICANT CHANGE UP (ref 1.8–7.4)
NEUTROPHILS NFR BLD AUTO: 64.8 % — SIGNIFICANT CHANGE UP (ref 43–77)
NITRITE UR-MCNC: NEGATIVE — SIGNIFICANT CHANGE UP
NRBC # BLD: 0 /100 WBCS — SIGNIFICANT CHANGE UP
NRBC # FLD: 0 K/UL — SIGNIFICANT CHANGE UP
PH UR: 6.5 — SIGNIFICANT CHANGE UP (ref 5–8)
PLATELET # BLD AUTO: 226 K/UL — SIGNIFICANT CHANGE UP (ref 150–400)
POTASSIUM SERPL-MCNC: 3.9 MMOL/L — SIGNIFICANT CHANGE UP (ref 3.5–5.3)
POTASSIUM SERPL-SCNC: 3.9 MMOL/L — SIGNIFICANT CHANGE UP (ref 3.5–5.3)
PROT SERPL-MCNC: 6.8 G/DL — SIGNIFICANT CHANGE UP (ref 6–8.3)
PROT UR-MCNC: NEGATIVE — SIGNIFICANT CHANGE UP
PROTHROM AB SERPL-ACNC: 12.2 SEC — SIGNIFICANT CHANGE UP (ref 10.6–13.6)
RBC # BLD: 4.75 M/UL — SIGNIFICANT CHANGE UP (ref 3.8–5.2)
RBC # FLD: 11.5 % — SIGNIFICANT CHANGE UP (ref 10.3–14.5)
RH IG SCN BLD-IMP: POSITIVE — SIGNIFICANT CHANGE UP
SODIUM SERPL-SCNC: 136 MMOL/L — SIGNIFICANT CHANGE UP (ref 135–145)
SP GR SPEC: 1.02 — SIGNIFICANT CHANGE UP (ref 1–1.05)
UROBILINOGEN FLD QL: ABNORMAL
WBC # BLD: 9.4 K/UL — SIGNIFICANT CHANGE UP (ref 3.8–10.5)
WBC # FLD AUTO: 9.4 K/UL — SIGNIFICANT CHANGE UP (ref 3.8–10.5)

## 2021-12-14 PROCEDURE — 99285 EMERGENCY DEPT VISIT HI MDM: CPT

## 2021-12-14 PROCEDURE — 76817 TRANSVAGINAL US OBSTETRIC: CPT | Mod: 26

## 2021-12-14 RX ORDER — ACETAMINOPHEN 500 MG
650 TABLET ORAL ONCE
Refills: 0 | Status: COMPLETED | OUTPATIENT
Start: 2021-12-14 | End: 2021-12-14

## 2021-12-14 RX ORDER — SODIUM CHLORIDE 9 MG/ML
1000 INJECTION INTRAMUSCULAR; INTRAVENOUS; SUBCUTANEOUS ONCE
Refills: 0 | Status: COMPLETED | OUTPATIENT
Start: 2021-12-14 | End: 2021-12-14

## 2021-12-14 RX ADMIN — SODIUM CHLORIDE 2000 MILLILITER(S): 9 INJECTION INTRAMUSCULAR; INTRAVENOUS; SUBCUTANEOUS at 13:56

## 2021-12-14 RX ADMIN — Medication 650 MILLIGRAM(S): at 13:47

## 2021-12-14 NOTE — ED ADULT NURSE NOTE - OBJECTIVE STATEMENT
Pt received, 5 weeks pregnant c/o abdominal pain, and vaginal bleeding spotting since last night. pt c/o 9/10 pain, pain meds administered as ordered. 20g placed in L arm, labs drawn as ordered. pt is well appearing, with no acute distress noted. Pt bought to ultrasound.

## 2021-12-14 NOTE — ED PROVIDER NOTE - CLINICAL SUMMARY MEDICAL DECISION MAKING FREE TEXT BOX
35 y/o F pmhx of ovarian cysts complains of pelvic pain and vaginal spotting since saturday.-- 5 weeks pregnant LMP . -- will check labs, T&S, US. 37 y/o F pmhx of ovarian cysts complains of pelvic pain and vaginal spotting since saturday.-- 5 weeks pregnant LMP .  , no bleeding on pelvic exam. no adnexal tenderness,-- will check labs, T&S, US. 37 y/o F pmhx of ovarian cysts complains of pelvic pain and vaginal spotting since saturday.-- 5 weeks pregnant LMP .  , no bleeding on pelvic exam. no adnexal tenderness,--r/o ectopic pregnancy vs threatened AB will check labs, T&S, US.

## 2021-12-14 NOTE — ED PROVIDER NOTE - PATIENT PORTAL LINK FT
You can access the FollowMyHealth Patient Portal offered by [facility] by registering at the following website: [email or web address]. By joining [employer]’s FollowMyHealth portal, you will also be able to view your health information using other applications (apps) compatible with our system.

## 2021-12-14 NOTE — ED PROVIDER NOTE - NSFOLLOWUPINSTRUCTIONS_ED_ALL_ED_FT
Advance activity as tolerated.  Continue all previously prescribed medications as directed unless otherwise instructed.  Follow up with your OBGYN 48-72 hours- bring copies of your results.      Return to the ER for worsening or persistent symptoms, worsening bleeding, pain and/or ANY NEW OR CONCERNING SYMPTOMS. If you have issues obtaining follow up, please call: [phone] to obtain a doctor or specialist who takes your insurance in your area.  You may call [phone] to make an appointment with the internal medicine clinic.     Take tylenol 650mg every 6 hours for pain

## 2021-12-14 NOTE — ED PROVIDER NOTE - PROGRESS NOTE DETAILS
Supervising Statement (TORI Gant): I have personally seen and examined this patient.  I have fully participated in the care of this patient. I have reviewed all pertinent clinical information, including history, physical exam, plan and the ACP Fellow's note and agree except as noted. NP Luis: US results: Single intrauterine gestational sac with a yolk sac present. No fetal   pole is yet identified, which may be due to early pregnancy. Continued   follow-up with serial hCG and ultrasound is recommended. Small subchorionic hemorrhage.    Patient will follow up with her OBGYN in the office. stable for discharge

## 2021-12-14 NOTE — ED PROVIDER NOTE - OBJECTIVE STATEMENT
35 y/o F pmhx of ovarian cysts complains of pelvic pain and vaginal spotting since saturday. she states that she is 5 weeks pregnant LMP  A1. she started with cramping in saturday and yesterday started to have light pink spotting mixed with clear discharge. she complains of nausea but no vomiting. patient had beta hcg drawn yesterday which was 19,000 the pain was worsening so she came to the ED. also complains of feeling lightheaded and tired. denies dizziness, chest pain, fever, chills, dysuria, vomiting, diarrhea. normal BM yesterday.

## 2023-01-01 NOTE — ED ADULT TRIAGE NOTE - NSWEIGHTCALCTOOLDRUG_GEN_A_CORE
Per RN caring for dyad, mother declined lactation services at this time.  Writer did encourage RN to notify of any further needs.    Tosin Herrera RN, IBCLC      used

## 2023-02-03 NOTE — ED PROVIDER NOTE - GASTROINTESTINAL [+], MLM
hematachezia for 2 weeks Consent (Nose)/Introductory Paragraph: The rationale for Mohs was explained to the patient and consent was obtained. The risks, benefits and alternatives to therapy were discussed in detail. Specifically, the risks of nasal deformity, changes in the flow of air through the nose, infection, scarring, bleeding, prolonged wound healing, incomplete removal, allergy to anesthesia, nerve injury and recurrence were addressed. Prior to the procedure, the treatment site was clearly identified and confirmed by the patient. All components of Universal Protocol/PAUSE Rule completed.

## 2023-08-24 NOTE — ED ADULT NURSE NOTE - NS ED PATIENT SAFETY CONCERN
[FreeTextEntry1] : 80-year-old male with no significant past medical history who presents for f/u of T3N2, stage IIIb non-small cell lung cancer, with features of adenocarcinoma. His case was presented at thoracic tumor board on 7/27, unresectable.  Given concern for near airway obstruction I recommend he start chemotherapy before starting radiation therapy.  Extensive discussion about risks, benefits, possible side effects and schedule done today with his friend Ailyn present via phone.    Plan for curative intent therapy with carboplatin/paclitaxel weekly concurrent with radiation therapy. The patient verbalized understanding we will start chemotherapy a bit sooner due to delay in radiation. Premedicate with dexamethasone 20 mg 12 hours before and 6 hours before Taxol. Zofran as needed for nausea sent to pharmacy. Plan to start treatment in 1 week. Follow-up with CHRYSTAL Mulligan during tx. Nutrition referral.  New pathology was sent for NGS via [facility]. Currently pending. All questions were answered to patient and friend satisfaction.  8/24/23  onc clinic 79 y/o m with h/o lung cancer her for 1st time tx. labs wnl proceed with carbo/paclitaxel  C1 will monitor weekly labs and f/u x 7 cycles.  continue current meds dexa prior to every chemo tx. RTC next week for tx and f/u.  .Encourage to contact the office for any concerns or worsening symptoms. Pt verbalizes understanding.
No

## 2023-08-28 ENCOUNTER — EMERGENCY (EMERGENCY)
Facility: HOSPITAL | Age: 38
LOS: 1 days | Discharge: HOME | End: 2023-08-28
Attending: EMERGENCY MEDICINE
Payer: COMMERCIAL

## 2023-08-28 VITALS
DIASTOLIC BLOOD PRESSURE: 72 MMHG | SYSTOLIC BLOOD PRESSURE: 114 MMHG | RESPIRATION RATE: 20 BRPM | HEART RATE: 72 BPM | TEMPERATURE: 99 F | OXYGEN SATURATION: 98 %

## 2023-08-28 VITALS
HEART RATE: 86 BPM | DIASTOLIC BLOOD PRESSURE: 80 MMHG | TEMPERATURE: 99 F | HEIGHT: 66 IN | RESPIRATION RATE: 18 BRPM | SYSTOLIC BLOOD PRESSURE: 118 MMHG | OXYGEN SATURATION: 99 % | WEIGHT: 149.91 LBS

## 2023-08-28 DIAGNOSIS — Z98.89 OTHER SPECIFIED POSTPROCEDURAL STATES: Chronic | ICD-10-CM

## 2023-08-28 DIAGNOSIS — Z98.891 HISTORY OF UTERINE SCAR FROM PREVIOUS SURGERY: Chronic | ICD-10-CM

## 2023-08-28 LAB
ALBUMIN SERPL ELPH-MCNC: 4.6 G/DL — SIGNIFICANT CHANGE UP (ref 3.3–5)
ALP SERPL-CCNC: 44 U/L — SIGNIFICANT CHANGE UP (ref 40–120)
ALT FLD-CCNC: 28 U/L — SIGNIFICANT CHANGE UP (ref 10–45)
ANION GAP SERPL CALC-SCNC: 16 MMOL/L — SIGNIFICANT CHANGE UP (ref 5–17)
APPEARANCE UR: ABNORMAL
APTT BLD: 31 SEC — SIGNIFICANT CHANGE UP (ref 24.5–35.6)
AST SERPL-CCNC: 19 U/L — SIGNIFICANT CHANGE UP (ref 10–40)
BACTERIA # UR AUTO: ABNORMAL
BASOPHILS # BLD AUTO: 0.03 K/UL — SIGNIFICANT CHANGE UP (ref 0–0.2)
BASOPHILS NFR BLD AUTO: 0.3 % — SIGNIFICANT CHANGE UP (ref 0–2)
BILIRUB SERPL-MCNC: 0.6 MG/DL — SIGNIFICANT CHANGE UP (ref 0.2–1.2)
BILIRUB UR-MCNC: NEGATIVE — SIGNIFICANT CHANGE UP
BLD GP AB SCN SERPL QL: NEGATIVE — SIGNIFICANT CHANGE UP
BUN SERPL-MCNC: 8 MG/DL — SIGNIFICANT CHANGE UP (ref 7–23)
CALCIUM SERPL-MCNC: 9.6 MG/DL — SIGNIFICANT CHANGE UP (ref 8.4–10.5)
CHLORIDE SERPL-SCNC: 101 MMOL/L — SIGNIFICANT CHANGE UP (ref 96–108)
CO2 SERPL-SCNC: 18 MMOL/L — LOW (ref 22–31)
COLOR SPEC: YELLOW — SIGNIFICANT CHANGE UP
CREAT SERPL-MCNC: 0.62 MG/DL — SIGNIFICANT CHANGE UP (ref 0.5–1.3)
DIFF PNL FLD: NEGATIVE — SIGNIFICANT CHANGE UP
EGFR: 118 ML/MIN/1.73M2 — SIGNIFICANT CHANGE UP
EOSINOPHIL # BLD AUTO: 0.06 K/UL — SIGNIFICANT CHANGE UP (ref 0–0.5)
EOSINOPHIL NFR BLD AUTO: 0.7 % — SIGNIFICANT CHANGE UP (ref 0–6)
EPI CELLS # UR: 5 /HPF — SIGNIFICANT CHANGE UP
GLUCOSE SERPL-MCNC: 79 MG/DL — SIGNIFICANT CHANGE UP (ref 70–99)
GLUCOSE UR QL: NEGATIVE — SIGNIFICANT CHANGE UP
HCG SERPL-ACNC: HIGH MIU/ML
HCT VFR BLD CALC: 40.8 % — SIGNIFICANT CHANGE UP (ref 34.5–45)
HGB BLD-MCNC: 14 G/DL — SIGNIFICANT CHANGE UP (ref 11.5–15.5)
HYALINE CASTS # UR AUTO: 11 /LPF — HIGH (ref 0–2)
IMM GRANULOCYTES NFR BLD AUTO: 0.6 % — SIGNIFICANT CHANGE UP (ref 0–0.9)
INR BLD: 1.07 RATIO — SIGNIFICANT CHANGE UP (ref 0.85–1.18)
KETONES UR-MCNC: SIGNIFICANT CHANGE UP
LEUKOCYTE ESTERASE UR-ACNC: NEGATIVE — SIGNIFICANT CHANGE UP
LYMPHOCYTES # BLD AUTO: 1.67 K/UL — SIGNIFICANT CHANGE UP (ref 1–3.3)
LYMPHOCYTES # BLD AUTO: 19.1 % — SIGNIFICANT CHANGE UP (ref 13–44)
MCHC RBC-ENTMCNC: 30.8 PG — SIGNIFICANT CHANGE UP (ref 27–34)
MCHC RBC-ENTMCNC: 34.3 GM/DL — SIGNIFICANT CHANGE UP (ref 32–36)
MCV RBC AUTO: 89.9 FL — SIGNIFICANT CHANGE UP (ref 80–100)
MONOCYTES # BLD AUTO: 0.57 K/UL — SIGNIFICANT CHANGE UP (ref 0–0.9)
MONOCYTES NFR BLD AUTO: 6.5 % — SIGNIFICANT CHANGE UP (ref 2–14)
NEUTROPHILS # BLD AUTO: 6.35 K/UL — SIGNIFICANT CHANGE UP (ref 1.8–7.4)
NEUTROPHILS NFR BLD AUTO: 72.8 % — SIGNIFICANT CHANGE UP (ref 43–77)
NITRITE UR-MCNC: POSITIVE
NRBC # BLD: 0 /100 WBCS — SIGNIFICANT CHANGE UP (ref 0–0)
PH UR: 6 — SIGNIFICANT CHANGE UP (ref 5–8)
PLATELET # BLD AUTO: 214 K/UL — SIGNIFICANT CHANGE UP (ref 150–400)
POTASSIUM SERPL-MCNC: 3.4 MMOL/L — LOW (ref 3.5–5.3)
POTASSIUM SERPL-SCNC: 3.4 MMOL/L — LOW (ref 3.5–5.3)
PROT SERPL-MCNC: 7.2 G/DL — SIGNIFICANT CHANGE UP (ref 6–8.3)
PROT UR-MCNC: ABNORMAL
PROTHROM AB SERPL-ACNC: 11.2 SEC — SIGNIFICANT CHANGE UP (ref 9.5–13)
RBC # BLD: 4.54 M/UL — SIGNIFICANT CHANGE UP (ref 3.8–5.2)
RBC # FLD: 11.5 % — SIGNIFICANT CHANGE UP (ref 10.3–14.5)
RBC CASTS # UR COMP ASSIST: 1 /HPF — SIGNIFICANT CHANGE UP (ref 0–4)
RH IG SCN BLD-IMP: POSITIVE — SIGNIFICANT CHANGE UP
SODIUM SERPL-SCNC: 135 MMOL/L — SIGNIFICANT CHANGE UP (ref 135–145)
SP GR SPEC: 1.03 — HIGH (ref 1.01–1.02)
UROBILINOGEN FLD QL: ABNORMAL
WBC # BLD: 8.73 K/UL — SIGNIFICANT CHANGE UP (ref 3.8–10.5)
WBC # FLD AUTO: 8.73 K/UL — SIGNIFICANT CHANGE UP (ref 3.8–10.5)
WBC UR QL: 4 /HPF — SIGNIFICANT CHANGE UP (ref 0–5)

## 2023-08-28 PROCEDURE — 99285 EMERGENCY DEPT VISIT HI MDM: CPT | Mod: 25

## 2023-08-28 PROCEDURE — 81001 URINALYSIS AUTO W/SCOPE: CPT

## 2023-08-28 PROCEDURE — 87186 SC STD MICRODIL/AGAR DIL: CPT

## 2023-08-28 PROCEDURE — 85610 PROTHROMBIN TIME: CPT

## 2023-08-28 PROCEDURE — 85025 COMPLETE CBC W/AUTO DIFF WBC: CPT

## 2023-08-28 PROCEDURE — 85730 THROMBOPLASTIN TIME PARTIAL: CPT

## 2023-08-28 PROCEDURE — 76817 TRANSVAGINAL US OBSTETRIC: CPT | Mod: 26

## 2023-08-28 PROCEDURE — 76817 TRANSVAGINAL US OBSTETRIC: CPT

## 2023-08-28 PROCEDURE — 93975 VASCULAR STUDY: CPT

## 2023-08-28 PROCEDURE — 93975 VASCULAR STUDY: CPT | Mod: 26

## 2023-08-28 PROCEDURE — 86900 BLOOD TYPING SEROLOGIC ABO: CPT

## 2023-08-28 PROCEDURE — 86901 BLOOD TYPING SEROLOGIC RH(D): CPT

## 2023-08-28 PROCEDURE — 86850 RBC ANTIBODY SCREEN: CPT

## 2023-08-28 PROCEDURE — 84702 CHORIONIC GONADOTROPIN TEST: CPT

## 2023-08-28 PROCEDURE — 80053 COMPREHEN METABOLIC PANEL: CPT

## 2023-08-28 PROCEDURE — 87086 URINE CULTURE/COLONY COUNT: CPT

## 2023-08-28 PROCEDURE — 99284 EMERGENCY DEPT VISIT MOD MDM: CPT

## 2023-08-28 PROCEDURE — 87077 CULTURE AEROBIC IDENTIFY: CPT

## 2023-08-28 RX ORDER — NITROFURANTOIN MACROCRYSTAL 50 MG
100 CAPSULE ORAL ONCE
Refills: 0 | Status: COMPLETED | OUTPATIENT
Start: 2023-08-28 | End: 2023-08-28

## 2023-08-28 RX ORDER — NITROFURANTOIN MACROCRYSTAL 50 MG
1 CAPSULE ORAL
Qty: 20 | Refills: 0
Start: 2023-08-28 | End: 2023-09-06

## 2023-08-28 RX ADMIN — Medication 100 MILLIGRAM(S): at 20:12

## 2023-08-28 NOTE — ED ADULT NURSE REASSESSMENT NOTE - NS ED NURSE REASSESS COMMENT FT1
Report taken from JOSE E RN. Pt and family introduced to oncoming RN and updated on plan of care. pt is A&OX4, AMbulatory, VSS, pending US results. Call bell in reach, pt and family educated on use. Bed locked and in lowest position. Denies any needs or complaints at this time.

## 2023-08-28 NOTE — ED PROVIDER NOTE - ATTENDING CONTRIBUTION TO CARE
attending Anoop: 37yF  currently 9 weeks pregnant p/w lower abdominal pain with episode of vaginal spotting yesterday, since resolved. +nausea without vomiting. Exam as above. Will obtain labs including type and screen, TVUS, UA, reassess

## 2023-08-28 NOTE — ED ADULT NURSE NOTE - PAIN RATING/NUMBER SCALE (0-10): ACTIVITY
Discontinue Regimen: zyrtec Continue Regimen: TAC bid followed by moisturizer bid Detail Level: Zone 7 (severe pain)

## 2023-08-28 NOTE — ED PROVIDER NOTE - NSFOLLOWUPINSTRUCTIONS_ED_ALL_ED_FT
Stay well hydrated.  Take antibiotic as prescribed. Complete entire course even if you are starting to feel better.  Follow-up with your OBGYN within 1-2 days  Bring a copy of your results with you  Return to an ER for worsening symptoms or any other concerns.

## 2023-08-28 NOTE — ED PROVIDER NOTE - PHYSICAL EXAMINATION
Const: Well-nourished, Well-developed, appearing stated age.  Eyes: no conjunctival injection, and symmetrical lids.  HEENT: Head NCAT, no lesions. Atraumatic external nose and ears. Moist MM.  Neck: Symmetric, trachea midline.   CVS: +S1/S2  RESP: Unlabored respiratory effort. Clear to auscultation bilaterally.  GI:   gravid abdomen, lower abdominal tenderness  MSK: Normocephalic/Atraumatic, Lower Extremities w/o calf tenderness or edema b/l.   Skin: Warm, dry and intact.   Neuro: CNs II-XII grossly intact. Motor & Sensation grossly intact.  Psych: Awake, Alert, & Oriented (AAO) x3. Appropriate mood and affect.

## 2023-08-28 NOTE — ED ADULT NURSE NOTE - OBJECTIVE STATEMENT
37 y.o F BIB self p/w c/o abnormal vaginal discharge and cramping. A+Ox4. 9 weeks pregnant.  A3 L1. LMP 23. Pt states x1 wk ago started having orange vaginal discharge only when wiping, associated w/ intermittent cramping. Last night, pt noticed sudden onset vaginal spotting w/ new onset pelvic pressure. Also endorsing nausea and vomiting, last vomited yesterday. Upon initial assessment states current cramping pain rated 7/10 on pain scale. Also reports SOB/ lower back pain x "couple days ago." LS clear B/L. Denies CP. Denies burning w/ urination, trouble starting stream, malodorous urine, hematuria, bloody stools. No other complaints at this time. Boyfriend at bedside, side rails up, call bell in reach, bed in lowest position.

## 2023-08-28 NOTE — ED PROVIDER NOTE - PROGRESS NOTE DETAILS
attending Anoop: results reviewed with patient and partner at bedside. Plan for dc with macrobid, close OBGYN follow-up and strict return precautions

## 2023-08-28 NOTE — ED ADULT TRIAGE NOTE - CHIEF COMPLAINT QUOTE
since last week orange discharge and cramping now spotting of blood pelvic pressure  9 weeks pregnant

## 2023-08-28 NOTE — ED PROVIDER NOTE - PATIENT PORTAL LINK FT
You can access the FollowMyHealth Patient Portal offered by [facility] by registering at the following website: [email or web address]. By joining [employer]’s FollowMyHealth portal, you will also be able to view your health information using other applications (apps) compatible with our system.

## 2023-08-28 NOTE — ED PROVIDER NOTE - CLINICAL SUMMARY MEDICAL DECISION MAKING FREE TEXT BOX
37  at 9 weeks presents to the ED with 1 week of intermittent lower abdominal pain now cramping in nature.  Patient had an episode of vaginal spotting yesterday that has resolved.  Patient notes that she has some orange color discharge when she wipes after she uses the bathroom.  Has some nausea but no vomiting.   no fevers or chills.  No chest pain,   Positional shortness of breath.  Patient found to repeat outpatient ultrasound.    will get ultrasound to evaluate for pregnancy prior bleeding.  Will get UA to evaluate for UTI

## 2023-08-28 NOTE — ED PROVIDER NOTE - OBJECTIVE STATEMENT
37  at 9 weeks presents to the ED with 1 week of intermittent lower abdominal pain now cramping in nature.  Patient had an episode of vaginal spotting yesterday that has resolved.  Patient notes that she has some orange color discharge when she wipes after she uses the bathroom.  Has some nausea but no vomiting.   no fevers or chills.  No chest pain,   Positional shortness of breath.  Patient found to repeat outpatient ultrasound

## 2023-08-28 NOTE — ED PROVIDER NOTE - RAPID ASSESSMENT
with 2 prior surgical termination and 2 prior missed AB  presenting to the ED for evaluyation of vaginal discharge. patient 9 weeks pregnant reporting ultrasound 2 weeks ago confirming IUP. patient reports orage/red spotting with cramping for about 1 week. patient reporting lower pelvic pressure at this time. no large clots. denies that the discharge is foul smelling. ob Dr. sue ch succes ob/gyn  with 2 prior surgical termination and 2 prior missed AB  presenting to the ED for evaluyation of vaginal discharge. patient 9 weeks pregnant reporting ultrasound 2 weeks ago confirming IUP. patient reports orage/red spotting with cramping for about 1 week. patient reporting lower pelvic pressure at this time. no large clots. denies that the discharge is foul smelling. ob Dr. sue brown ob/gyn    Pt seen as Q-Pa/Triage note by Physician's Assistant. MD immediately available for consultation. Full evaluation to be performed once pt is transferred to Main ED.  Edmar Gallego MD, Facep

## 2023-08-28 NOTE — ED PROVIDER NOTE - HIV OFFER
Hospitalist Progress Note  [facility]     Patient: Helena Luu  : 1968  MRN: [id number]  Code Status: Full Code    Hospital Day: 8   Date of Service: 2022    Subjective:   Patient seen and examined. Intubated and sedated. Undergoing HD. Past Medical History:   Diagnosis Date    Acute ischemic stroke ([facility] [address] 2022    Arthritis     Chronic kidney disease, stage 5, kidney failure (HCC)     Closed fracture of right shoulder girdle, with routine healing, subsequent encounter     DM (diabetes mellitus) type II controlled with renal manifestation ([facility] [address] 1993    Gastroparesis     GERD (gastroesophageal reflux disease)     Hemodialysis patient ([facility] [address]     dialysis on tues, thur, and sat at [facility]    Hypertension     Hypothyroid     Nasal congestion     recent    Neuropathy     Noncompliance with medications     Palliative care patient 10/08/2019    Restless leg syndrome        Past Surgical History:   Procedure Laterality Date    BREAST SURGERY Left     infected milk gland removed    BREAST SURGERY Right 2021    WEDGE EXCISION RIGHT BREAST DUCT WITH LACRIMAL DUCT PROBE, PEC BLOCK performed by Ajnum Lopez MD at [address] [facility]          COLONOSCOPY Left 2020    Dr Diana Everett hepatic flexure-Severe tortuosity with severe spasming, 1 yr recall    DIALYSIS FISTULA CREATION Left 2019    REVISION LEFT UPPER EXTREMITY AV ACCESS WITH LEFT BRACHIAL ARTERY TO LEFT AXILLARY VEIN WITH  INTERPOSITIONAL ARTEGRAFT   performed by Sophia Griffin MD at [address]  2012    [address] Right 2019    INSERTION OF RIGHT INTERNAL JUGULAR HEMODIALYSIS CATHETER performed by Sophia Griffin MD at [address] [facility]  2018    1.  Moderately increased stainable iron identified by special stain in Kupffer cells and occasional hepatocytes.  Negative for evidence of significant portal or lobular inflammation. Negative for evidence of significant fibrosis    ID COLONOSCOPY W/BIOPSY SINGLE/MULTIPLE N/A 10/20/2017    Dr John Ellison prep-Tubular AP (-) dysplasia x 2--3 yr recall    ID EGD TRANSORAL BIOPSY SINGLE/MULTIPLE N/A 2016    Dr [facility] EGD TRANSORAL BIOPSY SINGLE/MULTIPLE N/A 10/20/2017    Dr Victoria Stager (-)   [address] [facility] VASCULAR SURGERY Left 2016    fistula by Dr Volodymyr Ugalde at [address]  10/02/2017    [facility]. Left upper fistulograms/venograms, balloon angioplasty cephalic vein arch 15M59 conquest.    VASCULAR SURGERY  2018    FISTULOGRAM    VASCULAR SURGERY  10/29/2018    [facility]. Left upper fistulograms/venograms    VASCULAR SURGERY  2018    [facility]. Arch aortogram,left upper arteriograms.  VASCULAR SURGERY  2019    [facility]. Removal of tunneled dialyis catheter right internal jugular vein.  VASCULAR SURGERY  2019    [facility]. Left upper extremity fistulogram including venography to the superior vena cava. Balloon angioplasty left subclavian vein with 10mm x 40mm conquest balloon. Balloon angioplasty mid/proximal upper arm cephalic vein with 61BC x 40mm conquest balloon. Venograms after balloon angioplasty. Stent left mid/proximal upper arm cephalic vein stenosis fluency 10mm x 60mm self-expanding covered stent. Balloon     VASCULAR SURGERY  2019    cont angioplasty stent with 10mm x 40mm conquest balloon. Completion venograms left upper extremity.        Family History   Problem Relation Age of Onset    Colon Cancer Mother          at 63    Colon Polyps Mother     Lung Cancer Father          at 79    Colon Polyps Father     Stomach Cancer Sister     Breast Cancer Sister 27    Depression Daughter 25        committed suicide    Liver Cancer Neg Hx     Liver Disease Neg Hx     Esophageal Cancer Neg Hx     Rectal Cancer Neg Hx        Social History     Socioeconomic History    Marital status: Single     Spouse name: Not on file    Number of children: 2    Years of education: Not on file    Highest education level: Not on file   Occupational History    Not on file   Tobacco Use    Smoking status: Current Every Day Smoker     Packs/day: 0.50     Years: 33.00     Pack years: 16.50     Types: Cigarettes    Smokeless tobacco: Never Used    Tobacco comment: NOW at 1/4 PD, started at age 26 and has averaged 2 PPD   Vaping Use    Vaping Use: Never used   Substance and Sexual Activity    Alcohol use: No    Drug use: Not Currently     Types: Marijuana Elfida Casas)    Sexual activity: Not Currently     Partners: Male   Other Topics Concern    Not on file   Social History Narrative    Not on file     Social Determinants of Health     Financial Resource Strain:     Difficulty of Paying Living Expenses: Not on file   Food Insecurity:     Worried About Running Out of Food in the Last Year: Not on file    Kendy of Food in the Last Year: Not on file   Transportation Needs:     Lack of Transportation (Medical): Not on file    Lack of Transportation (Non-Medical):  Not on file   Physical Activity:     Days of Exercise per Week: Not on file    Minutes of Exercise per Session: Not on file   Stress:     Feeling of Stress : Not on file   Social Connections:     Frequency of Communication with Friends and Family: Not on file    Frequency of Social Gatherings with Friends and Family: Not on file    Attends [employer] Services: Not on file    Active Member of [address] [place] [address] or Organizations: Not on file    Attends Club or Organization Meetings: Not on file    Marital Status: Not on file   Intimate Partner Violence:     Fear of Current or Ex-Partner: Not on file    Emotionally Abused: Not on file    Physically Abused: Not on file    Sexually Abused: Not on file   Housing Stability:     Unable to Pay for Housing in the Last Year: Not on file    Number of [place] in the Last Year: Not on file    Unstable Housing in the Last Year: Not on file       Current Facility-Administered Medications   Medication Dose Route Frequency Provider Last Rate Last Admin    aspirin EC tablet 81 mg  81 mg Oral Daily Lavona Homerohead, DO   81 mg at 05/02/22 3016    heparin (porcine) injection 5,000 Units  5,000 Units SubCUTAneous TID Selena Andrade MD   5,000 Units at 05/02/22 0820    insulin glargine (LANTUS) injection vial 5 Units  5 Units SubCUTAneous Nightly Juanis Diamond MD   5 Units at 05/01/22 2148    niCARdipine (CARDENE) 25 mg in dextrose 5 % 250 mL infusion  2.5-15 mg/hr IntraVENous Continuous Selena Andrade MD   Stopped at 05/01/22 1610    dexmedetomidine (PRECEDEX) 400 mcg in sodium chloride 0.9 % 100 mL infusion  0.1-1.5 mcg/kg/hr IntraVENous Continuous Xuan Jeff, DO 8.2 mL/hr at 05/02/22 0839 0.5 mcg/kg/hr at 05/02/22 0839    menthol-zinc oxide (CALMOSEPTINE) 0.44-20.6 % ointment   Topical BID Juanis Diamond MD   Given at 05/02/22 0500    sodium phosphate 10.5 mmol in sodium chloride 0.9 % 250 mL IVPB  0.16 mmol/kg IntraVENous PRN Patience Walter, DO        Or    sodium phosphate 21 mmol in sodium chloride 0.9 % 250 mL IVPB  0.32 mmol/kg IntraVENous PRN Patience Walter, DO        insulin lispro (HUMALOG) injection vial 0-12 Units  0-12 Units SubCUTAneous TID WC Xuan Doles, DO   2 Units at 05/02/22 0820    insulin lispro (HUMALOG) injection vial 0-6 Units  0-6 Units SubCUTAneous Nightly Xuan Doles, DO   1 Units at 05/01/22 2141    vancomycin (VANCOCIN) oral solution 125 mg  125 mg Oral 4 times per day Xuan Doles, DO   125 mg at 05/02/22 9665    hydrALAZINE (APRESOLINE) tablet 100 mg  100 mg Oral 3 times per day Patience Walter, DO   100 mg at 04/28/22 2014    glucagon (rDNA) injection 1 mg  1 mg IntraMUSCular PRN Xuan Doles, DO        dextrose 5 % solution  100 mL/hr IntraVENous PRN Xuan Doles, DO        glucose chewable tablet 16 g  4 tablet Oral PRN Xuan Doles, DO        dextrose bolus (hypoglycemia) 10% 125 mL  125 mL IntraVENous PRN Yadira Yo DO   Stopped at 04/25/22 4354    Or    dextrose bolus (hypoglycemia) 10% 250 mL  250 mL IntraVENous PRN Yadira Yo, DO        sodium chloride flush 0.9 % injection 5-40 mL  5-40 mL IntraVENous 2 times per day Yadira Yo DO   10 mL at 05/02/22 0820    sodium chloride flush 0.9 % injection 5-40 mL  5-40 mL IntraVENous PRN Yadira Yo DO        0.9 % sodium chloride infusion   IntraVENous PRN Yadira Yo DO        LORazepam (ATIVAN) injection 1 mg  1 mg IntraVENous Q5 Min PRN Yadira Yo DO        ondansetron (ZOFRAN-ODT) disintegrating tablet 4 mg  4 mg Oral Q8H PRN Yadira Yo DO        Or    ondansetron [facility]) injection 4 mg  4 mg IntraVENous Q6H PRN Yadira Yo,         polyethylene glycol [facility]) packet 17 g  17 g Oral Daily PRN Yadira Yo DO        acetaminophen (TYLENOL) tablet 650 mg  650 mg Oral Q6H PRN Yadira Yo DO   650 mg at 04/25/22 7324    Or    acetaminophen (TYLENOL) suppository 650 mg  650 mg Rectal Q6H PRN Yadira Yo DO        levETIRAcetam (KEPPRA) 500 mg/100 mL IVPB  500 mg IntraVENous Q12H Yadira Yo DO   Stopped at 05/02/22 0211    chlorhexidine (PERIDEX) 0.12 % solution 15 mL  15 mL Mouth/Throat BID Yadira Yo DO   15 mL at 05/02/22 0820    famotidine (PEPCID) 20 mg in sodium chloride (PF) 10 mL injection  20 mg IntraVENous Daily Yadira Yo DO   20 mg at 05/02/22 5837    magic butt cream   Topical Q4H PRN Yadira Yo DO   Given at 04/25/22 0028    magnesium sulfate 1000 mg in dextrose 5% 100 mL IVPB  1,000 mg IntraVENous PRN Patience Walter, DO        potassium bicarb-citric acid (EFFER-K) effervescent tablet 40 mEq  40 mEq Oral PRN Patience Walter, DO        Or    potassium chloride 10 mEq/100 mL IVPB (Peripheral Line)  10 mEq IntraVENous PRN Wilmon Able, DO Stopped at 04/25/22 0936         niCARdipine Stopped (05/01/22 1610)    dexmedetomidine HCl in NaCl 0.5 mcg/kg/hr (05/02/22 0839)    dextrose      sodium chloride          Objective:   /65   Pulse 68   Temp 97.5 °F (36.4 °C) (Temporal)   Resp 14   Ht 5' 6\" (1.676 m)   Wt 134 lb (60.8 kg)   SpO2 100%   BMI 21.63 kg/m²     General: no acute distress  HEENT: normocephalic, atraumatic  Neck: supple, symmetrical, trachea midline   Lungs: clear to auscultation bilaterally   Cardiovascular: s1 and s2 normal  Abdomen: soft, positive bowel sounds  Extremities: no cyanosis   Neuro: intubated and sedated    Recent Labs     04/30/22 0153 05/01/22 0226 05/02/22 0318   WBC 7.8 6.1 5.7   RBC 3.40* 3.32* 3.09*   HGB 9.7* 9.4* 8.9*   HCT 31.5* 30.3* 30.2*   MCV 92.6 91.3 97.7   MCH 28.5 28.3 28.8   MCHC 30.8* 31.0* 29.5*    154 164     Recent Labs     04/30/22 0153 05/01/22 0226 05/02/22 0318   * 132* 134*   K 3.8 3.8 4.2   ANIONGAP 21* 14 17   CL 88* 92* 94*   CO2 18* 26 23   BUN 26* 17 32*   CREATININE 3.3* 2.4* 3.5*   GLUCOSE 264* 156* 116*   CALCIUM 8.9 8.6 8.6     Recent Labs     04/30/22 0153 05/01/22 0226 05/02/22 0318   MG 2.4 2.2 2.6   PHOS 4.1 2.6 3.6     Recent Labs     04/30/22 0153 05/01/22 0226 05/02/22 0318   AST 13 13 20   ALT 7 6 7   BILITOT 0.4 0.3 <0.2   ALKPHOS 106* 95 101     No results for input(s): PH, PO2, PCO2, HCO3, BE, O2SAT in the last 72 hours. No results for input(s): TROPONINI in the last 72 hours. Recent Labs     05/01/22  1231 05/02/22  0318   INR 1.06 1.13     No results for input(s): LACTA in the last 72 hours.       Intake/Output Summary (Last 24 hours) at 5/2/2022 1136  Last data filed at 5/2/2022 0600  Gross per 24 hour   Intake 1895.34 ml   Output 15 ml   Net 1880.34 ml     Assessment and Plan:   Acute left MCA infarct  Per MRI brain 4/29/2022  Neurology following  Neurochecks  Meds per neurology  Coumadin per neurology since [facility] given risk of hemorrhagic conversion     Seizure  Neurology following  AEDs per neurology  Seizure precautions     Ventilator dependent acute respiratory failure  Secondary to above processes  Titrate minute ventilation for pH 7.35  Follow ABG/CXR  Follow plateau pressure  Failed SBT again 5/1/2022  Repeat today s/p HD     ESRD on HD  Renal following  Undergoing HD     Medical noncompliance       C. difficile infection  Oral vancomycin     Tobacco dependence       HHS  Since resolved  History of IDDM2  Meds on board  Follow Accu-Cheks/electrolytes  HbA1c 9.4  Poor control     DVT prophylaxis  Heparin    Total critical care time: 48 minutes    Rafa Leigh MD   5/2/2022 11:36 AM Opt out

## 2023-08-28 NOTE — ED ADULT NURSE NOTE - NSFALLUNIVINTERV_ED_ALL_ED
Bed/Stretcher in lowest position, wheels locked, appropriate side rails in place/Call bell, personal items and telephone in reach/Instruct patient to call for assistance before getting out of bed/chair/stretcher/Non-slip footwear applied when patient is off stretcher/[place] to call system/Physically safe environment - no spills, clutter or unnecessary equipment/Purposeful proactive rounding/Room/bathroom lighting operational, light cord in reach

## 2023-09-13 ENCOUNTER — APPOINTMENT (OUTPATIENT)
Dept: ANTEPARTUM | Facility: CLINIC | Age: 38
End: 2023-09-13

## 2023-09-19 ENCOUNTER — APPOINTMENT (OUTPATIENT)
Dept: ANTEPARTUM | Facility: CLINIC | Age: 38
End: 2023-09-19

## 2023-09-21 ENCOUNTER — APPOINTMENT (OUTPATIENT)
Dept: ANTEPARTUM | Facility: CLINIC | Age: 38
End: 2023-09-21
Payer: COMMERCIAL

## 2023-09-21 ENCOUNTER — ASOB RESULT (OUTPATIENT)
Age: 38
End: 2023-09-21

## 2023-09-21 PROCEDURE — 76813 OB US NUCHAL MEAS 1 GEST: CPT | Mod: 59

## 2023-09-21 PROCEDURE — 76801 OB US < 14 WKS SINGLE FETUS: CPT

## 2023-10-03 ENCOUNTER — NON-APPOINTMENT (OUTPATIENT)
Age: 38
End: 2023-10-03

## 2023-10-04 ENCOUNTER — EMERGENCY (EMERGENCY)
Facility: HOSPITAL | Age: 38
LOS: 1 days | Discharge: LEFT BEFORE DISCHARGE | End: 2023-10-04
Admitting: EMERGENCY MEDICINE
Payer: COMMERCIAL

## 2023-10-04 VITALS
HEART RATE: 97 BPM | HEIGHT: 66 IN | DIASTOLIC BLOOD PRESSURE: 79 MMHG | SYSTOLIC BLOOD PRESSURE: 117 MMHG | TEMPERATURE: 99 F | OXYGEN SATURATION: 100 % | RESPIRATION RATE: 16 BRPM

## 2023-10-04 DIAGNOSIS — Z98.89 OTHER SPECIFIED POSTPROCEDURAL STATES: Chronic | ICD-10-CM

## 2023-10-04 DIAGNOSIS — Z98.891 HISTORY OF UTERINE SCAR FROM PREVIOUS SURGERY: Chronic | ICD-10-CM

## 2023-10-04 PROCEDURE — L9991: CPT

## 2023-10-04 NOTE — ED ADULT TRIAGE NOTE - CHIEF COMPLAINT QUOTE
Pt c/o generalized abd pain since 3pm yesterday. Pt is 14 weeks pregnant due 3/29. Pt states pain was intermittent and now mostly in the LUQ. pt states she feels a lot of pressure when standing. pt states she hasn't felt the baby move all day.. No complaints of chest pain, headache, nausea, dizziness, vomiting  SOB, fever, chills verbalized.

## 2023-10-05 ENCOUNTER — OUTPATIENT (OUTPATIENT)
Dept: OUTPATIENT SERVICES | Facility: HOSPITAL | Age: 38
LOS: 1 days | Discharge: HOME | End: 2023-10-05
Payer: MEDICAID

## 2023-10-05 ENCOUNTER — EMERGENCY (EMERGENCY)
Facility: HOSPITAL | Age: 38
LOS: 1 days | Discharge: STILL ADMITTED OR ADMINISTRATIVE | End: 2023-10-05
Admitting: EMERGENCY MEDICINE
Payer: COMMERCIAL

## 2023-10-05 ENCOUNTER — APPOINTMENT (OUTPATIENT)
Dept: ANTEPARTUM | Facility: CLINIC | Age: 38
End: 2023-10-05

## 2023-10-05 VITALS
RESPIRATION RATE: 16 BRPM | TEMPERATURE: 98 F | HEART RATE: 90 BPM | SYSTOLIC BLOOD PRESSURE: 111 MMHG | DIASTOLIC BLOOD PRESSURE: 72 MMHG

## 2023-10-05 VITALS
DIASTOLIC BLOOD PRESSURE: 86 MMHG | SYSTOLIC BLOOD PRESSURE: 130 MMHG | HEIGHT: 66 IN | HEART RATE: 101 BPM | RESPIRATION RATE: 16 BRPM | TEMPERATURE: 98 F | OXYGEN SATURATION: 99 %

## 2023-10-05 VITALS — SYSTOLIC BLOOD PRESSURE: 107 MMHG | HEART RATE: 81 BPM | DIASTOLIC BLOOD PRESSURE: 59 MMHG

## 2023-10-05 DIAGNOSIS — Z98.89 OTHER SPECIFIED POSTPROCEDURAL STATES: Chronic | ICD-10-CM

## 2023-10-05 DIAGNOSIS — O26.899 OTHER SPECIFIED PREGNANCY RELATED CONDITIONS, UNSPECIFIED TRIMESTER: ICD-10-CM

## 2023-10-05 DIAGNOSIS — Z98.891 HISTORY OF UTERINE SCAR FROM PREVIOUS SURGERY: Chronic | ICD-10-CM

## 2023-10-05 LAB
APPEARANCE UR: CLEAR — SIGNIFICANT CHANGE UP
B PERT DNA SPEC QL NAA+PROBE: SIGNIFICANT CHANGE UP
B PERT+PARAPERT DNA PNL SPEC NAA+PROBE: SIGNIFICANT CHANGE UP
BACTERIA # UR AUTO: ABNORMAL /HPF
BILIRUB UR-MCNC: NEGATIVE — SIGNIFICANT CHANGE UP
BORDETELLA PARAPERTUSSIS (RAPRVP): SIGNIFICANT CHANGE UP
C PNEUM DNA SPEC QL NAA+PROBE: SIGNIFICANT CHANGE UP
CAST: 0 /LPF — SIGNIFICANT CHANGE UP (ref 0–4)
COLOR SPEC: YELLOW — SIGNIFICANT CHANGE UP
DIFF PNL FLD: NEGATIVE — SIGNIFICANT CHANGE UP
FLUAV SUBTYP SPEC NAA+PROBE: SIGNIFICANT CHANGE UP
FLUBV RNA SPEC QL NAA+PROBE: SIGNIFICANT CHANGE UP
GLUCOSE UR QL: NEGATIVE MG/DL — SIGNIFICANT CHANGE UP
HADV DNA SPEC QL NAA+PROBE: SIGNIFICANT CHANGE UP
HCOV 229E RNA SPEC QL NAA+PROBE: SIGNIFICANT CHANGE UP
HCOV HKU1 RNA SPEC QL NAA+PROBE: SIGNIFICANT CHANGE UP
HCOV NL63 RNA SPEC QL NAA+PROBE: SIGNIFICANT CHANGE UP
HCOV OC43 RNA SPEC QL NAA+PROBE: SIGNIFICANT CHANGE UP
HMPV RNA SPEC QL NAA+PROBE: SIGNIFICANT CHANGE UP
HPIV1 RNA SPEC QL NAA+PROBE: SIGNIFICANT CHANGE UP
HPIV2 RNA SPEC QL NAA+PROBE: SIGNIFICANT CHANGE UP
HPIV3 RNA SPEC QL NAA+PROBE: SIGNIFICANT CHANGE UP
HPIV4 RNA SPEC QL NAA+PROBE: SIGNIFICANT CHANGE UP
KETONES UR-MCNC: 15 MG/DL
LEUKOCYTE ESTERASE UR-ACNC: NEGATIVE — SIGNIFICANT CHANGE UP
M PNEUMO DNA SPEC QL NAA+PROBE: SIGNIFICANT CHANGE UP
NITRITE UR-MCNC: POSITIVE
PH UR: 6 — SIGNIFICANT CHANGE UP (ref 5–8)
PROT UR-MCNC: NEGATIVE MG/DL — SIGNIFICANT CHANGE UP
RAPID RVP RESULT: DETECTED
RBC CASTS # UR COMP ASSIST: 0 /HPF — SIGNIFICANT CHANGE UP (ref 0–4)
RSV RNA SPEC QL NAA+PROBE: SIGNIFICANT CHANGE UP
RV+EV RNA SPEC QL NAA+PROBE: DETECTED
SARS-COV-2 RNA SPEC QL NAA+PROBE: SIGNIFICANT CHANGE UP
SP GR SPEC: 1.03 — SIGNIFICANT CHANGE UP (ref 1–1.03)
SQUAMOUS # UR AUTO: 2 /HPF — SIGNIFICANT CHANGE UP (ref 0–5)
UROBILINOGEN FLD QL: 0.2 MG/DL — SIGNIFICANT CHANGE UP (ref 0.2–1)
WBC UR QL: 2 /HPF — SIGNIFICANT CHANGE UP (ref 0–5)

## 2023-10-05 PROCEDURE — 99221 1ST HOSP IP/OBS SF/LOW 40: CPT

## 2023-10-05 PROCEDURE — L9996: CPT

## 2023-10-05 RX ORDER — ONDANSETRON 8 MG/1
4 TABLET, FILM COATED ORAL ONCE
Refills: 0 | Status: COMPLETED | OUTPATIENT
Start: 2023-10-05 | End: 2023-10-05

## 2023-10-05 RX ORDER — ACETAMINOPHEN 500 MG
1000 TABLET ORAL ONCE
Refills: 0 | Status: COMPLETED | OUTPATIENT
Start: 2023-10-05 | End: 2023-10-05

## 2023-10-05 RX ORDER — ACETAMINOPHEN 500 MG
1000 TABLET ORAL ONCE
Refills: 0 | Status: DISCONTINUED | OUTPATIENT
Start: 2023-10-05 | End: 2023-10-05

## 2023-10-05 RX ORDER — SODIUM CHLORIDE 9 MG/ML
1000 INJECTION, SOLUTION INTRAVENOUS ONCE
Refills: 0 | Status: COMPLETED | OUTPATIENT
Start: 2023-10-05 | End: 2023-10-05

## 2023-10-05 RX ORDER — CEFTRIAXONE 500 MG/1
1000 INJECTION, POWDER, FOR SOLUTION INTRAMUSCULAR; INTRAVENOUS EVERY 24 HOURS
Refills: 0 | Status: COMPLETED | OUTPATIENT
Start: 2023-10-05 | End: 2023-10-05

## 2023-10-05 RX ADMIN — CEFTRIAXONE 100 MILLIGRAM(S): 500 INJECTION, POWDER, FOR SOLUTION INTRAMUSCULAR; INTRAVENOUS at 22:31

## 2023-10-05 RX ADMIN — ONDANSETRON 4 MILLIGRAM(S): 8 TABLET, FILM COATED ORAL at 20:42

## 2023-10-05 RX ADMIN — Medication 400 MILLIGRAM(S): at 20:43

## 2023-10-05 RX ADMIN — SODIUM CHLORIDE 1000 MILLILITER(S): 9 INJECTION, SOLUTION INTRAVENOUS at 20:43

## 2023-10-05 NOTE — OB PROVIDER TRIAGE NOTE - NSOBPROVIDERNOTE_OBGYN_ALL_OB_FT
38 y/o  at 14.6 weeks presents with lower abdominal pain/pressure, headache and nasal congestion x 2 days, r/o PTL   - Cervix closed, no evidence of  labor at this time  - 1 L bolus, tylenol and zofran given  - RVP and UA sent 36 y/o  at 14.6 weeks presents with lower abdominal pain/pressure, headache and nasal congestion x 2 days, r/o PTL   - Cervix closed, no evidence of  labor at this time  - 1 L bolus, tylenol and zofran given  - RVP and UA sent    - UTI on UA, 1 dose of ceftriaxone to be given now, 2 further doses to be given at outpatient OB office as per Dr. Ng  - Urine culture sent   - Patient stable for discharge home with adequate outpatient follow up  - Instructed patient to follow up with OB/GYN within 1 week  - Educated and discussed  labor precautions  - Educated and discussed concerning signs/symptoms to call OB/GYN and return to L&D including but not limited to vaginal bleeding, leakage of fluid, painful contractions, decreased fetal movement, fever/chills, shortness of breath, chest pain, rash, difficulty ambulating, nausea/vomiting/diarrhea, worsening of symptoms  - Patient states she understands and agrees with assessment and plan, all questions answered  - Discussed with Dr. Ng  - Patient discharged at 10:11pm 36 y/o  at 14.6 weeks presents with lower abdominal pain/pressure, headache and nasal congestion x 2 days, r/o PTL   - Cervix closed, no evidence of  labor at this time  - 1 L bolus, tylenol and zofran given  - RVP and UA sent    - UTI on UA, 1 dose of ceftriaxone to be given now, 2 further doses to be given at outpatient OB office as per Dr. Ng  - Urine culture sent   - Patient feels better with pain  - Patient stable for discharge home with adequate outpatient follow up  - Instructed patient to follow up with OB/GYN within 1 week  - Educated and discussed  labor precautions  - Educated and discussed concerning signs/symptoms to call OB/GYN and return to L&D including but not limited to vaginal bleeding, leakage of fluid, painful contractions, decreased fetal movement, fever/chills, shortness of breath, chest pain, rash, difficulty ambulating, nausea/vomiting/diarrhea, worsening of symptoms  - Patient states she understands and agrees with assessment and plan, all questions answered  - Discussed with Dr. Ng  - Patient discharged at 10:46pm 38 y/o  at 14.6 weeks presents with lower abdominal pain/pressure, headache and nasal congestion x 2 days, r/o PTL   - Cervix closed, no evidence of  labor at this time  - 1 L bolus, tylenol and zofran given  - RVP and UA sent    - UTI on UA, 1 dose of ceftriaxone to be given now, 2 further doses to be given at outpatient OB office as per Dr. Ng  - Urine culture sent   - Patient feels better with pain  - Patient stable for discharge home with adequate outpatient follow up  - Instructed patient to follow up with OB/GYN within 1 week  - Educated and discussed  labor precautions  - Educated and discussed concerning signs/symptoms to call OB/GYN and return to L&D including but not limited to vaginal bleeding, leakage of fluid, painful contractions, decreased fetal movement, fever/chills, shortness of breath, chest pain, rash, difficulty ambulating, nausea/vomiting/diarrhea, worsening of symptoms  - Patient states she understands and agrees with assessment and plan, all questions answered  - Discussed with Dr. Ng  - Patient discharged at 11:06pm 36 y/o  at 14.6 weeks presents with lower abdominal pain/pressure, headache and nasal congestion x 2 days, r/o PTL   - Cervix closed, no evidence of  labor at this time  - 1 L bolus, tylenol and zofran given  - RVP and UA sent    - UTI on UA, 1 dose of ceftriaxone to be given now, 2 further doses to be given at outpatient OB office as per Dr. Ng  - Urine culture sent   - Positive for rhinovirus  - Patient feels better with pain  - Patient stable for discharge home with adequate outpatient follow up  - Instructed patient to follow up with OB/GYN tomorrow for next dose of ceftriaxone   - Educated and discussed  precautions  - Educated and discussed concerning signs/symptoms to call OB/GYN and return to L&D including but not limited to vaginal bleeding, leakage of fluid, painful contractions, decreased fetal movement, fever/chills, shortness of breath, chest pain, rash, difficulty ambulating, nausea/vomiting/diarrhea, worsening of symptoms  - Patient states she understands and agrees with assessment and plan, all questions answered  - Discussed with Dr. Ng  - Patient discharged at 11:06pm

## 2023-10-05 NOTE — OB PROVIDER TRIAGE NOTE - NSHPLABSRESULTS_GEN_ALL_CORE
Urinalysis Basic - ( 05 Oct 2023 20:12 )    Color: Yellow / Appearance: Clear / S.026 / pH: x  Gluc: x / Ketone: 15 mg/dL  / Bili: Negative / Urobili: 0.2 mg/dL   Blood: x / Protein: Negative mg/dL / Nitrite: Positive   Leuk Esterase: Negative / RBC: 0 /HPF / WBC 2 /HPF   Sq Epi: x / Non Sq Epi: 2 /HPF / Bacteria: Many /HPF

## 2023-10-05 NOTE — OB PROVIDER TRIAGE NOTE - NS_CHIEFCOMPLAINT_OBGYN_ALL_OB
----- Message from Melida Red MD sent at 7/22/2020  1:06 PM CDT -----  Please let Livia know her beta ignacia slightly, as no pregnancy was seen in the uterus but there was a cyst noted in her adnexa on US this is very concerning for a possible ectopic pregnancy. Is she currently have any abdominal pain or cramping? Is she experiences this she needs to present to the ER immediately.     Would she be able to come in for an appointment to discuss treatment for ectopic pregnancy today?  
Livia Saunders  1998  HOME: [phone]   WORK: N/A   CELL: [phone]         SELF returning phone call to Nidhi.           Please call back.    
Pt advised of lab results and concern with the possibility of an Ectopic pregnancy.  She will check with her employer to see if she is able to leave and come to see Dr Red today to discuss this.    Pt called back and will come to see Dr Red today at 4:30 at [facility] ob clinic.   
Possible Labor/Headache

## 2023-10-05 NOTE — OB PROVIDER TRIAGE NOTE - NSHPPHYSICALEXAM_GEN_ALL_CORE
Vital Signs Last 24 Hrs  T(C): 36.7 (05 Oct 2023 18:01), Max: 37 (04 Oct 2023 21:26)  T(F): 98.06 (05 Oct 2023 18:01), Max: 98.6 (04 Oct 2023 21:26)  HR: 82 (05 Oct 2023 19:39) (76 - 101)  BP: 111/71 (05 Oct 2023 19:39) (109/67 - 130/86)  BP(mean): --  RR: 16 (05 Oct 2023 16:10) (16 - 16)  SpO2: 99% (05 Oct 2023 15:17) (99% - 100%)    A&O x 3  Lungs: breathing comfortably on RA  Abd: gravid, mild pelvic tenderness to palpation   SSE: cervix appears closed  TAS: posterior placenta, good fetal movement, MVP 3.5  TVS: CL 4.6-4.8, no funneling or dynamic changes  Images saved on ASOB

## 2023-10-05 NOTE — OB PROVIDER TRIAGE NOTE - ADDITIONAL INSTRUCTIONS
- Urine culture sent   - Patient stable for discharge home with adequate outpatient follow up  - Instructed patient to follow up with OB/GYN within 1 week  - Educated and discussed  labor precautions  - Educated and discussed concerning signs/symptoms to call OB/GYN and return to L&D including but not limited to vaginal bleeding, leakage of fluid, painful contractions, decreased fetal movement, fever/chills, shortness of breath, chest pain, rash, difficulty ambulating, nausea/vomiting/diarrhea, worsening of symptoms  - Patient states she understands and agrees with assessment and plan, all questions answered - Urine culture sent   - Patient stable for discharge home with adequate outpatient follow up  - Instructed patient to follow up with OB/GYN tomorrow for next dose of ceftriaxone   - Educated and discussed  precautions  - Educated and discussed concerning signs/symptoms to call OB/GYN and return to L&D including but not limited to vaginal bleeding, leakage of fluid, painful contractions, decreased fetal movement, fever/chills, shortness of breath, chest pain, rash, difficulty ambulating, nausea/vomiting/diarrhea, worsening of symptoms  - Patient states she understands and agrees with assessment and plan, all questions answered

## 2023-10-05 NOTE — OB PROVIDER TRIAGE NOTE - HISTORY OF PRESENT ILLNESS
38 y/o  at 14.6 weeks presents with lower abdominal pain/pressure, headache and nasal congestion x 2 days. States abdominal pain comes and goes, worse with movement. Has not taken any medication for pain. Denies leakage of clear fluid, vaginal bleeding, fever, chills, or any sick contacts.  PNC with Dr. Ng

## 2023-10-05 NOTE — ED ADULT TRIAGE NOTE - CHIEF COMPLAINT QUOTE
Pt  about 15 weeks pregnant c/o abd pressure and nausea, reports was seen here last night but left without receiving treatment. OB Dixie Ng, due date . Reports she was called back by supervisor, Gianna. Endorsing white vaginal discharge. Denies vaginal bleeding. L&D made aware.

## 2023-10-06 DIAGNOSIS — N83.209 UNSPECIFIED OVARIAN CYST, UNSPECIFIED SIDE: ICD-10-CM

## 2023-10-06 DIAGNOSIS — O09.292 SUPERVISION OF PREGNANCY WITH OTHER POOR REPRODUCTIVE OR OBSTETRIC HISTORY, SECOND TRIMESTER: ICD-10-CM

## 2023-10-06 DIAGNOSIS — O26.892 OTHER SPECIFIED PREGNANCY RELATED CONDITIONS, SECOND TRIMESTER: ICD-10-CM

## 2023-10-06 DIAGNOSIS — R51.9 HEADACHE, UNSPECIFIED: ICD-10-CM

## 2023-10-06 DIAGNOSIS — O34.219 MATERNAL CARE FOR UNSPECIFIED TYPE SCAR FROM PREVIOUS CESAREAN DELIVERY: ICD-10-CM

## 2023-10-06 DIAGNOSIS — O34.82 MATERNAL CARE FOR OTHER ABNORMALITIES OF PELVIC ORGANS, SECOND TRIMESTER: ICD-10-CM

## 2023-10-06 DIAGNOSIS — Z3A.14 14 WEEKS GESTATION OF PREGNANCY: ICD-10-CM

## 2023-10-06 DIAGNOSIS — O23.42 UNSPECIFIED INFECTION OF URINARY TRACT IN PREGNANCY, SECOND TRIMESTER: ICD-10-CM

## 2023-10-06 DIAGNOSIS — R10.30 LOWER ABDOMINAL PAIN, UNSPECIFIED: ICD-10-CM

## 2023-10-06 DIAGNOSIS — R09.81 NASAL CONGESTION: ICD-10-CM

## 2023-10-06 DIAGNOSIS — O09.522 SUPERVISION OF ELDERLY MULTIGRAVIDA, SECOND TRIMESTER: ICD-10-CM

## 2023-10-12 NOTE — ED PROVIDER NOTE - RIGHT FACE
right frontal sinus pain O-T Advancement Flap Text: The surgical defect and surrounding skin were prepped with Betadine. The choice of the repair was performed because extensive undermining was required, to close a large gap created by lesion removal, and to reduce tension to enhance both functional and cosmetic results. The defect edges were debeveled with a #15 scalpel blade.  Given the size and location of the defect, an O-to-T advancement flap was deemed most appropriate. Using a sterile surgical marker, an appropriate O-to-T advancement flap was drawn incorporating the defect and placing the expected incisions within the relaxed skin tension lines where possible. The area thus outlined was incised deep to adipose tissue with a #15 scalpel blade. The skin margins were undermined to an appropriate distance in all directions. Following this, the designed flap was advanced and carried over into the primary defect and sutured into place. The subcutaneous tissue and dermis were closed with 4-0 Monocryl. Epidermal closure was achieved with 5-0 Polypropylene (running).  During the repair hemostasis was achieved with Pressure. Petrolatum + pressure dressing were applied.

## 2023-11-08 ENCOUNTER — APPOINTMENT (OUTPATIENT)
Dept: ANTEPARTUM | Facility: CLINIC | Age: 38
End: 2023-11-08
Payer: MEDICAID

## 2023-11-08 ENCOUNTER — ASOB RESULT (OUTPATIENT)
Age: 38
End: 2023-11-08

## 2023-11-08 PROCEDURE — 76811 OB US DETAILED SNGL FETUS: CPT

## 2023-11-21 ENCOUNTER — APPOINTMENT (OUTPATIENT)
Dept: ANTEPARTUM | Facility: CLINIC | Age: 38
End: 2023-11-21

## 2023-11-22 ENCOUNTER — OUTPATIENT (OUTPATIENT)
Dept: INPATIENT UNIT | Facility: HOSPITAL | Age: 38
LOS: 1 days | Discharge: HOME | End: 2023-11-22
Payer: MEDICAID

## 2023-11-22 ENCOUNTER — ASOB RESULT (OUTPATIENT)
Age: 38
End: 2023-11-22

## 2023-11-22 ENCOUNTER — EMERGENCY (EMERGENCY)
Facility: HOSPITAL | Age: 38
LOS: 1 days | Discharge: STILL ADMITTED OR ADMINISTRATIVE | End: 2023-11-22
Admitting: EMERGENCY MEDICINE
Payer: COMMERCIAL

## 2023-11-22 ENCOUNTER — APPOINTMENT (OUTPATIENT)
Dept: ANTEPARTUM | Facility: CLINIC | Age: 38
End: 2023-11-22
Payer: MEDICAID

## 2023-11-22 VITALS
TEMPERATURE: 98 F | HEART RATE: 82 BPM | SYSTOLIC BLOOD PRESSURE: 127 MMHG | RESPIRATION RATE: 18 BRPM | DIASTOLIC BLOOD PRESSURE: 80 MMHG

## 2023-11-22 VITALS
DIASTOLIC BLOOD PRESSURE: 76 MMHG | TEMPERATURE: 98 F | SYSTOLIC BLOOD PRESSURE: 152 MMHG | HEART RATE: 93 BPM | HEIGHT: 66 IN | RESPIRATION RATE: 22 BRPM | OXYGEN SATURATION: 100 %

## 2023-11-22 VITALS — HEART RATE: 80 BPM | SYSTOLIC BLOOD PRESSURE: 118 MMHG | DIASTOLIC BLOOD PRESSURE: 77 MMHG

## 2023-11-22 DIAGNOSIS — Z98.891 HISTORY OF UTERINE SCAR FROM PREVIOUS SURGERY: Chronic | ICD-10-CM

## 2023-11-22 DIAGNOSIS — Z98.890 OTHER SPECIFIED POSTPROCEDURAL STATES: Chronic | ICD-10-CM

## 2023-11-22 DIAGNOSIS — Z98.89 OTHER SPECIFIED POSTPROCEDURAL STATES: Chronic | ICD-10-CM

## 2023-11-22 DIAGNOSIS — O26.899 OTHER SPECIFIED PREGNANCY RELATED CONDITIONS, UNSPECIFIED TRIMESTER: ICD-10-CM

## 2023-11-22 DIAGNOSIS — R10.9 UNSPECIFIED ABDOMINAL PAIN: ICD-10-CM

## 2023-11-22 LAB
ALBUMIN SERPL ELPH-MCNC: 3.8 G/DL — SIGNIFICANT CHANGE UP (ref 3.3–5)
ALBUMIN SERPL ELPH-MCNC: 3.8 G/DL — SIGNIFICANT CHANGE UP (ref 3.3–5)
ALP SERPL-CCNC: 50 U/L — SIGNIFICANT CHANGE UP (ref 40–120)
ALP SERPL-CCNC: 50 U/L — SIGNIFICANT CHANGE UP (ref 40–120)
ALT FLD-CCNC: 9 U/L — SIGNIFICANT CHANGE UP (ref 4–33)
ALT FLD-CCNC: 9 U/L — SIGNIFICANT CHANGE UP (ref 4–33)
ANION GAP SERPL CALC-SCNC: 10 MMOL/L — SIGNIFICANT CHANGE UP (ref 7–14)
ANION GAP SERPL CALC-SCNC: 10 MMOL/L — SIGNIFICANT CHANGE UP (ref 7–14)
APPEARANCE UR: CLEAR — SIGNIFICANT CHANGE UP
APPEARANCE UR: CLEAR — SIGNIFICANT CHANGE UP
AST SERPL-CCNC: 9 U/L — SIGNIFICANT CHANGE UP (ref 4–32)
AST SERPL-CCNC: 9 U/L — SIGNIFICANT CHANGE UP (ref 4–32)
BACTERIA # UR AUTO: NEGATIVE /HPF — SIGNIFICANT CHANGE UP
BACTERIA # UR AUTO: NEGATIVE /HPF — SIGNIFICANT CHANGE UP
BASOPHILS # BLD AUTO: 0.03 K/UL — SIGNIFICANT CHANGE UP (ref 0–0.2)
BASOPHILS # BLD AUTO: 0.03 K/UL — SIGNIFICANT CHANGE UP (ref 0–0.2)
BASOPHILS NFR BLD AUTO: 0.3 % — SIGNIFICANT CHANGE UP (ref 0–2)
BASOPHILS NFR BLD AUTO: 0.3 % — SIGNIFICANT CHANGE UP (ref 0–2)
BILIRUB SERPL-MCNC: 0.2 MG/DL — SIGNIFICANT CHANGE UP (ref 0.2–1.2)
BILIRUB SERPL-MCNC: 0.2 MG/DL — SIGNIFICANT CHANGE UP (ref 0.2–1.2)
BILIRUB UR-MCNC: NEGATIVE — SIGNIFICANT CHANGE UP
BILIRUB UR-MCNC: NEGATIVE — SIGNIFICANT CHANGE UP
BUN SERPL-MCNC: 8 MG/DL — SIGNIFICANT CHANGE UP (ref 7–23)
BUN SERPL-MCNC: 8 MG/DL — SIGNIFICANT CHANGE UP (ref 7–23)
CALCIUM SERPL-MCNC: 8.9 MG/DL — SIGNIFICANT CHANGE UP (ref 8.4–10.5)
CALCIUM SERPL-MCNC: 8.9 MG/DL — SIGNIFICANT CHANGE UP (ref 8.4–10.5)
CAST: 2 /LPF — SIGNIFICANT CHANGE UP (ref 0–4)
CAST: 2 /LPF — SIGNIFICANT CHANGE UP (ref 0–4)
CHLORIDE SERPL-SCNC: 106 MMOL/L — SIGNIFICANT CHANGE UP (ref 98–107)
CHLORIDE SERPL-SCNC: 106 MMOL/L — SIGNIFICANT CHANGE UP (ref 98–107)
CO2 SERPL-SCNC: 21 MMOL/L — LOW (ref 22–31)
CO2 SERPL-SCNC: 21 MMOL/L — LOW (ref 22–31)
COLOR SPEC: YELLOW — SIGNIFICANT CHANGE UP
COLOR SPEC: YELLOW — SIGNIFICANT CHANGE UP
CREAT SERPL-MCNC: 0.75 MG/DL — SIGNIFICANT CHANGE UP (ref 0.5–1.3)
CREAT SERPL-MCNC: 0.75 MG/DL — SIGNIFICANT CHANGE UP (ref 0.5–1.3)
DIFF PNL FLD: ABNORMAL
DIFF PNL FLD: ABNORMAL
EGFR: 105 ML/MIN/1.73M2 — SIGNIFICANT CHANGE UP
EGFR: 105 ML/MIN/1.73M2 — SIGNIFICANT CHANGE UP
EOSINOPHIL # BLD AUTO: 0.08 K/UL — SIGNIFICANT CHANGE UP (ref 0–0.5)
EOSINOPHIL # BLD AUTO: 0.08 K/UL — SIGNIFICANT CHANGE UP (ref 0–0.5)
EOSINOPHIL NFR BLD AUTO: 0.7 % — SIGNIFICANT CHANGE UP (ref 0–6)
EOSINOPHIL NFR BLD AUTO: 0.7 % — SIGNIFICANT CHANGE UP (ref 0–6)
GLUCOSE SERPL-MCNC: 95 MG/DL — SIGNIFICANT CHANGE UP (ref 70–99)
GLUCOSE SERPL-MCNC: 95 MG/DL — SIGNIFICANT CHANGE UP (ref 70–99)
GLUCOSE UR QL: NEGATIVE MG/DL — SIGNIFICANT CHANGE UP
GLUCOSE UR QL: NEGATIVE MG/DL — SIGNIFICANT CHANGE UP
HCT VFR BLD CALC: 34.1 % — LOW (ref 34.5–45)
HCT VFR BLD CALC: 34.1 % — LOW (ref 34.5–45)
HGB BLD-MCNC: 11.5 G/DL — SIGNIFICANT CHANGE UP (ref 11.5–15.5)
HGB BLD-MCNC: 11.5 G/DL — SIGNIFICANT CHANGE UP (ref 11.5–15.5)
IANC: 8.06 K/UL — HIGH (ref 1.8–7.4)
IANC: 8.06 K/UL — HIGH (ref 1.8–7.4)
IMM GRANULOCYTES NFR BLD AUTO: 1.5 % — HIGH (ref 0–0.9)
IMM GRANULOCYTES NFR BLD AUTO: 1.5 % — HIGH (ref 0–0.9)
KETONES UR-MCNC: NEGATIVE MG/DL — SIGNIFICANT CHANGE UP
KETONES UR-MCNC: NEGATIVE MG/DL — SIGNIFICANT CHANGE UP
LEUKOCYTE ESTERASE UR-ACNC: NEGATIVE — SIGNIFICANT CHANGE UP
LEUKOCYTE ESTERASE UR-ACNC: NEGATIVE — SIGNIFICANT CHANGE UP
LYMPHOCYTES # BLD AUTO: 1.72 K/UL — SIGNIFICANT CHANGE UP (ref 1–3.3)
LYMPHOCYTES # BLD AUTO: 1.72 K/UL — SIGNIFICANT CHANGE UP (ref 1–3.3)
LYMPHOCYTES # BLD AUTO: 16 % — SIGNIFICANT CHANGE UP (ref 13–44)
LYMPHOCYTES # BLD AUTO: 16 % — SIGNIFICANT CHANGE UP (ref 13–44)
MCHC RBC-ENTMCNC: 29.9 PG — SIGNIFICANT CHANGE UP (ref 27–34)
MCHC RBC-ENTMCNC: 29.9 PG — SIGNIFICANT CHANGE UP (ref 27–34)
MCHC RBC-ENTMCNC: 33.7 GM/DL — SIGNIFICANT CHANGE UP (ref 32–36)
MCHC RBC-ENTMCNC: 33.7 GM/DL — SIGNIFICANT CHANGE UP (ref 32–36)
MCV RBC AUTO: 88.6 FL — SIGNIFICANT CHANGE UP (ref 80–100)
MCV RBC AUTO: 88.6 FL — SIGNIFICANT CHANGE UP (ref 80–100)
MONOCYTES # BLD AUTO: 0.71 K/UL — SIGNIFICANT CHANGE UP (ref 0–0.9)
MONOCYTES # BLD AUTO: 0.71 K/UL — SIGNIFICANT CHANGE UP (ref 0–0.9)
MONOCYTES NFR BLD AUTO: 6.6 % — SIGNIFICANT CHANGE UP (ref 2–14)
MONOCYTES NFR BLD AUTO: 6.6 % — SIGNIFICANT CHANGE UP (ref 2–14)
NEUTROPHILS # BLD AUTO: 8.06 K/UL — HIGH (ref 1.8–7.4)
NEUTROPHILS # BLD AUTO: 8.06 K/UL — HIGH (ref 1.8–7.4)
NEUTROPHILS NFR BLD AUTO: 74.9 % — SIGNIFICANT CHANGE UP (ref 43–77)
NEUTROPHILS NFR BLD AUTO: 74.9 % — SIGNIFICANT CHANGE UP (ref 43–77)
NITRITE UR-MCNC: NEGATIVE — SIGNIFICANT CHANGE UP
NITRITE UR-MCNC: NEGATIVE — SIGNIFICANT CHANGE UP
NRBC # BLD: 0 /100 WBCS — SIGNIFICANT CHANGE UP (ref 0–0)
NRBC # BLD: 0 /100 WBCS — SIGNIFICANT CHANGE UP (ref 0–0)
NRBC # FLD: 0 K/UL — SIGNIFICANT CHANGE UP (ref 0–0)
NRBC # FLD: 0 K/UL — SIGNIFICANT CHANGE UP (ref 0–0)
PH UR: 8 — SIGNIFICANT CHANGE UP (ref 5–8)
PH UR: 8 — SIGNIFICANT CHANGE UP (ref 5–8)
PLATELET # BLD AUTO: 190 K/UL — SIGNIFICANT CHANGE UP (ref 150–400)
PLATELET # BLD AUTO: 190 K/UL — SIGNIFICANT CHANGE UP (ref 150–400)
POTASSIUM SERPL-MCNC: 3.9 MMOL/L — SIGNIFICANT CHANGE UP (ref 3.5–5.3)
POTASSIUM SERPL-MCNC: 3.9 MMOL/L — SIGNIFICANT CHANGE UP (ref 3.5–5.3)
POTASSIUM SERPL-SCNC: 3.9 MMOL/L — SIGNIFICANT CHANGE UP (ref 3.5–5.3)
POTASSIUM SERPL-SCNC: 3.9 MMOL/L — SIGNIFICANT CHANGE UP (ref 3.5–5.3)
PROT SERPL-MCNC: 6.5 G/DL — SIGNIFICANT CHANGE UP (ref 6–8.3)
PROT SERPL-MCNC: 6.5 G/DL — SIGNIFICANT CHANGE UP (ref 6–8.3)
PROT UR-MCNC: SIGNIFICANT CHANGE UP MG/DL
PROT UR-MCNC: SIGNIFICANT CHANGE UP MG/DL
RBC # BLD: 3.85 M/UL — SIGNIFICANT CHANGE UP (ref 3.8–5.2)
RBC # BLD: 3.85 M/UL — SIGNIFICANT CHANGE UP (ref 3.8–5.2)
RBC # FLD: 12.4 % — SIGNIFICANT CHANGE UP (ref 10.3–14.5)
RBC # FLD: 12.4 % — SIGNIFICANT CHANGE UP (ref 10.3–14.5)
RBC CASTS # UR COMP ASSIST: 1 /HPF — SIGNIFICANT CHANGE UP (ref 0–4)
RBC CASTS # UR COMP ASSIST: 1 /HPF — SIGNIFICANT CHANGE UP (ref 0–4)
SODIUM SERPL-SCNC: 137 MMOL/L — SIGNIFICANT CHANGE UP (ref 135–145)
SODIUM SERPL-SCNC: 137 MMOL/L — SIGNIFICANT CHANGE UP (ref 135–145)
SP GR SPEC: 1.01 — SIGNIFICANT CHANGE UP (ref 1–1.03)
SP GR SPEC: 1.01 — SIGNIFICANT CHANGE UP (ref 1–1.03)
SQUAMOUS # UR AUTO: 4 /HPF — SIGNIFICANT CHANGE UP (ref 0–5)
SQUAMOUS # UR AUTO: 4 /HPF — SIGNIFICANT CHANGE UP (ref 0–5)
UROBILINOGEN FLD QL: 1 MG/DL — SIGNIFICANT CHANGE UP (ref 0.2–1)
UROBILINOGEN FLD QL: 1 MG/DL — SIGNIFICANT CHANGE UP (ref 0.2–1)
WBC # BLD: 10.76 K/UL — HIGH (ref 3.8–10.5)
WBC # BLD: 10.76 K/UL — HIGH (ref 3.8–10.5)
WBC # FLD AUTO: 10.76 K/UL — HIGH (ref 3.8–10.5)
WBC # FLD AUTO: 10.76 K/UL — HIGH (ref 3.8–10.5)
WBC UR QL: 1 /HPF — SIGNIFICANT CHANGE UP (ref 0–5)
WBC UR QL: 1 /HPF — SIGNIFICANT CHANGE UP (ref 0–5)

## 2023-11-22 PROCEDURE — 76815 OB US LIMITED FETUS(S): CPT | Mod: 26

## 2023-11-22 PROCEDURE — L9996: CPT

## 2023-11-22 PROCEDURE — 76856 US EXAM PELVIC COMPLETE: CPT | Mod: 26

## 2023-11-22 PROCEDURE — 99221 1ST HOSP IP/OBS SF/LOW 40: CPT

## 2023-11-22 PROCEDURE — 76817 TRANSVAGINAL US OBSTETRIC: CPT | Mod: 26

## 2023-11-22 RX ORDER — SIMETHICONE 80 MG/1
80 TABLET, CHEWABLE ORAL ONCE
Refills: 0 | Status: COMPLETED | OUTPATIENT
Start: 2023-11-22 | End: 2023-11-22

## 2023-11-22 RX ORDER — SODIUM CHLORIDE 9 MG/ML
3 INJECTION INTRAMUSCULAR; INTRAVENOUS; SUBCUTANEOUS EVERY 8 HOURS
Refills: 0 | Status: DISCONTINUED | OUTPATIENT
Start: 2023-11-22 | End: 2023-12-07

## 2023-11-22 RX ADMIN — SIMETHICONE 80 MILLIGRAM(S): 80 TABLET, CHEWABLE ORAL at 15:32

## 2023-11-22 RX ADMIN — SODIUM CHLORIDE 3 MILLILITER(S): 9 INJECTION INTRAMUSCULAR; INTRAVENOUS; SUBCUTANEOUS at 13:25

## 2023-11-22 NOTE — OB PROVIDER TRIAGE NOTE - HISTORY OF PRESENT ILLNESS
37 y/o  @ 21.5 wks gestation presents via EMS with c/o sudden onset of LLQ abdominal pain that has been constant , states had pain yesterday and " thought it might have been gas " denies any LOF or VB pt states tolerating po fluids and solids denies any fever or chills denies any n/v/d ap care uncomplicated thus far  39 y/o  @ 21.5 wks gestation presents via EMS with c/o sudden onset of LLQ abdominal pain 10/10 that has been constant , states had pain yesterday and " thought it might have been gas " denies any LOF or VB pt states tolerating po fluids and solids denies any fever or chills denies any n/v/d ap care uncomplicated thus far

## 2023-11-22 NOTE — OB PROVIDER TRIAGE NOTE - NSWIC_OBGYN_ALL_OB
Post-Anesthesia Evaluation and Assessment    Patient: Ivet Rousseau MRN: [id number]  SSN: [id number]    YOB: 1937  Age: 79 y.o. Sex: male      Data from PACU flowsheet    Cardiovascular Function/Vital Signs  Visit Vitals    /70    Pulse 65    Temp 36.6 °C (97.9 °F)    Resp 16    Ht 5' 6\" (1.676 m)    Wt 113.4 kg (250 lb)    SpO2 93%    BMI 40.35 kg/m2       Patient is status post general anesthesia for Procedure(s):  left  URETEROSCOPY,laser lithotripsy,left stent change. Nausea/Vomiting: controlled    Postoperative hydration reviewed and adequate. Pain:  Pain Scale 1: Numeric (0 - 10) (09/06/17 1536)  Pain Intensity 1: 0 (09/06/17 1536)   Managed      Mental Status and Level of Consciousness: Alert and oriented     Pulmonary Status:   O2 Device: Nasal cannula (09/06/17 1536)   Adequate oxygenation and airway patent    Complications related to anesthesia: None    Post-anesthesia assessment completed.  No concerns    Signed By: Joan Perez MD     September 6, 2017 No

## 2023-11-22 NOTE — OB PROVIDER TRIAGE NOTE - NSOBPROVIDERNOTE_OBGYN_ALL_OB_FT
pt returned from ultrasound @ 1515   pt states pain is better , but " I still have some discomfort "   1530: Simethicone 80 mg po given x's 1  1545: plan of care d/w dr Ng   will reevaluate Simethicone @ 1630  will continue to monitor pt returned from ultrasound @ 1515   pt states pain is better , but " I still have some discomfort "   1530: Simethicone 80 mg po given x's 1  1545: plan of care d/w dr Ng   will reevaluate Simethicone @ 1630  will continue to monitor    1620:  Dr Ng in attendance and evaluated patient   pt states pain is " better "   likely with flatulence / possible renal colic   urine c&s  will follow up with result of urine c&s  pt to be discharged home   PTL precautions d/w pt  increase fluid intake  follow up with dr ng in 1 week   w/v discharge instructions given  discharged home      discharged @ 1631

## 2023-11-22 NOTE — OB RN TRIAGE NOTE - NSICDXPASTSURGICALHX_GEN_ALL_CORE_FT
PAST SURGICAL HISTORY:  S/P      Status post dilation and curettage 2008     PAST SURGICAL HISTORY:  H/O abdominoplasty     S/P      Status post dilation and curettage

## 2023-11-22 NOTE — OB PROVIDER TRIAGE NOTE - NSHPPHYSICALEXAM_GEN_ALL_CORE
abdomen: soft, nt on palp  LLQ tender upon palpation , no guarding no rebound tenderness   SSE: cervix appears closed and posterior   no LOF no VB noted   sono reports in ASOB  sono images in ASOB   TVS: 4.28-4.35 cm no dynamic changes   TAS: posterior placenta FH- 143 bpm MVP: 7.13   T(C): 36.8 (11-22-23 @ 13:26), Max: 36.8 (11-22-23 @ 13:20)  HR: 80 (11-22-23 @ 14:10) (80 - 93)  BP: 118/77 (11-22-23 @ 14:10) (114/72 - 152/76)  RR: 18 (11-22-23 @ 13:26) (18 - 22)  SpO2: 100% (11-22-23 @ 13:04) (100% - 100%) abdomen: soft, nt on palp  LLQ tender upon palpation , no guarding no rebound tenderness   SSE: cervix appears closed and posterior   no LOF no VB noted   sono reports in ASOB  sono images in ASOB   TVS: 4.28-4.35 cm no dynamic changes   TAS: posterior placenta FH- 143 bpm MVP: 7.13   T(C): 36.8 (11-22-23 @ 13:26), Max: 36.8 (11-22-23 @ 13:20)  HR: 80 (11-22-23 @ 14:10) (80 - 93)  BP: 118/77 (11-22-23 @ 14:10) (114/72 - 152/76)  RR: 18 (11-22-23 @ 13:26) (18 - 22)  SpO2: 100% (11-22-23 @ 13:04) (100% - 100%)      addendum @ 1550 :  toco: no uterine contractions noted

## 2023-11-22 NOTE — OB PROVIDER TRIAGE NOTE - NS_OBGYNHISTORY_OBGYN_ALL_OB_FT
2014 c/s [facility] 7-12 m  sab x 1 D+C OB HX:   2008 SAB x's 1   2014 primary  breech presentation 7#12   GYN HX:   hx ovarian cysts   uterine myoma x's 1

## 2023-11-22 NOTE — OB PROVIDER TRIAGE NOTE - NSHPLABSRESULTS_GEN_ALL_CORE
Urinalysis Basic - ( 2023 13:45 )    Color: Yellow / Appearance: Clear / S.010 / pH: x  Gluc: 95 mg/dL / Ketone: Negative mg/dL  / Bili: Negative / Urobili: 1.0 mg/dL   Blood: x / Protein: Trace mg/dL / Nitrite: Negative   Leuk Esterase: Negative / RBC: 1 /HPF / WBC 1 /HPF   Sq Epi: x / Non Sq Epi: 4 /HPF / Bacteria: Negative /HPF    CBC Full  -  ( 2023 13:45 )  WBC Count : 10.76 K/uL  RBC Count : 3.85 M/uL  Hemoglobin : 11.5 g/dL  Hematocrit : 34.1 %  Platelet Count - Automated : 190 K/uL  Mean Cell Volume : 88.6 fL  Mean Cell Hemoglobin : 29.9 pg  Mean Cell Hemoglobin Concentration : 33.7 gm/dL  Auto Neutrophil # : 8.06 K/uL  Auto Lymphocyte # : 1.72 K/uL  Auto Monocyte # : 0.71 K/uL  Auto Eosinophil # : 0.08 K/uL  Auto Basophil # : 0.03 K/uL  Auto Neutrophil % : 74.9 %  Auto Lymphocyte % : 16.0 %  Auto Monocyte % : 6.6 %  Auto Eosinophil % : 0.7 %  Auto Basophil % : 0.3 %        137  |  106  |  8   ----------------------------<  95  3.9   |  21<L>  |  0.75    Ca    8.9      2023 13:45    TPro  6.5  /  Alb  3.8  /  TBili  0.2  /  DBili  x   /  AST  9   /  ALT  9   /  AlkPhos  50  -    US pelvis complete Urinalysis Basic - ( 2023 13:45 )    Color: Yellow / Appearance: Clear / S.010 / pH: x  Gluc: 95 mg/dL / Ketone: Negative mg/dL  / Bili: Negative / Urobili: 1.0 mg/dL   Blood: x / Protein: Trace mg/dL / Nitrite: Negative   Leuk Esterase: Negative / RBC: 1 /HPF / WBC 1 /HPF   Sq Epi: x / Non Sq Epi: 4 /HPF / Bacteria: Negative /HPF    CBC Full  -  ( 2023 13:45 )  WBC Count : 10.76 K/uL  RBC Count : 3.85 M/uL  Hemoglobin : 11.5 g/dL  Hematocrit : 34.1 %  Platelet Count - Automated : 190 K/uL  Mean Cell Volume : 88.6 fL  Mean Cell Hemoglobin : 29.9 pg  Mean Cell Hemoglobin Concentration : 33.7 gm/dL  Auto Neutrophil # : 8.06 K/uL  Auto Lymphocyte # : 1.72 K/uL  Auto Monocyte # : 0.71 K/uL  Auto Eosinophil # : 0.08 K/uL  Auto Basophil # : 0.03 K/uL  Auto Neutrophil % : 74.9 %  Auto Lymphocyte % : 16.0 %  Auto Monocyte % : 6.6 %  Auto Eosinophil % : 0.7 %  Auto Basophil % : 0.3 %        137  |  106  |  8   ----------------------------<  95  3.9   |  21<L>  |  0.75    Ca    8.9      2023 13:45    TPro  6.5  /  Alb  3.8  /  TBili  0.2  /  DBili  x   /  AST  9   /  ALT  9   /  AlkPhos  50      US pelvis complete    US pelvis complete:   impression:  non visualization of the ovaries

## 2023-11-22 NOTE — OB PROVIDER TRIAGE NOTE - ADDITIONAL INSTRUCTIONS
1620:  Dr Ng in attendance and evaluated patient   pt states pain is " better "   likely with flatulence / possible renal colic   urine c&s  will follow up with result of urine c&s  pt to be discharged home   PTL precautions d/w pt  increase fluid intake  follow up with dr ng in 1 week   w/v discharge instructions given  discharged home      discharged @ 2435

## 2023-11-22 NOTE — OB PROVIDER TRIAGE NOTE - NSICDXPASTMEDICALHX_GEN_ALL_CORE_FT
PAST MEDICAL HISTORY:  Anxiety     Breech presentation     Fibroid     Ovarian cyst      PAST MEDICAL HISTORY:  Breech presentation     Fibroid     Ovarian cyst

## 2023-11-22 NOTE — ED ADULT TRIAGE NOTE - CHIEF COMPLAINT QUOTE
Pt 22 weeks pregnant c/o abdominal pain worsening over the last few hours. - one . Appears uncomfortable. Due date is in March. Denies vaginal bleeding.  Spoke with PA in triage for L and D- pt to come upstairs.

## 2023-11-23 LAB
CULTURE RESULTS: SIGNIFICANT CHANGE UP
CULTURE RESULTS: SIGNIFICANT CHANGE UP
SPECIMEN SOURCE: SIGNIFICANT CHANGE UP
SPECIMEN SOURCE: SIGNIFICANT CHANGE UP

## 2023-11-24 DIAGNOSIS — R14.3 FLATULENCE: ICD-10-CM

## 2023-11-24 DIAGNOSIS — N83.209 UNSPECIFIED OVARIAN CYST, UNSPECIFIED SIDE: ICD-10-CM

## 2023-11-24 DIAGNOSIS — Z87.59 PERSONAL HISTORY OF OTHER COMPLICATIONS OF PREGNANCY, CHILDBIRTH AND THE PUERPERIUM: ICD-10-CM

## 2023-11-24 DIAGNOSIS — O09.522 SUPERVISION OF ELDERLY MULTIGRAVIDA, SECOND TRIMESTER: ICD-10-CM

## 2023-11-24 DIAGNOSIS — O99.891 OTHER SPECIFIED DISEASES AND CONDITIONS COMPLICATING PREGNANCY: ICD-10-CM

## 2023-11-24 DIAGNOSIS — O34.219 MATERNAL CARE FOR UNSPECIFIED TYPE SCAR FROM PREVIOUS CESAREAN DELIVERY: ICD-10-CM

## 2023-11-24 DIAGNOSIS — Z3A.21 21 WEEKS GESTATION OF PREGNANCY: ICD-10-CM

## 2023-11-24 DIAGNOSIS — O26.892 OTHER SPECIFIED PREGNANCY RELATED CONDITIONS, SECOND TRIMESTER: ICD-10-CM

## 2023-11-24 DIAGNOSIS — O09.292 SUPERVISION OF PREGNANCY WITH OTHER POOR REPRODUCTIVE OR OBSTETRIC HISTORY, SECOND TRIMESTER: ICD-10-CM

## 2023-11-24 DIAGNOSIS — D21.9 BENIGN NEOPLASM OF CONNECTIVE AND OTHER SOFT TISSUE, UNSPECIFIED: ICD-10-CM

## 2023-12-06 ENCOUNTER — TRANSCRIPTION ENCOUNTER (OUTPATIENT)
Age: 38
End: 2023-12-06

## 2023-12-06 ENCOUNTER — ASOB RESULT (OUTPATIENT)
Age: 38
End: 2023-12-06

## 2023-12-06 ENCOUNTER — APPOINTMENT (OUTPATIENT)
Dept: ANTEPARTUM | Facility: CLINIC | Age: 38
End: 2023-12-06
Payer: MEDICAID

## 2023-12-06 PROBLEM — D21.9 BENIGN NEOPLASM OF CONNECTIVE AND OTHER SOFT TISSUE, UNSPECIFIED: Chronic | Status: ACTIVE | Noted: 2023-11-22

## 2023-12-06 PROCEDURE — 76817 TRANSVAGINAL US OBSTETRIC: CPT

## 2023-12-06 PROCEDURE — 76816 OB US FOLLOW-UP PER FETUS: CPT

## 2024-01-08 ENCOUNTER — APPOINTMENT (OUTPATIENT)
Dept: ANTEPARTUM | Facility: CLINIC | Age: 39
End: 2024-01-08
Payer: MEDICAID

## 2024-01-08 ENCOUNTER — ASOB RESULT (OUTPATIENT)
Age: 39
End: 2024-01-08

## 2024-01-08 PROCEDURE — 76816 OB US FOLLOW-UP PER FETUS: CPT

## 2024-01-17 NOTE — ED ADULT NURSE NOTE - NS ED NURSE IV DC DT
NOC HOSPITALIST CROSS COVER    Notified by RN regarding critical fingerstick blood glucose of 430.  Instructed bedside RN to give previously ordered 9 units of regular insulin and a 500 mL NS bolus and to recheck blood glucose in approximately 1 hour.      Vitals:    01/16/24 2005   BP: (!) 145/75   Pulse: 91   Resp: 18   Temp: 36.4 °C (97.5 °F)   SpO2: 95%        Plan:  # Hyperglycemia  -500 mL NS bolus  -9 units regular insulin SC per sliding scale  -Stat BMP  -Recheck fingerstick glucose 1 hour post IV fluid bolus and insulin administration      -----------------------------------------------------------------------------------------------------------    Electronically signed by:  Maurilio Rivas, JURGEN, APRN, SAEEDP-BC  Hospitalist Services          31-Jan-2017 16:15

## 2024-02-08 ENCOUNTER — APPOINTMENT (OUTPATIENT)
Dept: ANTEPARTUM | Facility: CLINIC | Age: 39
End: 2024-02-08

## 2024-02-08 ENCOUNTER — INPATIENT (INPATIENT)
Facility: HOSPITAL | Age: 39
LOS: 7 days | Discharge: HOME | End: 2024-02-16
Attending: STUDENT IN AN ORGANIZED HEALTH CARE EDUCATION/TRAINING PROGRAM | Admitting: STUDENT IN AN ORGANIZED HEALTH CARE EDUCATION/TRAINING PROGRAM
Payer: MEDICAID

## 2024-02-08 VITALS
SYSTOLIC BLOOD PRESSURE: 132 MMHG | RESPIRATION RATE: 16 BRPM | TEMPERATURE: 98 F | DIASTOLIC BLOOD PRESSURE: 83 MMHG | HEART RATE: 93 BPM

## 2024-02-08 DIAGNOSIS — O26.899 OTHER SPECIFIED PREGNANCY RELATED CONDITIONS, UNSPECIFIED TRIMESTER: ICD-10-CM

## 2024-02-08 DIAGNOSIS — O42.90 PREMATURE RUPTURE OF MEMBRANES, UNSPECIFIED AS TO LENGTH OF TIME BETWEEN RUPTURE AND ONSET OF LABOR, UNSPECIFIED WEEKS OF GESTATION: ICD-10-CM

## 2024-02-08 DIAGNOSIS — Z98.890 OTHER SPECIFIED POSTPROCEDURAL STATES: Chronic | ICD-10-CM

## 2024-02-08 DIAGNOSIS — Z98.89 OTHER SPECIFIED POSTPROCEDURAL STATES: Chronic | ICD-10-CM

## 2024-02-08 DIAGNOSIS — Z98.891 HISTORY OF UTERINE SCAR FROM PREVIOUS SURGERY: Chronic | ICD-10-CM

## 2024-02-08 LAB
APPEARANCE UR: CLEAR — SIGNIFICANT CHANGE UP
BASOPHILS # BLD AUTO: 0.02 K/UL — SIGNIFICANT CHANGE UP (ref 0–0.2)
BASOPHILS NFR BLD AUTO: 0.3 % — SIGNIFICANT CHANGE UP (ref 0–2)
BILIRUB UR-MCNC: NEGATIVE — SIGNIFICANT CHANGE UP
BLD GP AB SCN SERPL QL: NEGATIVE — SIGNIFICANT CHANGE UP
COLOR SPEC: YELLOW — SIGNIFICANT CHANGE UP
DIFF PNL FLD: NEGATIVE — SIGNIFICANT CHANGE UP
EOSINOPHIL # BLD AUTO: 0.07 K/UL — SIGNIFICANT CHANGE UP (ref 0–0.5)
EOSINOPHIL NFR BLD AUTO: 0.9 % — SIGNIFICANT CHANGE UP (ref 0–6)
GLUCOSE UR QL: NEGATIVE MG/DL — SIGNIFICANT CHANGE UP
HCT VFR BLD CALC: 33.4 % — LOW (ref 34.5–45)
HGB BLD-MCNC: 11.4 G/DL — LOW (ref 11.5–15.5)
IANC: 5.79 K/UL — SIGNIFICANT CHANGE UP (ref 1.8–7.4)
IMM GRANULOCYTES NFR BLD AUTO: 1.6 % — HIGH (ref 0–0.9)
KETONES UR-MCNC: ABNORMAL MG/DL
LEUKOCYTE ESTERASE UR-ACNC: NEGATIVE — SIGNIFICANT CHANGE UP
LYMPHOCYTES # BLD AUTO: 1.33 K/UL — SIGNIFICANT CHANGE UP (ref 1–3.3)
LYMPHOCYTES # BLD AUTO: 16.7 % — SIGNIFICANT CHANGE UP (ref 13–44)
MCHC RBC-ENTMCNC: 29.5 PG — SIGNIFICANT CHANGE UP (ref 27–34)
MCHC RBC-ENTMCNC: 34.1 GM/DL — SIGNIFICANT CHANGE UP (ref 32–36)
MCV RBC AUTO: 86.3 FL — SIGNIFICANT CHANGE UP (ref 80–100)
MONOCYTES # BLD AUTO: 0.63 K/UL — SIGNIFICANT CHANGE UP (ref 0–0.9)
MONOCYTES NFR BLD AUTO: 7.9 % — SIGNIFICANT CHANGE UP (ref 2–14)
NEUTROPHILS # BLD AUTO: 5.79 K/UL — SIGNIFICANT CHANGE UP (ref 1.8–7.4)
NEUTROPHILS NFR BLD AUTO: 72.6 % — SIGNIFICANT CHANGE UP (ref 43–77)
NITRITE UR-MCNC: NEGATIVE — SIGNIFICANT CHANGE UP
NRBC # BLD: 0 /100 WBCS — SIGNIFICANT CHANGE UP (ref 0–0)
NRBC # FLD: 0 K/UL — SIGNIFICANT CHANGE UP (ref 0–0)
PH UR: 7 — SIGNIFICANT CHANGE UP (ref 5–8)
PLATELET # BLD AUTO: 179 K/UL — SIGNIFICANT CHANGE UP (ref 150–400)
PROT UR-MCNC: SIGNIFICANT CHANGE UP MG/DL
RBC # BLD: 3.87 M/UL — SIGNIFICANT CHANGE UP (ref 3.8–5.2)
RBC # FLD: 12.4 % — SIGNIFICANT CHANGE UP (ref 10.3–14.5)
RH IG SCN BLD-IMP: POSITIVE — SIGNIFICANT CHANGE UP
SP GR SPEC: 1.02 — SIGNIFICANT CHANGE UP (ref 1–1.03)
T PALLIDUM AB TITR SER: NEGATIVE — SIGNIFICANT CHANGE UP
UROBILINOGEN FLD QL: 1 MG/DL — SIGNIFICANT CHANGE UP (ref 0.2–1)
WBC # BLD: 7.97 K/UL — SIGNIFICANT CHANGE UP (ref 3.8–10.5)
WBC # FLD AUTO: 7.97 K/UL — SIGNIFICANT CHANGE UP (ref 3.8–10.5)

## 2024-02-08 RX ORDER — INFLUENZA VIRUS VACCINE 15; 15; 15; 15 UG/.5ML; UG/.5ML; UG/.5ML; UG/.5ML
0.5 SUSPENSION INTRAMUSCULAR ONCE
Refills: 0 | Status: DISCONTINUED | OUTPATIENT
Start: 2024-02-08 | End: 2024-02-16

## 2024-02-08 RX ORDER — OXYTOCIN 10 UNIT/ML
333.33 VIAL (ML) INJECTION
Qty: 20 | Refills: 0 | Status: DISCONTINUED | OUTPATIENT
Start: 2024-02-08 | End: 2024-02-08

## 2024-02-08 RX ORDER — GENTAMICIN SULFATE 40 MG/ML
400 VIAL (ML) INJECTION EVERY 24 HOURS
Refills: 0 | Status: COMPLETED | OUTPATIENT
Start: 2024-02-08 | End: 2024-02-09

## 2024-02-08 RX ORDER — HEPARIN SODIUM 5000 [USP'U]/ML
5000 INJECTION INTRAVENOUS; SUBCUTANEOUS EVERY 12 HOURS
Refills: 0 | Status: DISCONTINUED | OUTPATIENT
Start: 2024-02-08 | End: 2024-02-12

## 2024-02-08 RX ORDER — CHLORHEXIDINE GLUCONATE 213 G/1000ML
1 SOLUTION TOPICAL DAILY
Refills: 0 | Status: DISCONTINUED | OUTPATIENT
Start: 2024-02-08 | End: 2024-02-13

## 2024-02-08 RX ORDER — SODIUM CHLORIDE 9 MG/ML
1000 INJECTION, SOLUTION INTRAVENOUS
Refills: 0 | Status: DISCONTINUED | OUTPATIENT
Start: 2024-02-08 | End: 2024-02-08

## 2024-02-08 RX ORDER — AZITHROMYCIN 500 MG/1
1000 TABLET, FILM COATED ORAL ONCE
Refills: 0 | Status: COMPLETED | OUTPATIENT
Start: 2024-02-08 | End: 2024-02-08

## 2024-02-08 RX ADMIN — CHLORHEXIDINE GLUCONATE 1 APPLICATION(S): 213 SOLUTION TOPICAL at 10:49

## 2024-02-08 RX ADMIN — Medication 100 MILLIGRAM(S): at 21:25

## 2024-02-08 RX ADMIN — Medication 1 TABLET(S): at 23:20

## 2024-02-08 RX ADMIN — SODIUM CHLORIDE 125 MILLILITER(S): 9 INJECTION, SOLUTION INTRAVENOUS at 11:44

## 2024-02-08 RX ADMIN — Medication 500 MILLIGRAM(S): at 11:42

## 2024-02-08 RX ADMIN — Medication 12 MILLIGRAM(S): at 11:29

## 2024-02-08 RX ADMIN — Medication 100 MILLIGRAM(S): at 12:38

## 2024-02-08 RX ADMIN — AZITHROMYCIN 1000 MILLIGRAM(S): 500 TABLET, FILM COATED ORAL at 10:53

## 2024-02-08 RX ADMIN — HEPARIN SODIUM 5000 UNIT(S): 5000 INJECTION INTRAVENOUS; SUBCUTANEOUS at 23:19

## 2024-02-08 NOTE — OB PROVIDER TRIAGE NOTE - HISTORY OF PRESENT ILLNESS
39yo female  @ 32.6 wks SLIUP uncomp PNC here complaining of leaking of clear fluid that started at 8:15am. Pt reports some mild lower abdominal cramping. Pt being followed by ATU for AMA and was last seen 2024. Fetus with unilateral pyelectasis that has resolved. Pt reports GFM.  Pt is a previous  for breech presentation in  with an abdominoplasty in .    PNC uncomplicated

## 2024-02-08 NOTE — OB PROVIDER TRIAGE NOTE - NSNYCREQUIREMENTS_OBGYN_ALL_OB
EXAMINATION note    Chief Complaint   Patient presents with   • Follow-up     yearly follow up for anxiety/depression. GERD, and hyperlipidemia         History:    Hipolito Shearer is a 40 year old male who presents for a annual physical exam. States he is feeling well, sleeping good at night. His mood has been good as well. His weight has increased 10 lbs in the past 8 months, weight today 275. States eating more fast food lately, due to where he is working.   His blood pressure in office today is elevated, Denies any dizziness or lightheadedness when going from a sitting to standing position. States taking medications as directed with no side effects. The patient denies chest pain, shortness of breath, palpitations, headaches or lower extremity edema.   I have reviewed the past medical, family and social history sections including the medications and allergies listed in the above medical record as well as the nursing notes.     There are no active problems to display for this patient.      Current Outpatient Medications   Medication Sig   • venlafaxine (EFFEXOR) 75 MG tablet Take 1 tablet by mouth daily.   • pantoprazole (PROTONIX) 40 MG tablet Take 1 tablet by mouth daily.   • escitalopram (LEXAPRO) 10 MG tablet Take 1 tablet by mouth daily.   • atorvastatin (Lipitor) 20 MG tablet Take 1 tablet by mouth daily.   • losartan (COZAAR) 50 MG tablet Take 1 tablet by mouth daily.   • mupirocin (BACTROBAN) 2 % ointment Apply topically 3 times daily.   • cefdinir (OMNICEF) 300 MG capsule Take 1 capsule by mouth 2 times daily.     No current facility-administered medications for this visit.        Vital Signs:    Vitals:    09/14/20 1015 09/14/20 1029   BP: (!) 150/100 (!) 152/110   Pulse: 63    Resp: 18    Temp: 97.2 °F (36.2 °C)    SpO2: 97%    Weight: 124.8 kg    Height: 6' (1.829 m)        Review of systems;    Constitutional:  Patient denies fever, chills, tiredness or malaise.    Eyes:  Denies change in visual  acuity, pain, burning or itching.    HENT:  Denies sinus problems, ear infections, nasal congestion or sore throat.    Respiratory:  Denies cough, shortness of breath.    Cardiovascular:  Denies chest pain, edema.    Gastrointestinal:  Denies abdominal pain, nausea, vomiting, bloody stools or diarrhea.    Musculoskeletal:  Denies back pain, neck pain, joint pain or leg swelling.    Integument:  Denies rash, itching.    Neurologic:  Denies headache, focal weakness or sensory changes.      All other systems reviewed and negative      Physical ExamINATION:    Constitutional:  In no acute distress.  Appears stated age.  Cooperative throughout exam.   Integument:  Warm.  Dry.  No erythema.  No rash.    Eyes:  PERRL (Pupils equal, round, reactive to light), EOMI (extraocular movements intact).  Conjunctivae normal.  No discharge.   HENT:  Normocephalic.  Atraumatic.  Bilateral external ears normal.  Oropharynx moist. No oral exudates.  Nose normal.   Neck:  Normal range of motion.  No masses.  No tenderness.  Supple.  No stridor.      Respiratory:  Normal breath sounds.  No respiratory distress.  No wheezing.  No crackles.  No rhonchi.  No chest tenderness.    Cardiovascular:  Normal heart rate.  Normal rhythm.  No murmurs.  No rubs.  No gallops.    Gastrointestinal:  Bowel sounds normal.  Soft.  No tenderness.  No masses.  No pulsatile masses.  No hepatomegaly.  No splenomegaly.  Musculoskeletal:  Normal strength.  Normal reflexes.    Assessment AND Plan:      1. Hypertension, elevated  Started on Losartan 50 mg daily, discussed side effects. Check blood pressure at home     2. Gastro esophageal reflux disease.  Continue Protonix 40 mg daily     3. Anxiety/Depression   Continue Effexor 75 mg daily including Lexapro 10 mg daily     4. Mixed Hyperlipidemia  Continue Lipitor 20 mg daily     5. Physical Exam           On 9/14/2020, Latisha BULLOCK scribed the services personally performed by Jonathan Red MD.       The  documentation recorded by the scribe accurately and completely reflects the service(s) I personally performed and the decisions made by me.                            ELIAS

## 2024-02-08 NOTE — OB PROVIDER TRIAGE NOTE - NSOBPROVIDERNOTE_OBGYN_ALL_OB_FT
37yo female  @ 32.6 wks SLIUP uncomp PNC here with confirmed rupture of membranes since 8:15a  -pt was admitted and discussed with Dr Ng and Dr Tuttle PGY3  -pt for PPROM abx protocol  -pt for betamethasone  -Fetal arrythmia noted to Dr Tuttle and Dr Ng

## 2024-02-08 NOTE — OB RN PATIENT PROFILE - NSSDOHLIVINGSIT_OBGYN_A_OB
I do not have a steady place to live (I am temporarily staying with others, in a hotel, in a shelter, living outside on the street, on a beach, in a car, abandoned building, bus or train station, or in a park) I have a steady place to live

## 2024-02-08 NOTE — CHART NOTE - NSCHARTNOTEFT_GEN_A_CORE
Pt desires to attempt TOLAC. Prior C/S due to breech at [facility] and abdominoplasty. Here for PPROM at 32w6d. Risk and benefits discussed with pt. Pt voiced understanding and agrees with plan. Consent signed.

## 2024-02-08 NOTE — OB PROVIDER TRIAGE NOTE - NSHPPHYSICALEXAM_GEN_ALL_CORE
ICU Vital Signs Last 24 Hrs  T(C): 36.4 (2024 09:34), Max: 36.4 (2024 09:20)  T(F): 97.52 (2024 09:34), Max: 97.52 (2024 09:34)  HR: 93 (2024 09:34) (93 - 93)  BP: 132/83 (2024 09:34) (132/83 - 132/83)  BP(mean): --  ABP: --  ABP(mean): --  RR: 16 (2024 09:34) (16 - 16)  SpO2: --    O2 Parameters below as of 2024 09:20  Patient On (Oxygen Delivery Method): room air    Gen: A&O x 3; NAD    Neuro- symmetrical facial features with no slurring of words  Pulm- breathing is unlabored  Abd exam- soft and nontender  - soft and nontender   Extremities- full range of motion x 4    NST reactive with 135 baseline with mod variability; no ctx's. + audible fetal arrythmia (dropped beat)  GBS positive  SSE- Vaginal fills up half way with amniotic fluid; + Nitrazine; + ferning  VE-0.5/70/-3  Limited sono- Images saved to sono- posterior placenta; MVP-4.39; vtx  EFW~1871

## 2024-02-08 NOTE — OB RN PATIENT PROFILE - FALL HARM RISK - UNIVERSAL INTERVENTIONS
Bed in lowest position, wheels locked, appropriate side rails in place/Call bell, personal items and telephone in reach/Instruct patient to call for assistance before getting out of bed or chair/Non-slip footwear when patient is out of bed/[place] to call system/Physically safe environment - no spills, clutter or unnecessary equipment/Purposeful Proactive Rounding/Room/bathroom lighting operational, light cord in reach

## 2024-02-08 NOTE — OB PROVIDER H&P - ASSESSMENT
39yo female  @ 32.6 wks SLIUP uncomp PNC here with confirmed rupture of membranes since 8:15a  -pt was admitted and discussed with Dr Ng and Dr Tuttle PGY3  -pt for PPROM abx protocol  -pt for betamethasone  -Fetal arrythmia noted to Dr Tuttle and Dr Ng 37yo female  @ 32.6 wks SLIUP uncomp PNC here with confirmed rupture of membranes since 8:15a  -pt was admitted and discussed with Dr Ng and Dr Tuttle PGY3  -pt for PPROM abx protocol  -pt for betamethasone  -Fetal arrythmia noted to Dr Tuttle and Dr Ng    Agree with above findings. Pt was evaluated in triage. 37yo P1 @ 32.6wga with PPROM. clear. ctxs are irregular. Pt is doing well with no complaints. Reactive NST. Informed of potential fetal arrythmia.   Plan for continuos monitoring with TOCO/EFM  NPO for now and will reassess   Plan for Abx and BTX and NICU consult. Anticipate NICU consult  Plan for RCS at 34wga unless maternal or obstretical indications  Awaiting MFM consult in regards to potential fetal arrythmia.   Dixie Ng MD MPH

## 2024-02-09 ENCOUNTER — APPOINTMENT (OUTPATIENT)
Dept: ANTEPARTUM | Facility: CLINIC | Age: 39
End: 2024-02-09
Payer: MEDICAID

## 2024-02-09 ENCOUNTER — ASOB RESULT (OUTPATIENT)
Age: 39
End: 2024-02-09

## 2024-02-09 DIAGNOSIS — O42.10 PREMATURE RUPTURE OF MEMBRANES, ONSET OF LABOR MORE THAN 24 HOURS FOLLOWING RUPTURE, UNSPECIFIED WEEKS OF GESTATION: ICD-10-CM

## 2024-02-09 PROCEDURE — 76819 FETAL BIOPHYS PROFIL W/O NST: CPT | Mod: 26,59

## 2024-02-09 PROCEDURE — 76816 OB US FOLLOW-UP PER FETUS: CPT | Mod: 26

## 2024-02-09 RX ORDER — FAMOTIDINE 10 MG/ML
20 INJECTION INTRAVENOUS DAILY
Refills: 0 | Status: DISCONTINUED | OUTPATIENT
Start: 2024-02-09 | End: 2024-02-16

## 2024-02-09 RX ADMIN — HEPARIN SODIUM 5000 UNIT(S): 5000 INJECTION INTRAVENOUS; SUBCUTANEOUS at 22:40

## 2024-02-09 RX ADMIN — FAMOTIDINE 20 MILLIGRAM(S): 10 INJECTION INTRAVENOUS at 22:32

## 2024-02-09 RX ADMIN — Medication 500 MILLIGRAM(S): at 11:29

## 2024-02-09 RX ADMIN — CHLORHEXIDINE GLUCONATE 1 APPLICATION(S): 213 SOLUTION TOPICAL at 10:06

## 2024-02-09 RX ADMIN — Medication 12 MILLIGRAM(S): at 11:34

## 2024-02-09 RX ADMIN — Medication 1 TABLET(S): at 22:31

## 2024-02-09 RX ADMIN — Medication 100 MILLIGRAM(S): at 15:03

## 2024-02-09 RX ADMIN — HEPARIN SODIUM 5000 UNIT(S): 5000 INJECTION INTRAVENOUS; SUBCUTANEOUS at 09:57

## 2024-02-09 RX ADMIN — Medication 100 MILLIGRAM(S): at 22:37

## 2024-02-09 RX ADMIN — FAMOTIDINE 20 MILLIGRAM(S): 10 INJECTION INTRAVENOUS at 01:16

## 2024-02-09 RX ADMIN — Medication 100 MILLIGRAM(S): at 06:07

## 2024-02-09 NOTE — PROGRESS NOTE ADULT - SUBJECTIVE AND OBJECTIVE BOX
R3 Antepartum Note    Patient seen and examined at bedside, no acute overnight events. No acute complaints. Pt reports +FM, denies LOF, VB, ctx, HA, epigastric pain, blurred vision, CP, SOB, N/V, fevers, and chills.    Vital Signs Last 24 Hours  T(C): 36.6 (02-09-24 @ 05:25), Max: 36.8 (02-08-24 @ 17:10)  HR: 88 (02-09-24 @ 05:25) (80 - 97)  BP: 102/57 (02-09-24 @ 05:25) (99/62 - 132/83)  RR: 17 (02-09-24 @ 05:24) (14 - 18)  SpO2: 96% (02-09-24 @ 05:24) (93% - 97%)    CAPILLARY BLOOD GLUCOSE          Physical Exam:  General: NAD  Abdomen: Soft, non-tender, gravid  Ext: No pain or swelling    Labs:             11.4   7.97  )-----------( 179      ( 02-08 @ 10:22 )             33.4                   MEDICATIONS  (STANDING):  betamethasone Injectable 12 milliGRAM(s) IntraMuscular every 24 hours  chlorhexidine 2% Cloths 1 Application(s) Topical daily  clindamycin   Capsule 300 milliGRAM(s) Oral every 8 hours  clindamycin IVPB 900 milliGRAM(s) IV Intermittent every 8 hours  clindamycin IVPB      famotidine    Tablet 20 milliGRAM(s) Oral daily  gentamicin   IVPB 400 milliGRAM(s) IV Intermittent every 24 hours  heparin   Injectable 5000 Unit(s) SubCutaneous every 12 hours  influenza   Vaccine 0.5 milliLiter(s) IntraMuscular once  prenatal multivitamin 1 Tablet(s) Oral daily    MEDICATIONS  (PRN):

## 2024-02-09 NOTE — PROGRESS NOTE ADULT - ASSESSMENT
AP 38y yo  at 33w admitted with PPROM at 32w6d, confirmed with gross pooling on speculum exam.  Patient is currently stable without signs of labor or infection.    #PPROM   - s/p azithro x1 ()  - c/w gent/clinda given hx of PCN allergy (rash)   - WBC 7.97 on admission   - NST BID/BPP 2x/week      #?Fetal arrhythmia   - fetal arrythmia heard by admitting provider, no previous hx   -  MFM consult this morning     #Maternal wellbeing   - Reg diet   - HSQ/venodynes for DVT prophylaxis     #Fetal wellbeing  - NST BID   - BPP 2x/week   - NICU consult     #Delivery   - TOD: TBD pending maternal/fetal eval.   - MOD: hx of prior  AP 38y yo  at 33w admitted with PPROM at 32w6d, confirmed with gross pooling on speculum exam.  Patient is currently stable without signs of labor or infection.    #PPROM   - s/p azithro x1 ()  - c/w gent/clinda given hx of PCN allergy (rash)   - WBC 7.97 on admission   - NST BID/BPP 2x/week      #?Fetal arrhythmia   - fetal arrythmia heard by admitting provider, no previous hx   -  MFM consult this morning     #Maternal wellbeing   - Reg diet   - HSQ/venodynes for DVT prophylaxis     #Fetal wellbeing  - NST BID   - BPP 2x/week   - NICU consult     #Delivery   - TOD: TBD pending maternal/fetal eval.   - MOD: hx of prior C/S.  vs. repeat C/S pending spontaneous labor     MARU Tuttle PGY3

## 2024-02-09 NOTE — PROGRESS NOTE ADULT - NSPROGADDITIONALINFOA_GEN_ALL_CORE
MFM Fellow Addendum:    39 YO  at 33w0d with history of prior  section admitted with PPROM. Patient reports continued leakage of clear fluid and reports occasional contraction. She denies fevers/chills, vaginal bleeding or malodorous or purulent leakage. Patient afebrile, non tachycardic and normotensive. Reactive NST (baseline 140 bpm, moderate variability, presence of accelerations, no decelerations). Low concern for  labor, placental abruption or intraamniotic infection at this time. BMZ -. Latency antibiotics. GBS pending. Please check urine culture, urine GC/CT, urine trich. For NICU consult. Candidate for expectant management until 34w0d.    On initial fetal monitoring, concern for "skipped beat." Fetal monitoring and ultrasound without evidence of ectopic beats or arrhythmia. Possible that fetus did have ectopic beat such as PAC, but no further evaluation is indicated in the setting of a structurally normal heart on anatomy ultrasound. Reviewed with patient and family and in agreement with plan.    Molly Garcia MD  [facility] Fellow  Attg: Dr. Rainey

## 2024-02-10 LAB
CULTURE RESULTS: SIGNIFICANT CHANGE UP
SPECIMEN SOURCE: SIGNIFICANT CHANGE UP

## 2024-02-10 RX ADMIN — HEPARIN SODIUM 5000 UNIT(S): 5000 INJECTION INTRAVENOUS; SUBCUTANEOUS at 10:39

## 2024-02-10 RX ADMIN — FAMOTIDINE 20 MILLIGRAM(S): 10 INJECTION INTRAVENOUS at 18:08

## 2024-02-10 RX ADMIN — HEPARIN SODIUM 5000 UNIT(S): 5000 INJECTION INTRAVENOUS; SUBCUTANEOUS at 22:18

## 2024-02-10 RX ADMIN — CHLORHEXIDINE GLUCONATE 1 APPLICATION(S): 213 SOLUTION TOPICAL at 14:31

## 2024-02-10 RX ADMIN — Medication 100 MILLIGRAM(S): at 14:31

## 2024-02-10 RX ADMIN — Medication 1 TABLET(S): at 14:31

## 2024-02-10 RX ADMIN — Medication 100 MILLIGRAM(S): at 06:06

## 2024-02-10 NOTE — PROGRESS NOTE ADULT - ASSESSMENT
38y yo  at 33w1d admitted with PPROM at 32w6d, confirmed with gross pooling on speculum exam.  Patient is currently stable without signs of labor or infection.    #PPROM   - s/p azithro x1 ()  - c/w gent/clinda given hx of PCN allergy (rash)   - WBC 7.97 on admission   - NST BID/BPP 2x/week      #?Fetal arrhythmia   - fetal arrythmia heard by admitting provider, no previous hx   - no arrythmia on subsequent tracings    #Maternal wellbeing   - Reg diet   - HSQ/venodynes for DVT prophylaxis     #Fetal wellbeing  - NST BID   - BPP 2x/week   - NICU consult     #Delivery   - TOD: TBD pending maternal/fetal eval.   - MOD: hx of prior C/S.  vs. repeat C/S pending spontaneous labor     Amyeo Afroz Jereen, PGY-3   38y yo  at 33w1d admitted with PPROM at 32w6d, confirmed with gross pooling on speculum exam.  Patient is currently stable without signs of labor or infection.    #PPROM   - s/p azithro x1 ()  - c/w gent/clinda given hx of PCN allergy (rash)   - WBC 7.97 on admission   - NST BID/BPP 2x/week      #?Fetal arrhythmia   - fetal arrythmia heard by admitting provider, no previous hx   - no arrythmia on subsequent tracings    #Maternal wellbeing   - Reg diet   - HSQ/venodynes for DVT prophylaxis     #Fetal wellbeing  - NST BID   - BPP 2x/week   - NICU consult     #Delivery   - TOD: TBD pending maternal/fetal eval.   - MOD: hx of prior C/S.  vs. repeat C/S pending spontaneous labor     Amyeo Afroz Jereen, PGY-3    agree with the above findings. Doing well with no complaints. Denies ctxs, vb. +fm. still leaking. 37yo P1 at 33w1d with PPROM. s/p latency abx. s/p btx. fetal arrhythmia has not been detected in subsequent nst. possible PAC. NST BID/BPP 2x a week. Req diet. Heparin for DVT ppx. Plan for RCS at 34weeks vs. TOLAC if pt goes into spontaneous labor.   Dixie Ng MD MPH 38y yo  at 33w1d admitted with PPROM at 32w6d, confirmed with gross pooling on speculum exam.  Patient is currently stable without signs of labor or infection.    #PPROM   - s/p azithro x1 ()  - c/w gent/clinda given hx of PCN allergy (rash)   - WBC 7.97 on admission   - NST BID/BPP 2x/week      #?Fetal arrhythmia   - fetal arrythmia heard by admitting provider, no previous hx   - no arrythmia on subsequent tracings    #Maternal wellbeing   - Reg diet   - HSQ/venodynes for DVT prophylaxis     #Fetal wellbeing  - NST BID   - BPP 2x/week   - NICU consult     #Delivery   - TOD: TBD pending maternal/fetal eval.   - MOD: hx of prior C/S.  vs. repeat C/S pending spontaneous labor     Amyeo Afroz Jereen, PGY-3    agree with the above findings. Doing well with no complaints. Denies ctxs, vb. +fm. still leaking. 37yo P1 at 33w1d with PPROM. VSS. NST reactive. WBC wnl. s/p latency abx. s/p btx. fetal arrhythmia has not been detected in subsequent nst. possible PAC. NST BID/BPP 2x a week. Req diet. Heparin for DVT ppx. Plan for RCS at 34weeks vs. TOLAC if pt goes into spontaneous labor.   Dixie Ng MD MPH

## 2024-02-10 NOTE — CONSULT NOTE PEDS - SUBJECTIVE AND OBJECTIVE BOX
Ms. Dewitt is a 37 y/o  admitted at 33w gestational age. Maternal history notable for PPROM at 32 6/7, and prenatal history notable for fetal unilateral pyelectasis that has resolved, and possible audible fetal arrythmia auscultated by OB provider on admission. Most recent EFW 1871g.    I met with Ms. Dewitt and discussed what to expect should she deliver at 33 weeks gestation. We discussed the followin. The NICU team will be present at her delivery and will immediately assess and care for her infant.    2. The infant may require respiratory support, most commonly in the form of nasal CPAP. While unlikely, there is a small possibility that the infant would require intubation and mechanical ventilation.    3. Depending on the clinical status of the infant, enteral feedings may or may not be started immediately. The infant will receive IVF/IV nutrition as necessary. Due to immature suck/swallow, orogastric or nasogastric tube may be required once feeds are initiated. The infant is also at risk for hypoglycemia due to prematurity.    4. Discussed the benefits of breastfeeding in  infants and encouraged mother to pump following delivery.    5. Due to prematurity, the infant is at increased risk for jaundice, which can be treated with phototherapy.    6. The infant will be screened for infection and treated with antibiotics if deemed clinically necessary.    7. The infant is at risk for developmental delays as a consequence of prematurity. The infant will be evaluated by a developmental pediatrician to monitor for neurodevelopmental delays.    8. Thermoregulation issues and need to be in an isolette with slow weaning to a crib discussed.    9. The infant is at risk for developmental delays as a consequence of prematurity. The infant will be evaluated by a developmental pediatrician to monitor for neurodevelopmental delays.    10. Length of stay is highly variable, but given the infant’s weight and gestational age, the family may expect an average stay of about 2 weeks. Reviewed discharge criteria.    Ms. Dewitt had the opportunity to ask questions and may contact the NICU at any time if further questions arise.    Thank you for the opportunity to participate in the care of this patient and please inform us of any changes in her status.

## 2024-02-10 NOTE — PROGRESS NOTE ADULT - SUBJECTIVE AND OBJECTIVE BOX
R3 Antepartum Note, HD#3    Patient seen and examined at bedside, no acute overnight events. No acute complaints. Pt reports +FM, +LOF, denies VB, ctx, HA, epigastric pain, blurred vision, CP, SOB, N/V, fevers, and chills.    Vital Signs Last 24 Hours  T(C): 36.7 (02-10-24 @ 06:00), Max: 36.7 (02-09-24 @ 09:08)  HR: 73 (02-10-24 @ 06:00) (73 - 102)  BP: 98/54 (02-10-24 @ 06:00) (98/54 - 116/61)  RR: 17 (02-10-24 @ 06:00) (15 - 17)  SpO2: 98% (02-10-24 @ 06:00) (96% - 98%)    CAPILLARY BLOOD GLUCOSE          Physical Exam:  General: NAD  Abdomen: Soft, non-tender, gravid  Ext: No pain or swelling    NST reactive overnight    Labs:             11.4   7.97  )-----------( 179      ( 02-08 @ 10:22 )             33.4                   MEDICATIONS  (STANDING):  chlorhexidine 2% Cloths 1 Application(s) Topical daily  clindamycin   Capsule 300 milliGRAM(s) Oral every 8 hours  clindamycin IVPB      clindamycin IVPB 900 milliGRAM(s) IV Intermittent every 8 hours  famotidine    Tablet 20 milliGRAM(s) Oral daily  heparin   Injectable 5000 Unit(s) SubCutaneous every 12 hours  influenza   Vaccine 0.5 milliLiter(s) IntraMuscular once  prenatal multivitamin 1 Tablet(s) Oral daily    MEDICATIONS  (PRN):

## 2024-02-11 ENCOUNTER — TRANSCRIPTION ENCOUNTER (OUTPATIENT)
Age: 39
End: 2024-02-11

## 2024-02-11 LAB
ANISOCYTOSIS BLD QL: SLIGHT — SIGNIFICANT CHANGE UP
BLD GP AB SCN SERPL QL: NEGATIVE — SIGNIFICANT CHANGE UP
HCT VFR BLD CALC: 35.5 % — SIGNIFICANT CHANGE UP (ref 34.5–45)
HGB BLD-MCNC: 11.7 G/DL — SIGNIFICANT CHANGE UP (ref 11.5–15.5)
MACROCYTES BLD QL: SLIGHT — SIGNIFICANT CHANGE UP
MANUAL SMEAR VERIFICATION: SIGNIFICANT CHANGE UP
MCHC RBC-ENTMCNC: 29.3 PG — SIGNIFICANT CHANGE UP (ref 27–34)
MCHC RBC-ENTMCNC: 33 GM/DL — SIGNIFICANT CHANGE UP (ref 32–36)
MCV RBC AUTO: 88.8 FL — SIGNIFICANT CHANGE UP (ref 80–100)
MICROCYTES BLD QL: SIGNIFICANT CHANGE UP
MYELOCYTES NFR BLD: 0.9 % — HIGH (ref 0–0)
PLAT MORPH BLD: NORMAL — SIGNIFICANT CHANGE UP
PLATELET # BLD AUTO: 189 K/UL — SIGNIFICANT CHANGE UP (ref 150–400)
PLATELET COUNT - ESTIMATE: NORMAL — SIGNIFICANT CHANGE UP
POLYCHROMASIA BLD QL SMEAR: SLIGHT — SIGNIFICANT CHANGE UP
RBC # BLD: 4 M/UL — SIGNIFICANT CHANGE UP (ref 3.8–5.2)
RBC # FLD: 12.7 % — SIGNIFICANT CHANGE UP (ref 10.3–14.5)
RBC BLD AUTO: NORMAL — SIGNIFICANT CHANGE UP
RH IG SCN BLD-IMP: POSITIVE — SIGNIFICANT CHANGE UP
SMUDGE CELLS # BLD: PRESENT — SIGNIFICANT CHANGE UP
SPHEROCYTES BLD QL SMEAR: SLIGHT — SIGNIFICANT CHANGE UP
VARIANT LYMPHS # BLD: 4.5 % — SIGNIFICANT CHANGE UP (ref 0–6)
WBC # BLD: 13.9 K/UL — HIGH (ref 3.8–10.5)
WBC # FLD AUTO: 13.9 K/UL — HIGH (ref 3.8–10.5)

## 2024-02-11 RX ORDER — ACETAMINOPHEN 500 MG
975 TABLET ORAL EVERY 6 HOURS
Refills: 0 | Status: DISCONTINUED | OUTPATIENT
Start: 2024-02-11 | End: 2024-02-12

## 2024-02-11 RX ORDER — SODIUM CHLORIDE 9 MG/ML
1000 INJECTION, SOLUTION INTRAVENOUS ONCE
Refills: 0 | Status: COMPLETED | OUTPATIENT
Start: 2024-02-11 | End: 2024-02-11

## 2024-02-11 RX ORDER — CALCIUM CARBONATE 500(1250)
2 TABLET ORAL EVERY 6 HOURS
Refills: 0 | Status: DISCONTINUED | OUTPATIENT
Start: 2024-02-11 | End: 2024-02-16

## 2024-02-11 RX ADMIN — Medication 300 MILLIGRAM(S): at 08:09

## 2024-02-11 RX ADMIN — Medication 1 TABLET(S): at 11:18

## 2024-02-11 RX ADMIN — Medication 300 MILLIGRAM(S): at 13:50

## 2024-02-11 RX ADMIN — FAMOTIDINE 20 MILLIGRAM(S): 10 INJECTION INTRAVENOUS at 21:16

## 2024-02-11 RX ADMIN — SODIUM CHLORIDE 1000 MILLILITER(S): 9 INJECTION, SOLUTION INTRAVENOUS at 19:31

## 2024-02-11 RX ADMIN — Medication 2 TABLET(S): at 11:27

## 2024-02-11 RX ADMIN — Medication 975 MILLIGRAM(S): at 20:25

## 2024-02-11 RX ADMIN — Medication 300 MILLIGRAM(S): at 21:16

## 2024-02-11 RX ADMIN — Medication 300 MILLIGRAM(S): at 01:45

## 2024-02-11 RX ADMIN — Medication 975 MILLIGRAM(S): at 19:28

## 2024-02-11 RX ADMIN — HEPARIN SODIUM 5000 UNIT(S): 5000 INJECTION INTRAVENOUS; SUBCUTANEOUS at 16:42

## 2024-02-11 RX ADMIN — CHLORHEXIDINE GLUCONATE 1 APPLICATION(S): 213 SOLUTION TOPICAL at 11:26

## 2024-02-11 NOTE — PROGRESS NOTE ADULT - ASSESSMENT
38y yo  at 33w2d admitted with PPROM at 32w6d, confirmed with gross pooling on speculum exam.  Patient is currently stable without signs of labor or infection.    #PPROM  - s/p azithro x1 (), gent (-)  - c/w clinda   - alternative abx regimen given hx of PCN allergy (rash)  - WBC 7.97 on admission  - NST BID/BPP 2x/week      #?Fetal arrhythmia  - fetal arrythmia heard by admitting provider, no previous hx  - no arrythmia on subsequent tracings    #Maternal wellbeing  - Reg diet  - HSQ/venodynes for DVT prophylaxis    #Fetal wellbeing  - NST BID  - BPP 2x/week  - NICU consult    #Delivery  - TOD: TBD pending maternal/fetal eval.  - MOD: hx of prior C/S.  vs. repeat C/S pending spontaneous labor     MARU Tuttle PGY3

## 2024-02-11 NOTE — CHART NOTE - NSCHARTNOTEFT_GEN_A_CORE
Attending note    39 yo p1 with IUP 33 2/7wks g admitted with PPPROM on 2/8/24.  Patient reports small leakage of blood tinged fluid this morning.  Denies painful contractions, heavy vaginal bleeding, back pain, dysuria, etc.  Reports active fetal movements.    VS T 36.7  P 90  R 15  BP 98/54      heent nl  abd soft nontender  's reactive category I this morning  Neuro AAOx 3    A; 39 yo P1 with IUP 33 2/7wks ga PPPROM,s/p Betamethosone, on latency antibiotics, clinically    stable  P: Monitor for ssx of labor and or infection     Case discussed with MFM during morning rounds     Continue antepartum surveillance     Anticipate delivery at 34wks     Aravind

## 2024-02-11 NOTE — PROGRESS NOTE ADULT - SUBJECTIVE AND OBJECTIVE BOX
R3 Antepartum Note    Patient seen and examined at bedside, no acute overnight events. No acute complaints. Pt reports +FM, denies VB, ctx, HA, epigastric pain, blurred vision, CP, SOB, N/V, fevers, and chills.    Vital Signs Last 24 Hours  T(C): 36.7 (02-11-24 @ 05:18), Max: 37.2 (02-10-24 @ 21:25)  HR: 92 (02-11-24 @ 05:18) (73 - 100)  BP: 97/52 (02-11-24 @ 05:18) (95/51 - 120/72)  RR: 18 (02-11-24 @ 05:18) (15 - 18)  SpO2: 97% (02-11-24 @ 05:18) (92% - 98%)    CAPILLARY BLOOD GLUCOSE          Physical Exam:  General: NAD  Abdomen: Soft, non-tender, gravid  Ext: No pain or swelling    Labs:                MEDICATIONS  (STANDING):  chlorhexidine 2% Cloths 1 Application(s) Topical daily  clindamycin   Capsule 300 milliGRAM(s) Oral every 8 hours  famotidine    Tablet 20 milliGRAM(s) Oral daily  heparin   Injectable 5000 Unit(s) SubCutaneous every 12 hours  influenza   Vaccine 0.5 milliLiter(s) IntraMuscular once  prenatal multivitamin 1 Tablet(s) Oral daily    MEDICATIONS  (PRN):

## 2024-02-11 NOTE — CHART NOTE - NSCHARTNOTEFT_GEN_A_CORE
S: Patient with strong back and lower pelvic pain x1, now cramping. Reports her symptoms to be overall improving. Denies vaginal pressure at this time. Patient denies chest pain, shortness of breath, fevers, chills, nausea, vomiting, diarrhea.    O:   ELIAS MORRISSEY LTA4 [facility]  T(C): , Max: 36.7 (02-11-24 @ 09:26)  HR:  (80 - 93)  BP:  (98/54 - 119/72)  RR:  (15 - 16)  SpO2:  (97% - 98%)  02-11-24 @ 07:01  -  02-11-24 @ 19:25  --------------------------------------------------------  OUT: 0 mL/kg/hr    A/P: 39 yo P1 at 33w2d a/f PPPROM@32w6d, s/p BMZ (2/8-9) and currently receiving latency antibiotics, complaining of new onset abdominal cramping, low concern for chorio vs PTL given VSS, exam reassuring, and smx improving, will continue to observe:    - Administer 1L bolus  - STAT CBC to assess for leukocytosis status  - Tylenol PO for pain    Amyeo Afroz Jereen, PGY-3  d/w Dr Ng S: Patient with strong back and lower pelvic pain x1, now cramping. Reports her symptoms to be overall improving. Denies vaginal pressure at this time. Patient denies chest pain, shortness of breath, fevers, chills, nausea, vomiting, diarrhea.    O:   ELIAS MORRISSEY LTA4 [facility]  T(C): , Max: 36.7 (02-11-24 @ 09:26)  HR:  (80 - 93)  BP:  (98/54 - 119/72)  RR:  (15 - 16)  SpO2:  (97% - 98%)  02-11-24 @ 07:01  -  02-11-24 @ 19:25  --------------------------------------------------------  OUT: 0 mL/kg/hr    A/P: 39 yo P1 at 33w2d a/f PPROM@32w6d, s/p BMZ (2/8-9) and currently receiving latency antibiotics, complaining of new onset abdominal cramping, low concern for chorio vs PTL given VSS, exam reassuring, and smx improving, will continue to observe:    - Administer 1L bolus  - STAT CBC to assess for leukocytosis status  - Tylenol PO for pain    Amyeo Afroz Jereen, PGY-3  d/w Dr Ng S: Patient with strong back and lower pelvic pain x1, now cramping. Reports her symptoms to be overall improving. Denies vaginal pressure at this time. Patient denies chest pain, shortness of breath, fevers, chills, nausea, vomiting, diarrhea.    O:   ELIAS MORRISSEY LTA4 [facility]  T(C): , Max: 36.7 (02-11-24 @ 09:26)  HR:  (80 - 93)  BP:  (98/54 - 119/72)  RR:  (15 - 16)  SpO2:  (97% - 98%)  02-11-24 @ 07:01  -  02-11-24 @ 19:25  --------------------------------------------------------  OUT: 0 mL/kg/hr    A/P: 37 yo P1 at 33w2d a/f PPROM@32w6d, s/p BMZ (2/8-9) and currently receiving latency antibiotics, complaining of new onset abdominal cramping, low concern for chorio vs PTL given VSS, exam reassuring, and smx improving, will continue to observe:    - Administer 1L bolus  - STAT CBC to assess for leukocytosis status  - Tylenol PO for pain    Amyeo Afroz Jereen, PGY-3  d/w Dr Ng      Update:     CBC reviewed  Slight increased in WBC.  Will c/t monitor.    Amyeo Afroz Jereen, PGY-3  d/w Dr Ng S: Patient with strong back and lower pelvic pain x1, now cramping. Reports her symptoms to be overall improving. Denies vaginal pressure at this time. Patient denies chest pain, shortness of breath, fevers, chills, nausea, vomiting, diarrhea.    O:   ELIAS MORRISSEY LTA4 [facility]  T(C): , Max: 36.7 (02-11-24 @ 09:26)  HR:  (80 - 93)  BP:  (98/54 - 119/72)  RR:  (15 - 16)  SpO2:  (97% - 98%)  02-11-24 @ 07:01  -  02-11-24 @ 19:25  --------------------------------------------------------  OUT: 0 mL/kg/hr    PE: no fundal tenderness  SSE: deferred    A/P: 37 yo P1 at 33w2d a/f PPROM@32w6d, s/p BMZ (2/8-9) and currently receiving latency antibiotics, complaining of new onset abdominal cramping, low concern for chorio vs PTL given VSS, exam reassuring, and smx improving, will continue to observe:    - Administer 1L bolus  - STAT CBC to assess for leukocytosis status  - Tylenol PO for pain    Amyeo Afroz Jereen, PGY-3  d/w Dr Ng      Update:     CBC reviewed  Slight increased in WBC.  Will c/t monitor.    Amyeo Afroz Jereen, PGY-3  d/w Dr Ng

## 2024-02-12 PROCEDURE — 88307 TISSUE EXAM BY PATHOLOGIST: CPT | Mod: 26

## 2024-02-12 PROCEDURE — 59514 CESAREAN DELIVERY ONLY: CPT | Mod: AS,U8

## 2024-02-12 DEVICE — INTERCEED 3 X 4": Type: IMPLANTABLE DEVICE | Status: FUNCTIONAL

## 2024-02-12 RX ORDER — OXYTOCIN 10 UNIT/ML
333.33 VIAL (ML) INJECTION
Qty: 20 | Refills: 0 | Status: DISCONTINUED | OUTPATIENT
Start: 2024-02-12 | End: 2024-02-13

## 2024-02-12 RX ORDER — VANCOMYCIN HCL 1 G
VIAL (EA) INTRAVENOUS
Refills: 0 | Status: DISCONTINUED | OUTPATIENT
Start: 2024-02-12 | End: 2024-02-12

## 2024-02-12 RX ORDER — DIPHENHYDRAMINE HCL 50 MG
25 CAPSULE ORAL EVERY 6 HOURS
Refills: 0 | Status: DISCONTINUED | OUTPATIENT
Start: 2024-02-12 | End: 2024-02-16

## 2024-02-12 RX ORDER — METRONIDAZOLE 500 MG
500 TABLET ORAL ONCE
Refills: 0 | Status: COMPLETED | OUTPATIENT
Start: 2024-02-12 | End: 2024-02-12

## 2024-02-12 RX ORDER — SODIUM CHLORIDE 9 MG/ML
1000 INJECTION, SOLUTION INTRAVENOUS ONCE
Refills: 0 | Status: COMPLETED | OUTPATIENT
Start: 2024-02-12 | End: 2024-02-12

## 2024-02-12 RX ORDER — SODIUM CHLORIDE 9 MG/ML
500 INJECTION, SOLUTION INTRAVENOUS ONCE
Refills: 0 | Status: DISCONTINUED | OUTPATIENT
Start: 2024-02-12 | End: 2024-02-14

## 2024-02-12 RX ORDER — TETANUS TOXOID, REDUCED DIPHTHERIA TOXOID AND ACELLULAR PERTUSSIS VACCINE, ADSORBED 5; 2.5; 8; 8; 2.5 [IU]/.5ML; [IU]/.5ML; UG/.5ML; UG/.5ML; UG/.5ML
0.5 SUSPENSION INTRAMUSCULAR ONCE
Refills: 0 | Status: DISCONTINUED | OUTPATIENT
Start: 2024-02-12 | End: 2024-02-16

## 2024-02-12 RX ORDER — GENTAMICIN SULFATE 40 MG/ML
400 VIAL (ML) INJECTION ONCE
Refills: 0 | Status: DISCONTINUED | OUTPATIENT
Start: 2024-02-12 | End: 2024-02-12

## 2024-02-12 RX ORDER — SODIUM CHLORIDE 9 MG/ML
1000 INJECTION, SOLUTION INTRAVENOUS ONCE
Refills: 0 | Status: DISCONTINUED | OUTPATIENT
Start: 2024-02-12 | End: 2024-02-14

## 2024-02-12 RX ORDER — HEPARIN SODIUM 5000 [USP'U]/ML
5000 INJECTION INTRAVENOUS; SUBCUTANEOUS EVERY 12 HOURS
Refills: 0 | Status: DISCONTINUED | OUTPATIENT
Start: 2024-02-12 | End: 2024-02-16

## 2024-02-12 RX ORDER — ACETAMINOPHEN 500 MG
975 TABLET ORAL
Refills: 0 | Status: DISCONTINUED | OUTPATIENT
Start: 2024-02-12 | End: 2024-02-13

## 2024-02-12 RX ORDER — KETOROLAC TROMETHAMINE 30 MG/ML
30 SYRINGE (ML) INJECTION EVERY 6 HOURS
Refills: 0 | Status: DISCONTINUED | OUTPATIENT
Start: 2024-02-12 | End: 2024-02-13

## 2024-02-12 RX ORDER — SIMETHICONE 80 MG/1
80 TABLET, CHEWABLE ORAL EVERY 4 HOURS
Refills: 0 | Status: DISCONTINUED | OUTPATIENT
Start: 2024-02-12 | End: 2024-02-16

## 2024-02-12 RX ORDER — MAGNESIUM HYDROXIDE 400 MG/1
30 TABLET, CHEWABLE ORAL
Refills: 0 | Status: DISCONTINUED | OUTPATIENT
Start: 2024-02-12 | End: 2024-02-16

## 2024-02-12 RX ORDER — FAMOTIDINE 10 MG/ML
20 INJECTION INTRAVENOUS ONCE
Refills: 0 | Status: COMPLETED | OUTPATIENT
Start: 2024-02-12 | End: 2024-02-12

## 2024-02-12 RX ORDER — OXYCODONE HYDROCHLORIDE 5 MG/1
5 TABLET ORAL ONCE
Refills: 0 | Status: DISCONTINUED | OUTPATIENT
Start: 2024-02-12 | End: 2024-02-16

## 2024-02-12 RX ORDER — CEFAZOLIN SODIUM 1 G
2000 VIAL (EA) INJECTION EVERY 8 HOURS
Refills: 0 | Status: DISCONTINUED | OUTPATIENT
Start: 2024-02-12 | End: 2024-02-12

## 2024-02-12 RX ORDER — SODIUM CHLORIDE 9 MG/ML
1000 INJECTION, SOLUTION INTRAVENOUS
Refills: 0 | Status: DISCONTINUED | OUTPATIENT
Start: 2024-02-12 | End: 2024-02-13

## 2024-02-12 RX ORDER — OXYCODONE HYDROCHLORIDE 5 MG/1
5 TABLET ORAL
Refills: 0 | Status: DISCONTINUED | OUTPATIENT
Start: 2024-02-12 | End: 2024-02-16

## 2024-02-12 RX ORDER — CITRIC ACID/SODIUM CITRATE 300-500 MG
30 SOLUTION, ORAL ORAL ONCE
Refills: 0 | Status: COMPLETED | OUTPATIENT
Start: 2024-02-12 | End: 2024-02-12

## 2024-02-12 RX ORDER — VANCOMYCIN HCL 1 G
1500 VIAL (EA) INTRAVENOUS ONCE
Refills: 0 | Status: DISCONTINUED | OUTPATIENT
Start: 2024-02-12 | End: 2024-02-12

## 2024-02-12 RX ORDER — IBUPROFEN 200 MG
600 TABLET ORAL EVERY 6 HOURS
Refills: 0 | Status: COMPLETED | OUTPATIENT
Start: 2024-02-12 | End: 2025-01-10

## 2024-02-12 RX ORDER — LANOLIN
1 OINTMENT (GRAM) TOPICAL EVERY 6 HOURS
Refills: 0 | Status: DISCONTINUED | OUTPATIENT
Start: 2024-02-12 | End: 2024-02-16

## 2024-02-12 RX ORDER — VANCOMYCIN HCL 1 G
1500 VIAL (EA) INTRAVENOUS EVERY 8 HOURS
Refills: 0 | Status: DISCONTINUED | OUTPATIENT
Start: 2024-02-12 | End: 2024-02-12

## 2024-02-12 RX ORDER — METRONIDAZOLE 500 MG
500 TABLET ORAL EVERY 8 HOURS
Refills: 0 | Status: DISCONTINUED | OUTPATIENT
Start: 2024-02-12 | End: 2024-02-12

## 2024-02-12 RX ORDER — METRONIDAZOLE 500 MG
TABLET ORAL
Refills: 0 | Status: DISCONTINUED | OUTPATIENT
Start: 2024-02-12 | End: 2024-02-12

## 2024-02-12 RX ORDER — SODIUM CHLORIDE 9 MG/ML
1000 INJECTION, SOLUTION INTRAVENOUS
Refills: 0 | Status: DISCONTINUED | OUTPATIENT
Start: 2024-02-12 | End: 2024-02-14

## 2024-02-12 RX ORDER — GENTAMICIN SULFATE 40 MG/ML
400 VIAL (ML) INJECTION ONCE
Refills: 0 | Status: COMPLETED | OUTPATIENT
Start: 2024-02-12 | End: 2024-02-12

## 2024-02-12 RX ORDER — OXYTOCIN 10 UNIT/ML
333.33 VIAL (ML) INJECTION
Qty: 20 | Refills: 0 | Status: COMPLETED | OUTPATIENT
Start: 2024-02-12 | End: 2024-02-12

## 2024-02-12 RX ADMIN — FAMOTIDINE 20 MILLIGRAM(S): 10 INJECTION INTRAVENOUS at 09:37

## 2024-02-12 RX ADMIN — Medication 975 MILLIGRAM(S): at 20:52

## 2024-02-12 RX ADMIN — Medication 100 MILLIGRAM(S): at 09:38

## 2024-02-12 RX ADMIN — Medication 1000 MILLIUNIT(S)/MIN: at 14:10

## 2024-02-12 RX ADMIN — SODIUM CHLORIDE 75 MILLILITER(S): 9 INJECTION, SOLUTION INTRAVENOUS at 14:10

## 2024-02-12 RX ADMIN — Medication 30 MILLIGRAM(S): at 18:57

## 2024-02-12 RX ADMIN — Medication 30 MILLIGRAM(S): at 18:27

## 2024-02-12 RX ADMIN — HEPARIN SODIUM 5000 UNIT(S): 5000 INJECTION INTRAVENOUS; SUBCUTANEOUS at 18:25

## 2024-02-12 RX ADMIN — SODIUM CHLORIDE 2000 MILLILITER(S): 9 INJECTION, SOLUTION INTRAVENOUS at 09:00

## 2024-02-12 RX ADMIN — Medication 30 MILLILITER(S): at 09:37

## 2024-02-12 RX ADMIN — Medication 500 MILLIGRAM(S): at 09:42

## 2024-02-12 RX ADMIN — Medication 975 MILLIGRAM(S): at 21:22

## 2024-02-12 RX ADMIN — Medication 300 MILLIGRAM(S): at 06:11

## 2024-02-12 NOTE — PROCEDURAL SAFETY CHECKLIST WITH OR WITHOUT SEDATION - NSGUIDEWIRESREMOVEDSD_GEN_ALL_CORE
Behavorial Health Outpatient Intake Questions    Referred by: Self     Please advised interviewee that they need to answer all questions truthfully to allow for best care and any misrepresentations of information may affect their ability to be seen at this clinic   => Was this discussed? Yes     Behavorial Health Outpatient Intake History -     Presenting Problem (in patient's words): Patient has anxiety around new people and sudden changes  Large groups of people  It has gotten worse since home-schooling started and last week she came up to her father crying and asked if she could talk to someone  She is sad a lot, and is having a hard time making friends  Patient has an IEP at school  Are there any developmental disabilities? ? If yes, can they speak to you on the phone? If they are too limited to speak to you on phone, refer out No    Are you taking any psychiatric medications? No    => If yes, who prescribes? If yes, are they injectable medications? Does the patient have a language barrier or hearing impairment? No    Have you been treated at [facility] by a therapist or a doctor in the past? If yes, who? No    Has the patient been hospitalized for mental health? No   If yes, how long ago was last hospitalization and where was it? Do you actively use alcohol or marijuana or illegal substances? If yes, what and how much - refer out to Drug and alcohol treatment if use is excessive or daily use of illegal substances No concerns of substance abuse are reported  Do you have a community treatment team or ? No    Legal History-     Does the patient have any history of arrests, intermediate/halfway time, or DUIs? No  If Yes-  1) What types of charges? 2) When were they last incarcerated? 3) Are they currently on parole or probation? Minor Child-    Who has custody of the child? Mom and Dad    Is there a custody agreement?  No    If there is a custody agreement remind parent that they must bring a copy to the first appt or they will not be seen  Intake Team, please check with provider before scheduling if flags come up such as:  - complex case  - legal history (other than DUI)  - communication barrier concerns are present  - if, in your judgment, this needs further review    ACCEPTED as a patient Yes  => Appointment Date: Monday, April 12th at 5:00pm with Twyla Like    Referred Elsewhere? No    Name of Insurance Co: [facility] BS Plan [id number]  Insurance ID# [id number]  Insurance Phone #  If ins is primary or secondary  If patient is a minor, parents information such as Name, D  O B of guarantor   [facility] 2/28/1970 done

## 2024-02-12 NOTE — BRIEF OPERATIVE NOTE - OPERATION/FINDINGS
Viable female infant, apgar 8/9, weight 1675 gms  delivered @10:42  cephalic presentation, clear copious fluid noted  hysterotomy closed in single layer using Vicryl suture,   dense adhesions of rectus muscle to the peritoneum and omentum   rectus layer closed, fascia layer closed using Vicryl suture  otherwise grossly normal uterus, tubes & ovaries  Interceed was placed over the hysterotomy  Plan to continue Vancomycin, Gentamycin and Flagyl.  Placental culture collected and sent.   QBL: 546 cc  UOP: 775 cc  IVF: 2400 cc  Dictation Number: [id number]

## 2024-02-12 NOTE — OB RN DELIVERY SUMMARY - NS_SEPSISRSKCALC_OBGYN_ALL_OB_FT
No temperature has been documented for this patient in CPN or on the OB Flowsheet. Ensure the highest temperature during labor was documented on the OB Flowsheet.  No gestational age at birth has been documented. Ensure delivery date/time has been entered above.  Rupture of membranes must be entered above.   Unable to calculate the EOS score due to gestational age being less than 34 weeks or more than 43 weeks.

## 2024-02-12 NOTE — OB PROVIDER DELIVERY SUMMARY - NSSELHIDDEN_OBGYN_ALL_OB_FT
[NS_DeliveryAttending1_OBGYN_ALL_OB_FT:[id number]],[NS_DeliveryRN_OBGYN_ALL_OB_FT:[id number]]

## 2024-02-12 NOTE — OB RN DELIVERY SUMMARY - AS DELIV COMPLICATIONS OB
shoulder dystocia/premature rupture of membranes prior to labor premature rupture of membranes prior to labor

## 2024-02-12 NOTE — OB RN PREOPERATIVE CHECKLIST - ISOLATION PRECAUTIONS
this, the surrounding tunnel tissues looked  partially necrotic. One could not rule out an infectious process. The  frozen section did show chronic inflammation but no polys for acute  inflammation. This area was thoroughly debrided, the fragmented screw  remains removed, the tissues about the tunnel removed and this did leave  an approximately 10 mm tunnel defect. It is imperative that final  cultures be obtained prior to any other operative procedure,  particularly the extensive medial and lateral releases and accordingly  the remaining part of the operative procedure was canceled. OPERATIVE PROCEDURE:  This patient was placed in supine position upon  the operating room table and after satisfactory level of general  anesthesia, the right knee was prepped and draped to sterile surgical  field after identification of the signed limb and the timeout procedure. Under tourniquet control, a 5 cm incision was made with dissection  carried through the skin and subcutaneous tissue. At this point, the  decision was made to proceed with exploration of the prominent hardware  at the tibial tunnel entrance. Upon opening this it was found that  there was a yellowish fluid present and marked fragmentation of the  interference screw as previously dictated. At this point, the decision  was made that no further surgery could be performed because the  appearance was certainly not pristine tissue and the chance of infection  could not be excluded. It should be noted that the majority of the  cystic reaction to interference absorbable screw is usually nonsterile,  however, the tissues in this case really prevented consideration that  this was entirely sterile. Accordingly, this entire area was debrided. Cultures were sent x2 for anaerobic, aerobic and to be held 21 days. Gram-stain was also sent. Frozen section indicated chronic inflammatory  cells but no evidence for acute inflammatory cells.   Accordingly, this area was irrigated with vancomycin and chlorhexidine. The wound was  closed with interrupted 3-0 and 4-0 Monocryl. The soft tissues about  the 10 mm defect was closed so as to close the area. An iodoform gauze  was placed into the area as a precaution which will be pulled in 48  hours after inspection of the wound. With the frozen section not  showing any acute inflammatory cells, this would certainly indicate a  predominance evidence that this represents a sterile cystic reaction to  the interference screw. The patient tolerated the procedure well and  she was placed in Ace cotton compression dressing, returned to recovery  room in excellent condition.   Estimated blood loss Wil Ramirez MD    D: 05/12/2021 11:19:31       T: 05/12/2021 11:28:31     [email or web address]  Job#: [id number]     Doc#: [id number]    CC: none

## 2024-02-12 NOTE — OB PROVIDER DELIVERY SUMMARY - NSPROVIDERDELIVERYNOTE_OBGYN_ALL_OB_FT
Viable female infant, apgar 8/9, weight 1675 gms  delivered @10:42  cephalic presentation, clear copious fluid noted  hysterotomy closed in single layer using Vicryl suture,   dense adhesions of rectus muscle to the peritoneum and omentum   rectus layer closed, fascia layer closed using Vicryl suture  otherwise grossly normal uterus, tubes & ovaries  Interceed was placed over the hysterotomy  Plan to continue Vancomycin, Gentamycin and Flagyl.  Placental culture collected and sent.   QBL: 546 cc  UOP: 775 cc  IVF: 2400 cc  Dictation Number: [id number]

## 2024-02-12 NOTE — PROGRESS NOTE ADULT - SUBJECTIVE AND OBJECTIVE BOX
Patient seen and examined at bedside. Pt reports increase in painful contractions overnight, that improved with repositioning and IVF. This morning around 5a, pt reports return of now regular painful abdominal cramps. Reports continued leakage of clear fluid. No fever or chills. + FM     Vital Signs Last 24 Hours  T(C): 36.8 (02-12-24 @ 05:16), Max: 36.8 (02-12-24 @ 05:16)  HR: 84 (02-12-24 @ 05:16) (80 - 93)  BP: 102/58 (02-12-24 @ 05:16) (98/54 - 119/72)  RR: 17 (02-12-24 @ 05:16) (15 - 17)  SpO2: 96% (02-12-24 @ 05:16) (96% - 98%)    CAPILLARY BLOOD GLUCOSE      Physical Exam:  General: NAD  Abdomen: Soft, +fundal tenderness, new in onset. Remainder of the abdomen is not-tender, gravid  Ext: No pain or swelling  Speculum: Copious amounts of clear fluid, visualized to be 1cm   VE: 0.5 / 70 / -3   Krista on the monitor  q5 this AM.         Labs:             11.7   13.90 )-----------( 189      ( 02-11 @ 19:00 )             35.5               MEDICATIONS  (STANDING):  chlorhexidine 2% Cloths 1 Application(s) Topical daily  citric acid/sodium citrate Solution 30 milliLiter(s) Oral once  clindamycin   Capsule 300 milliGRAM(s) Oral every 8 hours  famotidine    Tablet 20 milliGRAM(s) Oral daily  famotidine Injectable 20 milliGRAM(s) IV Push once  influenza   Vaccine 0.5 milliLiter(s) IntraMuscular once  lactated ringers Bolus 1000 milliLiter(s) IV Bolus once  lactated ringers. 1000 milliLiter(s) (125 mL/Hr) IV Continuous <Continuous>  oxytocin Infusion 333.333 milliUNIT(s)/Min (1000 mL/Hr) IV Continuous <Continuous>  prenatal multivitamin 1 Tablet(s) Oral daily    MEDICATIONS  (PRN):  acetaminophen     Tablet .. 975 milliGRAM(s) Oral every 6 hours PRN Temp greater or equal to 38C (100.4F), Moderate Pain (4 - 6)  calcium carbonate    500 mG (Tums) Chewable 2 Tablet(s) Chew every 6 hours PRN Heartburn   Patient seen and examined at bedside. Pt reports increase in painful contractions overnight, that improved with repositioning and IVF. This morning around 5a, pt reports return of now regular painful abdominal cramps. Reports continued leakage of clear fluid. No fever or chills. + FM     Vital Signs Last 24 Hours  T(C): 36.8 (02-12-24 @ 05:16), Max: 36.8 (02-12-24 @ 05:16)  HR: 84 (02-12-24 @ 05:16) (80 - 93)  BP: 102/58 (02-12-24 @ 05:16) (98/54 - 119/72)  RR: 17 (02-12-24 @ 05:16) (15 - 17)  SpO2: 96% (02-12-24 @ 05:16) (96% - 98%)    CAPILLARY BLOOD GLUCOSE      Physical Exam:  General: NAD  Abdomen: Soft, +fundal tenderness, new in onset. Remainder of the abdomen is not-tender, gravid  Ext: No pain or swelling  Speculum: Copious amounts of clear fluid, visualized to be 1cm   VE: 0.5 / 70 / -3   Krista on the monitor  q5 this AM.         Labs:             11.7   13.90 )-----------( 189      ( 02-11 @ 19:00 )             35.5           MEDICATIONS  (STANDING):  chlorhexidine 2% Cloths 1 Application(s) Topical daily  citric acid/sodium citrate Solution 30 milliLiter(s) Oral once  clindamycin   Capsule 300 milliGRAM(s) Oral every 8 hours  famotidine    Tablet 20 milliGRAM(s) Oral daily  famotidine Injectable 20 milliGRAM(s) IV Push once  influenza   Vaccine 0.5 milliLiter(s) IntraMuscular once  lactated ringers Bolus 1000 milliLiter(s) IV Bolus once  lactated ringers. 1000 milliLiter(s) (125 mL/Hr) IV Continuous <Continuous>  oxytocin Infusion 333.333 milliUNIT(s)/Min (1000 mL/Hr) IV Continuous <Continuous>  prenatal multivitamin 1 Tablet(s) Oral daily    MEDICATIONS  (PRN):  acetaminophen     Tablet .. 975 milliGRAM(s) Oral every 6 hours PRN Temp greater or equal to 38C (100.4F), Moderate Pain (4 - 6)  calcium carbonate    500 mG (Tums) Chewable 2 Tablet(s) Chew every 6 hours PRN Heartburn

## 2024-02-12 NOTE — BRIEF OPERATIVE NOTE - NSICDXBRIEFPREOP_GEN_ALL_CORE_FT
PRE-OP DIAGNOSIS:   premature rupture of membranes (PPROM) with unknown onset of labor 2024 12:53:41  Sigrid Whitlock  H/O  section 2024 12:53:47  Sigrid Whitlock

## 2024-02-12 NOTE — OB RN INTRAOPERATIVE NOTE - NSSELHIDDEN_OBGYN_ALL_OB_FT
[NS_DeliveryAttending1_OBGYN_ALL_OB_FT:[id number]],[NS_DeliveryRN_OBGYN_ALL_OB_FT:[id number]]

## 2024-02-12 NOTE — OB NEONATOLOGY/PEDIATRICIAN DELIVERY SUMMARY - NSPEDSNEONOTESA_OBGYN_ALL_OB_FT
NICU Resus called to OR for premature infant of 33wks. 33.3wk female born via unscheduled repeat CS for chorioamnionitis, to a 38 y/o  mother.  Maternal medical/surgical/pregnancy history of PPROM on  (32w6d) complicated by chorioamnionitis receiving IV clindamycin, s/p Gentamicin (-). Received Betamethasone (-). Maternal labs include Blood Type A+, HIV - , RPR NR , Rubella I , Hep B - , GBS negative on 24. PPROM at 0815 on 24 with clear fluids (ROM hours: 98H 27M).  Baby emerged vigorous, crying, was warmed, dried, suctioned, and stimulated with APGARS of 8/9. Resuscitation included: CPAP started around 2-3 MOL for work of breathing, desaturations. Max settings 5/25%. Transitioned to bCAP 5/21% and transported to NICU. Mom plans to initiate breastfeeding, consents to Hep B vaccine.  Highest maternal temp: 37.2. Admitted to NICU.    Physical Exam:  Gen: NAD, +grimace  HEENT: anterior fontanel open soft and flat, no cleft lip/palate, ears normal set, no ear pits or tags. no lesions in mouth/throat, nares clinically patent  Resp: no increased work of breathing, good air entry b/l, clear to auscultation bilaterally  Cardio: Normal S1/S2, regular rate and rhythm, no murmurs, rubs or gallops  Abd: soft, non tender, non distended, + bowel sounds, umbilical cord with 3 vessels  Neuro: +grasp/suck/karlee, normal tone  Extremities: moving all extremities, full range of motion x 4, no crepitus  Skin: pink, warm  Genitals: Normal female anatomy, Levi 1, anus patent NICU Resus called to OR for premature infant of 33wks. 33.3wk female born via unscheduled repeat CS for chorioamnionitis, to a 36 y/o  mother.  Maternal medical/surgical/pregnancy history of PPROM on  (32w6d) complicated by chorioamnionitis receiving IV clindamycin, s/p Gentamicin (-). Received Betamethasone (-). Maternal labs include Blood Type A+, HIV - , RPR NR , Rubella I , Hep B - , GBS negative on 24. PPROM at 0815 on 24 with clear fluids (ROM hours: 98H 27M).  Baby emerged vigorous, crying, was warmed, dried, suctioned, and stimulated with APGARS of 8/9. Resuscitation included: CPAP started around 2-3 MOL for work of breathing, desaturations. Max settings 5/25%. Transitioned to bCAP 5/21% and transported to NICU. Mom plans to initiate breastfeeding, consents to Hep B vaccine.  Highest maternal temp: 37.2. Admitted to NICU.    Physical Exam:  Gen: NAD, +grimace  HEENT: anterior fontanel open soft and flat, no cleft lip/palate, ears normal set, no ear pits or tags. No lesions in mouth/throat, nares clinically patent.   Resp: +Tachypneic, nasal flaring, subcostal retractions, CPAP mask in place; good air entry b/l  Cardio: Normal S1/S2, regular rate and rhythm, no murmurs, rubs or gallops  Abd: soft, non tender, non distended, + bowel sounds, umbilical cord with 3 vessels  Neuro: +grasp/suck/karlee, normal tone  Extremities: moving all extremities, full range of motion x 4, no crepitus  Skin: pink, warm  Genitals: Normal female anatomy, Levi 1, anus patent

## 2024-02-12 NOTE — OB PROVIDER DELIVERY SUMMARY - NSLOWPPHRISK_OBGYN_A_OB
Nagel Pregnancy/Less than or equal to 4 previous vaginal births/No known bleeding disorder/No history of postpartum hemorrhage

## 2024-02-12 NOTE — BRIEF OPERATIVE NOTE - NSICDXBRIEFPOSTOP_GEN_ALL_CORE_FT
POST-OP DIAGNOSIS:  Delivery by  section using transverse incision of lower segment of uterus 2024 12:54:02  Sigrid Whitlock

## 2024-02-12 NOTE — OB RN DELIVERY SUMMARY - NS_SCRUBTECH_OBGYN_ALL_OB_FT
[FreeTextEntry1] : Maintain on current medical regimen.  Ambulate as tolerated.  Maintain low sodium, low caloric, low cholesterol diet. Weight loss encouraged.\par Regular INRs.  Maintain INR 2.0-2.5\par 
BEATRIZ Arroyo

## 2024-02-12 NOTE — OB RN DELIVERY SUMMARY - NSSELHIDDEN_OBGYN_ALL_OB_FT
[NS_DeliveryAttending1_OBGYN_ALL_OB_FT:[id number]],[NS_DeliveryRN_OBGYN_ALL_OB_FT:[id number]]

## 2024-02-12 NOTE — PROGRESS NOTE ADULT - ASSESSMENT
38y yo  at 33w3d admitted with PPROM at 32w6d, confirmed with gross pooling on speculum exam. This morning patient with new onset fundal tenderness and rising leukocytosis. Concern for chorioamnionitis. Plan for repeat C/S this AM.     #PPROM  - s/p azithro x1 (), gent (-)  - c/w clinda   - alternative abx regimen given hx of PCN allergy (rash)  - WBC 7.97 on admission, increased yesturday to 11. Repeat this AM.   - NST BID/BPP 2x/week      #?Fetal arrhythmia  - fetal arrythmia heard by admitting provider, no previous hx  - no arrythmia on subsequent tracings    #Maternal wellbeing  - NPO. Hold SQH.     #Fetal wellbeing  - s/p NICU consult.  - s/p BMX (-)       Inna García,

## 2024-02-13 LAB
BASOPHILS # BLD AUTO: 0.06 K/UL — SIGNIFICANT CHANGE UP (ref 0–0.2)
BASOPHILS NFR BLD AUTO: 0.4 % — SIGNIFICANT CHANGE UP (ref 0–2)
EOSINOPHIL # BLD AUTO: 0.09 K/UL — SIGNIFICANT CHANGE UP (ref 0–0.5)
EOSINOPHIL NFR BLD AUTO: 0.6 % — SIGNIFICANT CHANGE UP (ref 0–6)
HBV SURFACE AG SERPL QL IA: SIGNIFICANT CHANGE UP
HCT VFR BLD CALC: 31.6 % — LOW (ref 34.5–45)
HGB BLD-MCNC: 10.5 G/DL — LOW (ref 11.5–15.5)
HIV 1+2 AB+HIV1 P24 AG SERPL QL IA: SIGNIFICANT CHANGE UP
IANC: 12.03 K/UL — HIGH (ref 1.8–7.4)
IMM GRANULOCYTES NFR BLD AUTO: 2.9 % — HIGH (ref 0–0.9)
LYMPHOCYTES # BLD AUTO: 12.9 % — LOW (ref 13–44)
LYMPHOCYTES # BLD AUTO: 2.05 K/UL — SIGNIFICANT CHANGE UP (ref 1–3.3)
MCHC RBC-ENTMCNC: 29.1 PG — SIGNIFICANT CHANGE UP (ref 27–34)
MCHC RBC-ENTMCNC: 33.2 GM/DL — SIGNIFICANT CHANGE UP (ref 32–36)
MCV RBC AUTO: 87.5 FL — SIGNIFICANT CHANGE UP (ref 80–100)
MONOCYTES # BLD AUTO: 1.26 K/UL — HIGH (ref 0–0.9)
MONOCYTES NFR BLD AUTO: 7.9 % — SIGNIFICANT CHANGE UP (ref 2–14)
NEUTROPHILS # BLD AUTO: 12.03 K/UL — HIGH (ref 1.8–7.4)
NEUTROPHILS NFR BLD AUTO: 75.3 % — SIGNIFICANT CHANGE UP (ref 43–77)
NRBC # BLD: 0 /100 WBCS — SIGNIFICANT CHANGE UP (ref 0–0)
NRBC # FLD: 0 K/UL — SIGNIFICANT CHANGE UP (ref 0–0)
PLATELET # BLD AUTO: 185 K/UL — SIGNIFICANT CHANGE UP (ref 150–400)
RBC # BLD: 3.61 M/UL — LOW (ref 3.8–5.2)
RBC # FLD: 13 % — SIGNIFICANT CHANGE UP (ref 10.3–14.5)
RUBV IGG SER-ACNC: 3.1 INDEX — SIGNIFICANT CHANGE UP
RUBV IGG SER-IMP: POSITIVE — SIGNIFICANT CHANGE UP
WBC # BLD: 15.95 K/UL — HIGH (ref 3.8–10.5)
WBC # FLD AUTO: 15.95 K/UL — HIGH (ref 3.8–10.5)

## 2024-02-13 RX ORDER — ACETAMINOPHEN 500 MG
650 TABLET ORAL EVERY 6 HOURS
Refills: 0 | Status: DISCONTINUED | OUTPATIENT
Start: 2024-02-13 | End: 2024-02-15

## 2024-02-13 RX ORDER — IBUPROFEN 200 MG
600 TABLET ORAL EVERY 6 HOURS
Refills: 0 | Status: DISCONTINUED | OUTPATIENT
Start: 2024-02-13 | End: 2024-02-16

## 2024-02-13 RX ADMIN — Medication 1 TABLET(S): at 12:12

## 2024-02-13 RX ADMIN — FAMOTIDINE 20 MILLIGRAM(S): 10 INJECTION INTRAVENOUS at 00:09

## 2024-02-13 RX ADMIN — Medication 1 TABLET(S): at 22:26

## 2024-02-13 RX ADMIN — Medication 975 MILLIGRAM(S): at 09:45

## 2024-02-13 RX ADMIN — Medication 30 MILLIGRAM(S): at 00:40

## 2024-02-13 RX ADMIN — Medication 1 TABLET(S): at 23:00

## 2024-02-13 RX ADMIN — HEPARIN SODIUM 5000 UNIT(S): 5000 INJECTION INTRAVENOUS; SUBCUTANEOUS at 17:52

## 2024-02-13 RX ADMIN — Medication 975 MILLIGRAM(S): at 16:20

## 2024-02-13 RX ADMIN — Medication 650 MILLIGRAM(S): at 20:12

## 2024-02-13 RX ADMIN — Medication 30 MILLIGRAM(S): at 05:48

## 2024-02-13 RX ADMIN — Medication 600 MILLIGRAM(S): at 17:52

## 2024-02-13 RX ADMIN — Medication 30 MILLIGRAM(S): at 06:59

## 2024-02-13 RX ADMIN — Medication 975 MILLIGRAM(S): at 15:15

## 2024-02-13 RX ADMIN — Medication 975 MILLIGRAM(S): at 03:05

## 2024-02-13 RX ADMIN — Medication 975 MILLIGRAM(S): at 08:34

## 2024-02-13 RX ADMIN — Medication 30 MILLIGRAM(S): at 00:10

## 2024-02-13 RX ADMIN — HEPARIN SODIUM 5000 UNIT(S): 5000 INJECTION INTRAVENOUS; SUBCUTANEOUS at 05:48

## 2024-02-13 RX ADMIN — Medication 30 MILLIGRAM(S): at 13:10

## 2024-02-13 RX ADMIN — Medication 30 MILLIGRAM(S): at 12:12

## 2024-02-13 RX ADMIN — Medication 650 MILLIGRAM(S): at 20:42

## 2024-02-13 RX ADMIN — Medication 975 MILLIGRAM(S): at 04:00

## 2024-02-13 NOTE — PROGRESS NOTE ADULT - SUBJECTIVE AND OBJECTIVE BOX
POST OP DAY  1  s/p   SECTION    SUBJECTIVE:  I'm ok. I have some neck stiffness.     PAIN SCALE SCORE: [x] Refer to charted pain scores    THERAPY:  [ x ] Spinal morphine   [  ] Epidural morphine   [  ] IV PCA Hydromorphone 1 mg/ml    OBJECTIVE:  Comfortable Appearing    SEDATION SCORE:	  [ x ] Alert	    [  ] Drowsy        [  ] Arousable	[  ] Asleep	[  ] Unresponsive    Side Effects:	  [  ] None	     [  ] Nausea        [  ] Pruritus        [  ] Weakness   [  ] Numbness        ASSESSMENT/ PLAN   [   ] Discontinue         [  ] Continue    [ x ] Change to prn Analgesics as per primary service.    DOCUMENTATION & VERIFICATION OF CURRENT MEDS [ x ] Done    COMMENTS: Patient has neck stiffness and a headache

## 2024-02-13 NOTE — PROGRESS NOTE ADULT - ASSESSMENT
Called to see the patient with a possble postdural puncture headache (PDPH).  The patient is s/p a CSE on 2/12/24    She complains of a positional headache and neck ache relieved when supine and exacerbated when sitting upright.  Her symptoms started on 2/13/2024           She denies any other symptoms including visual or auditory changes, photophobia, nausea, numbness weakness and incontinence.    PE:  Pt sitting upright   T(C): 36.5 (02-13-24 @ 15:15), Max: 37.2 (02-13-24 @ 09:35)  HR: 82 (02-13-24 @ 18:00) (68 - 84)  BP: 106/69 (02-13-24 @ 18:00) (102/60 - 118/74)  RR: 16 (02-13-24 @ 15:15) (16 - 19)  SpO2: 100% (02-13-24 @ 15:15) (98% - 100%)  Neck with FROM.    A/P: Likely PDPH.  Conservative therapy (Continuing analgesics, caffeine, hydration) and the natural progression of a PDPH (typically peaks in 48hours and 70% resolve by one week) discussed with the patient.  The option of an epidural blood patch (EBP) was discussed as well including the procedure and the risks including a new dural puncture possibly perpetuating her symptoms (1-2%), prolonged (weeks) lower back discomfort, and rare nerve injury. The success and failure rate of an EBP was discussed as complete relief in over half of pts, partial relief in an additional 20-35%, and a complete failure in ~10-15%.    Patient opting for conservative management with PO fluid hydration, and fiorocet.    Patient to be re-assessed by the anesthesia team on 2/14.

## 2024-02-13 NOTE — PROGRESS NOTE ADULT - SUBJECTIVE AND OBJECTIVE BOX
Patient seen and examined at bedside, no acute overnight events. No acute complaints. Denies any lightheadedness dizziness, HA, epigastric pain, blurred vision, CP, SOB, N/V, fevers, and chills or calf pain. Ambulating, voiding and eating regular food without any difficulty. Passed flatus. Minimal lochia. Pain is controlled.   VSS  CV RRR  Resp CTA  Abd soft, nt, nd, , +bs, incision c/d/i  Ext from, nt, no edema    A.P    38y yo  s/p RCS at 33w3d due to PPROM at 32w6d. Pt is doing well and meeting milestones. s/p empiric tx for chorioamnionitis vss stable. labs stable.   Cont POD care  Encourage ambulation, breastfeeding and ISS use  Defer contraception for PPD visit  Anticipate discharge in 2 days

## 2024-02-14 RX ADMIN — Medication 1 TABLET(S): at 05:37

## 2024-02-14 RX ADMIN — Medication 600 MILLIGRAM(S): at 20:56

## 2024-02-14 RX ADMIN — HEPARIN SODIUM 5000 UNIT(S): 5000 INJECTION INTRAVENOUS; SUBCUTANEOUS at 05:37

## 2024-02-14 RX ADMIN — Medication 1 TABLET(S): at 12:15

## 2024-02-14 RX ADMIN — Medication 600 MILLIGRAM(S): at 15:57

## 2024-02-14 RX ADMIN — HEPARIN SODIUM 5000 UNIT(S): 5000 INJECTION INTRAVENOUS; SUBCUTANEOUS at 18:00

## 2024-02-14 RX ADMIN — Medication 600 MILLIGRAM(S): at 08:25

## 2024-02-14 RX ADMIN — Medication 600 MILLIGRAM(S): at 21:30

## 2024-02-14 RX ADMIN — Medication 600 MILLIGRAM(S): at 01:40

## 2024-02-14 RX ADMIN — Medication 1 TABLET(S): at 11:51

## 2024-02-14 RX ADMIN — Medication 600 MILLIGRAM(S): at 01:15

## 2024-02-14 RX ADMIN — Medication 600 MILLIGRAM(S): at 08:55

## 2024-02-14 RX ADMIN — Medication 600 MILLIGRAM(S): at 16:15

## 2024-02-14 RX ADMIN — Medication 1 TABLET(S): at 15:58

## 2024-02-14 RX ADMIN — Medication 1 TABLET(S): at 18:00

## 2024-02-14 RX ADMIN — Medication 1 TABLET(S): at 06:09

## 2024-02-14 NOTE — PROVIDER CONTACT NOTE (OTHER) - ACTION/TREATMENT ORDERED:
MD aware, no further orders given. MD visualized tracing midway.  Will continue to monitor.
re-assess pain 1 hr from the time tylenol given.
MD to f/u with anesthesiologist.

## 2024-02-14 NOTE — PROGRESS NOTE ADULT - ASSESSMENT
39yo  POD#2 s/p rLTCS. QBL: 546ml. Vitals stable. Pt continues to complain of headache and neck pain/stiffness that feels slightly better when laying down     #Postural headache  - Pt complaining of neck stiffness and headache  - seen by Anesthesia, started on Fiorcet (-)  - For possible blood patch today, pending pt's final decision  - Continue to reassess for progression symptoms PRN    #postpartum  -HCT: 35.3->31.6  - continue with PO analgesia  - increase ambulation  - continue regular diet  - encourage incentive spirometry    Faiza Macario PGY1   37yo  POD#2 s/p rLTCS. QBL: 546ml. Vitals stable. Pt continues to complain of headache and neck pain/stiffness that feels slightly better when laying down     #Postural headache  - Pt complaining of neck stiffness and headache  - seen by Anesthesia, started on Fiorcet (-)  - For possible blood patch today, pending pt's final decision  - Continue to reassess for progression symptoms PRN    #postpartum  -HCT: 35.3->31.6  - continue with PO analgesia  - increase ambulation  - continue regular diet  - encourage incentive spirometry    Faiza Macaroi PGY1    Agree with above findings. Denies any lightheadedness dizziness, HA, epigastric pain, blurred vision, CP, SOB, N/V, fevers, and chills or calf pain. Ambulating, voiding and eating regular food without any difficulty. Passed flatus. Minimal lochia. Pain is controlled. VSS. Incision c/d/i/. 38y yo  s/p RCS at 33w3d due to PPROM at 32w6d. Pt is doing well and meeting milestones. s/p empiric tx for chorioamnionitis vss stable. labs stable.   Cont POD care  Encourage ambulation, breastfeeding and ISS use  Defer contraception for PPD visit  Spinal HA. supportive management for now. pt wants to defer blood patch for now  Anticipate discharge in 2 days  Dixie Ng MD MPH

## 2024-02-14 NOTE — PROVIDER CONTACT NOTE (OTHER) - ASSESSMENT
pt crying complaining of severe neck pain. pt able to move neck freely. pt denies vision changes, dizziness.
Pt AOx3, VSS.  Pt c/o 2/10 pain mild cramping. Pt placed on monitor for evaluation.
BP - 107/75, HR-71. pt denies lightheadedness/ dizziness/ epigastric pain / nausea /vomiting/ blurry vision

## 2024-02-14 NOTE — PROVIDER CONTACT NOTE (OTHER) - BACKGROUND
r c/s 2/12 @ 1042am   hx. neck stiffness starting on 2/12 @ 11pm started on ESCIG Q6hr to help relieve pain.
repeat c/s from 2/12/24 @ 1042. PPROM 2/8 . pt has chorio.
PPROM 2/8 clear,

## 2024-02-14 NOTE — PROVIDER CONTACT NOTE (OTHER) - SITUATION
when pt returned from NICU, pt c/o 10/10 headache and neck pain. pt said it felt like a "stiff neck". tylenol given for pain. warm pack given for neck. pt encouraged to rest/ sleep/ drink fluids
Pt c/o mild abdominal cramping.  Amniotic fluid clear and now with light pink discharge.
pt complaining of severe neck stiffness/pain

## 2024-02-14 NOTE — PROGRESS NOTE ADULT - SUBJECTIVE AND OBJECTIVE BOX
37yo  POD#2 s/p rLTCS  Patient seen and examined at bedside, no acute overnight events. No acute complaints, pain well controlled. Patient is ambulating, voiding spontaneously, passing flatus, and tolerating regular diet. Denies CP, SOB, N/V, HA, blurred vision, epigastric pain.    Vital Signs Last 24 Hours  T(C): 36.7 (24 @ 06:00), Max: 37.2 (24 @ 09:35)  HR: 67 (24 @ 06:00) (67 - 84)  BP: 127/87 (24 @ 06:00) (102/60 - 127/87)  RR: 19 (24 @ 06:00) (16 - 19)  SpO2: 100% (24 @ 06:00) (98% - 100%)    Physical Exam:  General: NAD  Abdomen: Soft, expected tenderness, non-distended, fundus firm  Incision: Pfannenstiel incision CDI  Pelvic: Lochia wnl    Labs:    Blood Type: A Positive  Antibody Screen: Negative  RPR: Negative               10.5   15.95 )-----------( 185      (  @ 06:25 )             31.6                11.7   13.90 )-----------( 189      (  @ 19:00 )             35.5                11.4   7.97  )-----------( 179      ( 08 @ 10:22 )             33.4         MEDICATIONS  (STANDING):  acetaminophen 325 mG/butalbital 50 mG/caffeine 40 mG 1 Tablet(s) Oral every 6 hours  diphtheria/tetanus/pertussis (acellular) Vaccine (Adacel) 0.5 milliLiter(s) IntraMuscular once  famotidine    Tablet 20 milliGRAM(s) Oral daily  heparin   Injectable 5000 Unit(s) SubCutaneous every 12 hours  ibuprofen  Tablet. 600 milliGRAM(s) Oral every 6 hours  influenza   Vaccine 0.5 milliLiter(s) IntraMuscular once  lactated ringers Bolus 500 milliLiter(s) IV Bolus once  lactated ringers Bolus 1000 milliLiter(s) IV Bolus once  lactated ringers. 1000 milliLiter(s) (75 mL/Hr) IV Continuous <Continuous>  prenatal multivitamin 1 Tablet(s) Oral daily    MEDICATIONS  (PRN):  acetaminophen     Tablet .. 650 milliGRAM(s) Oral every 6 hours PRN Moderate Pain (4 - 6)  calcium carbonate    500 mG (Tums) Chewable 2 Tablet(s) Chew every 6 hours PRN Heartburn  diphenhydrAMINE 25 milliGRAM(s) Oral every 6 hours PRN Pruritus  lanolin Ointment 1 Application(s) Topical every 6 hours PRN Sore Nipples  magnesium hydroxide Suspension 30 milliLiter(s) Oral two times a day PRN Constipation  oxyCODONE    IR 5 milliGRAM(s) Oral every 3 hours PRN Moderate to Severe Pain (4-10)  oxyCODONE    IR 5 milliGRAM(s) Oral once PRN Moderate to Severe Pain (4-10)  simethicone 80 milliGRAM(s) Chew every 4 hours PRN Gas

## 2024-02-14 NOTE — PROGRESS NOTE ADULT - SUBJECTIVE AND OBJECTIVE BOX
Patient seen and examined at bedside. Patient is s/p  under CSE on 24, postop day 2. Patient still complaining of positional headache and neck stiffness, worse when sitting, standing or moving around, better when resting and laying down. States neck stiffness is worse today. States headache slightly improves with fioricet then comes back again. Also reports some photophobia, denies visual or auditory changes, nausea, vomiting, numbness, weakness or incontinence.     Epidural blood patch was discussed with patient along with risks and consequences. Patient is interested in a blood patch but wants some time to make a decision. Anesthesia team to follow up on 2/15/24.

## 2024-02-14 NOTE — PROGRESS NOTE ADULT - SUBJECTIVE AND OBJECTIVE BOX
Patient seen again at bedside today, s/p , postop day 2. Epidural blood patch was discussed with patient earlier in the day (see previous note). Patient states she'd like to continue with medication and hydration for today and decide tomorrow (2/15/24) if she wants the blood patch. Anesthesia team will follow up on 2/15.

## 2024-02-15 ENCOUNTER — TRANSCRIPTION ENCOUNTER (OUTPATIENT)
Age: 39
End: 2024-02-15

## 2024-02-15 DIAGNOSIS — O42.919 PRETERM PREMATURE RUPTURE OF MEMBRANES, UNSPECIFIED AS TO LENGTH OF TIME BETWEEN RUPTURE AND ONSET OF LABOR, UNSPECIFIED TRIMESTER: ICD-10-CM

## 2024-02-15 LAB
APTT BLD: 30.8 SEC — SIGNIFICANT CHANGE UP (ref 24.5–35.6)
INR BLD: <0.9 RATIO — SIGNIFICANT CHANGE UP (ref 0.85–1.18)
PROTHROM AB SERPL-ACNC: 10 SEC — SIGNIFICANT CHANGE UP (ref 9.5–13)

## 2024-02-15 RX ORDER — SIMETHICONE 80 MG/1
1 TABLET, CHEWABLE ORAL
Qty: 0 | Refills: 0 | DISCHARGE
Start: 2024-02-15

## 2024-02-15 RX ORDER — ACETAMINOPHEN 500 MG
975 TABLET ORAL EVERY 6 HOURS
Refills: 0 | Status: DISCONTINUED | OUTPATIENT
Start: 2024-02-15 | End: 2024-02-15

## 2024-02-15 RX ORDER — LANOLIN
1 OINTMENT (GRAM) TOPICAL
Qty: 0 | Refills: 0 | DISCHARGE
Start: 2024-02-15

## 2024-02-15 RX ORDER — ACETAMINOPHEN 500 MG
2 TABLET ORAL
Qty: 0 | Refills: 0 | DISCHARGE
Start: 2024-02-15

## 2024-02-15 RX ORDER — CALCIUM CARBONATE 500(1250)
2 TABLET ORAL
Qty: 0 | Refills: 0 | DISCHARGE
Start: 2024-02-15

## 2024-02-15 RX ORDER — IBUPROFEN 200 MG
1 TABLET ORAL
Qty: 0 | Refills: 0 | DISCHARGE
Start: 2024-02-15

## 2024-02-15 RX ORDER — ACETAMINOPHEN 500 MG
975 TABLET ORAL EVERY 6 HOURS
Refills: 0 | Status: DISCONTINUED | OUTPATIENT
Start: 2024-02-15 | End: 2024-02-16

## 2024-02-15 RX ADMIN — Medication 1 TABLET(S): at 05:46

## 2024-02-15 RX ADMIN — Medication 1 TABLET(S): at 11:42

## 2024-02-15 RX ADMIN — HEPARIN SODIUM 5000 UNIT(S): 5000 INJECTION INTRAVENOUS; SUBCUTANEOUS at 17:41

## 2024-02-15 RX ADMIN — Medication 600 MILLIGRAM(S): at 08:21

## 2024-02-15 RX ADMIN — Medication 600 MILLIGRAM(S): at 15:05

## 2024-02-15 RX ADMIN — Medication 975 MILLIGRAM(S): at 17:37

## 2024-02-15 RX ADMIN — HEPARIN SODIUM 5000 UNIT(S): 5000 INJECTION INTRAVENOUS; SUBCUTANEOUS at 05:46

## 2024-02-15 RX ADMIN — Medication 600 MILLIGRAM(S): at 09:00

## 2024-02-15 RX ADMIN — Medication 600 MILLIGRAM(S): at 14:33

## 2024-02-15 RX ADMIN — Medication 1 TABLET(S): at 06:15

## 2024-02-15 RX ADMIN — Medication 1 TABLET(S): at 12:15

## 2024-02-15 RX ADMIN — Medication 1 TABLET(S): at 01:00

## 2024-02-15 RX ADMIN — Medication 975 MILLIGRAM(S): at 18:15

## 2024-02-15 RX ADMIN — Medication 1 TABLET(S): at 00:03

## 2024-02-15 NOTE — PROGRESS NOTE ADULT - SUBJECTIVE AND OBJECTIVE BOX
Patient seen again today for follow up for Post Dural Puncture headache.  Patient received CSE on 24 for  section.  Despite conservative therapy with Fioricet patient continues to have positional neck ache, auditory disturbances (feels like she is underwater) and photophobia.  The headache is relieved by supine positioning.  Previously the patient had been hesitant to receive an epidural blood patch and has opted for conservative therapy but this AM she would like to receive the patch.  She received SQ heparin at 05:46 this AM.  she will be eligible for a blood patch around 12pm.  I have ordered PT/PTT/INR to assess coagulation status.  Patient has been encouraged to lie flat as it relieves her discomfort.  A brief discussion of RBA was had with the patient regarding the treatment and she understands and would like the blood patch.  We will return later today to perform the procedure.    Please call [phone] if there are any questions.

## 2024-02-15 NOTE — DISCHARGE NOTE OB - YES
Tried several attempts to contact Xin. Her voice mail is full and will not except messages. Calling to reschedule her follow up appt with Dr Glover due to he will not be in the office on 7/12/23.   Statement Selected

## 2024-02-15 NOTE — DISCHARGE NOTE OB - NS MD DC FALL RISK RISK
For information on Fall & Injury Prevention, visit: [email or web address] OR  [email or web address] OR  https://www.cdc.gov/steadi/patient.html

## 2024-02-15 NOTE — PROGRESS NOTE ADULT - SUBJECTIVE AND OBJECTIVE BOX
Patient seen and examined at bedside, no acute overnight events. No acute complaints, pain well controlled. Patient is ambulating, voiding spontaneously, passing flatus, and tolerating regular diet. Denies CP, SOB, N/V, HA, blurred vision, epigastric pain.    Vital Signs Last 24 Hours  T(C): 36.8 (02-15-24 @ 06:00), Max: 36.8 (02-15-24 @ 06:00)  HR: 75 (02-15-24 @ 06:00) (75 - 79)  BP: 103/69 (02-15-24 @ 06:00) (102/57 - 118/72)  RR: 16 (02-15-24 @ 06:00) (16 - 19)  SpO2: 100% (02-15-24 @ 06:00) (98% - 100%)    Physical Exam:  General: NAD  Abdomen: Soft, expected tenderness, non-distended, fundus firm  Incision: Pfannenstiel incision CDI, steristrips in place  Pelvic: Lochia wnl    Labs:    Blood Type: A Positive  Antibody Screen: Negative  RPR: Negative               10.5   15.95 )-----------( 185      ( 02-13 @ 06:25 )             31.6                11.7   13.90 )-----------( 189      ( 02-11 @ 19:00 )             35.5                11.4   7.97  )-----------( 179      ( 02-08 @ 10:22 )             33.4         MEDICATIONS  (STANDING):  acetaminophen 325 mG/butalbital 50 mG/caffeine 40 mG 1 Tablet(s) Oral every 6 hours  diphtheria/tetanus/pertussis (acellular) Vaccine (Adacel) 0.5 milliLiter(s) IntraMuscular once  famotidine    Tablet 20 milliGRAM(s) Oral daily  heparin   Injectable 5000 Unit(s) SubCutaneous every 12 hours  ibuprofen  Tablet. 600 milliGRAM(s) Oral every 6 hours  influenza   Vaccine 0.5 milliLiter(s) IntraMuscular once  prenatal multivitamin 1 Tablet(s) Oral daily    MEDICATIONS  (PRN):  acetaminophen     Tablet .. 650 milliGRAM(s) Oral every 6 hours PRN Moderate Pain (4 - 6)  calcium carbonate    500 mG (Tums) Chewable 2 Tablet(s) Chew every 6 hours PRN Heartburn  diphenhydrAMINE 25 milliGRAM(s) Oral every 6 hours PRN Pruritus  lanolin Ointment 1 Application(s) Topical every 6 hours PRN Sore Nipples  magnesium hydroxide Suspension 30 milliLiter(s) Oral two times a day PRN Constipation  oxyCODONE    IR 5 milliGRAM(s) Oral every 3 hours PRN Moderate to Severe Pain (4-10)  oxyCODONE    IR 5 milliGRAM(s) Oral once PRN Moderate to Severe Pain (4-10)  simethicone 80 milliGRAM(s) Chew every 4 hours PRN Gas

## 2024-02-15 NOTE — DISCHARGE NOTE OB - CARE PROVIDER_API CALL
Dixie Ng  Obstetrics and Gynecology  [address] [place] [address]  Phone: [phone]  Fax: [phone]  Established Patient  Follow Up Time: 2 weeks

## 2024-02-15 NOTE — PROGRESS NOTE ADULT - SUBJECTIVE AND OBJECTIVE BOX
Pt ambulating with complete relief of neck ache & almost complete relief of HA. Echoed previous conversation regarding return of HA, addressing new sympts with OB promptly.

## 2024-02-15 NOTE — DISCHARGE NOTE OB - MEDICATION SUMMARY - MEDICATIONS TO TAKE
I will START or STAY ON the medications listed below when I get home from the hospital:    ibuprofen 600 mg oral tablet  -- 1 tab(s) by mouth every 6 hours as needed for  moderate pain  -- Indication: For Status post  section    acetaminophen 325 mg oral tablet  -- 2 tab(s) by mouth every 6 hours as needed for Moderate Pain (4 - 6)  -- Indication: For Status post  section    butalbital/acetaminophen/caffeine 50 mg-325 mg-40 mg oral tablet  -- 1 tab(s) by mouth every 6 hours as needed for  headache  -- Indication: For Status post  section    calcium carbonate 500 mg (200 mg elemental calcium) oral tablet, chewable  -- 2 tab(s) by mouth every 6 hours As needed Heartburn  -- Indication: For Status post  section    lanolin topical ointment  -- 1 Apply on skin to affected area every 6 hours As needed Sore Nipples  -- Indication: For Status post  section    Prena1 oral capsule  -- 1 cap(s) by mouth once a day  -- Indication: For Status post  section    simethicone 80 mg oral tablet, chewable  -- 1 tab(s) by mouth every 4 hours As needed Gas  -- Indication: For Status post  section   I will START or STAY ON the medications listed below when I get home from the hospital:    ibuprofen 600 mg oral tablet  -- 1 tab(s) by mouth every 6 hours as needed for  moderate pain  -- Indication: For Pain    acetaminophen 325 mg oral tablet  -- 2 tab(s) by mouth every 6 hours as needed for Moderate Pain (4 - 6)  -- Indication: For Pain    butalbital/acetaminophen/caffeine 50 mg-325 mg-40 mg oral tablet  -- 1 tab(s) by mouth every 6 hours as needed for  headache  -- Indication: For Headache    calcium carbonate 500 mg (200 mg elemental calcium) oral tablet, chewable  -- 2 tab(s) by mouth every 6 hours As needed Heartburn  -- Indication: For hypoicalcemia    lanolin topical ointment  -- 1 Apply on skin to affected area every 6 hours As needed Sore Nipples  -- Indication: For Sore nipples    Prena1 oral capsule  -- 1 cap(s) by mouth once a day  -- Indication: For Vitamin    simethicone 80 mg oral tablet, chewable  -- 1 tab(s) by mouth every 4 hours As needed Gas  -- Indication: For gas discomfort   I will START or STAY ON the medications listed below when I get home from the hospital:    ibuprofen 600 mg oral tablet  -- 1 tab(s) by mouth every 6 hours as needed for  moderate pain  -- Indication: For Pain    acetaminophen 325 mg oral tablet  -- 2 tab(s) by mouth every 6 hours as needed for Moderate Pain (4 - 6)  -- Indication: For Pain    calcium carbonate 500 mg (200 mg elemental calcium) oral tablet, chewable  -- 2 tab(s) by mouth every 6 hours As needed Heartburn  -- Indication: For hypoicalcemia    lanolin topical ointment  -- 1 Apply on skin to affected area every 6 hours As needed Sore Nipples  -- Indication: For Sore nipples    Prena1 oral capsule  -- 1 cap(s) by mouth once a day  -- Indication: For Vitamin    simethicone 80 mg oral tablet, chewable  -- 1 tab(s) by mouth every 4 hours As needed Gas  -- Indication: For gas discomfort

## 2024-02-15 NOTE — PROGRESS NOTE ADULT - SUBJECTIVE AND OBJECTIVE BOX
Follow up visit for a patient with a PDPH receiving conservative therapy.  The patient is s/p a labor CSE on 2/12/24.    Since she complains of a positional headache and neck ache relieved when supine and exacerbated when sitting upright.  Her symptoms now are persistent associated with photophobia,  She denies any other symptoms including visual or auditory changes, nausea, numbeness, weakness and incontinence.  She's received analgesics with mild effect.      PE:  Pt sitting upright / laying supine in NAD  T(C): 36.8 (02-15-24 @ 06:00), Max: 36.8 (02-15-24 @ 06:00)  HR: 75 (02-15-24 @ 06:00) (75 - 79)  BP: 103/69 (02-15-24 @ 06:00) (102/57 - 118/72)  RR: 16 (02-15-24 @ 06:00) (16 - 19)  SpO2: 100% (02-15-24 @ 06:00) (98% - 100%)  Neck with FROM.    A/P: Likely PDPH.  Conservative therapy (Continuing analgesics, caffeine, hydration) and the natural progression of a PDPH (typically peaks in 48hours and 70% resolve by one week) discussed with the patient.  The option of an epidural blood patch (EBP) was discussed as well including the procedure and the risks including a new dural puncture possibly perpetuating her symptoms (1-2%), prolonged (weeks) lower back discomfort, and rare nerve injury. The success and failure rate of an EBP was discussed as complete relief in over half of pts, partial relief in an additional 20-35%, and a complete failure in ~10-15%.  She understands the necessity of getting an EBP for any visual or auditory symptoms and the importance of immediately alerting her physician for any new or worsening symptoms as this may be a sign of a dangerous condition that is not from the PDPH. She also understands that she may come back in as an outpatient to get an EBP. Pt agrres to proceed with EBP.

## 2024-02-15 NOTE — DISCHARGE NOTE OB - HOSPITAL COURSE
admitted for PPROM. RCS due to labor. RCS uncomplicated. POD course uncomplicated. spinal HA s/p blood patch

## 2024-02-15 NOTE — DISCHARGE NOTE OB - PLAN OF CARE
Agree with above findings. Denies any lightheadedness dizziness, HA, epigastric pain, blurred vision, CP, SOB, N/V, fevers, and chills or calf pain. Ambulating, voiding and eating regular food without any difficulty. Passed flatus. Minimal lochia. Pain is controlled. VSS. Incision c/d/i/. 38y yo  s/p RCS at 33w3d due to PPROM at 32w6d. Pt is doing well and meeting milestones. s/p empiric tx for chorioamnionitis vss stable. labs stable.   Cont POD care  Encourage ambulation, breastfeeding and ISS use  Defer contraception for PPD visit  Spinal HA. supportive management for now. s/p blood patch  Anticipate discharge in tomorrow  Dixie Ng MD MPH Agree with above findings. Denies any lightheadedness dizziness, HA, epigastric pain, blurred vision, CP, SOB, N/V, fevers, and chills or calf pain. Ambulating, voiding and eating regular food without any difficulty. Passed flatus. Minimal lochia. Pain is controlled. VSS. Incision c/d/i/. 38y yo  s/p RCS at 33w3d due to PPROM at 32w6d. Pt is doing well and meeting milestones. s/p empiric tx for chorioamnionitis vss stable. labs stable.   Cont POD care  Encourage ambulation, breastfeeding and ISS use  Defer contraception for PPD visit  Spinal HA. supportive management for now. s/p blood patch  Anticipate discharge in tomorrow  Dixie Ng MD MPH  After discharge, please stay on pelvic rest for 6 weeks, meaning no sexual intercourse, no tampons and no douching.  No driving for 2 weeks as women can loose a lot of blood during delivery and there is a possibility of being lightheaded/fainting.  No lifting objects heavier than baby for two weeks.  Expect to have vaginal bleeding/spotting for up to six weeks.  The bleeding should get lighter and more white/light brown with time.  For bleeding soaking more than a pad an hour or passing clots greater than the size of your fist, come in to the emergency department.    Follow up in OB office in 1-2 weeks for incision check.  Call for noticeable increase in redness or swelling at incision, discharge from incision, or opening of skin at incision site After discharge, please stay on pelvic rest for 6 weeks, meaning no sexual intercourse, no tampons and no douching.  No driving for 2 weeks as women can loose a lot of blood during delivery and there is a possibility of being lightheaded/fainting.  No lifting objects heavier than baby for two weeks.  Expect to have vaginal bleeding/spotting for up to six weeks.  The bleeding should get lighter and more white/light brown with time.  For bleeding soaking more than a pad an hour or passing clots greater than the size of your fist, come in to the emergency department.    Follow up in OB office in 1-2 weeks for incision check.  Call for noticeable increase in redness or swelling at incision, discharge from incision, or opening of skin at incision site

## 2024-02-15 NOTE — PROGRESS NOTE ADULT - ASSESSMENT
39yo  POD#3 s/p rLTCS. QBL: 546ml. Vitals stable. Pt continues to complain of headache and neck pain/stiffness that has not gotten better since yesterday.     #Postural headache  - Pt complaining of neck stiffness and headache  - seen by Anesthesia, started on Fiorcet (-)  - For blood patch today   - Continue to reassess for progression symptoms PRN    #postpartum  -HCT: 35.3->31.6  - continue with PO analgesia  - increase ambulation  - continue regular diet  - encourage incentive spirometry    Faiza Macario PGY1 39yo  POD#3 s/p rLTCS. QBL: 546ml. Vitals stable. Pt continues to complain of headache and neck pain/stiffness that has not gotten better since yesterday.     #Postural headache  - Pt complaining of neck stiffness and headache  - seen by Anesthesia, started on Fiorcet (-)  - For blood patch today   - Continue to reassess for progression symptoms PRN    #postpartum  -HCT: 35.3->31.6  - continue with PO analgesia  - increase ambulation  - continue regular diet  - encourage incentive spirometry    Faiza Macario PGY1    Agree with above findings. Denies any lightheadedness dizziness, HA, epigastric pain, blurred vision, CP, SOB, N/V, fevers, and chills or calf pain. Ambulating, voiding and eating regular food without any difficulty. Passed flatus. Minimal lochia. Pain is controlled. VSS. Incision c/d/i/. 38y yo  s/p RCS at 33w3d due to PPROM at 32w6d. Pt is doing well and meeting milestones. s/p empiric tx for chorioamnionitis vss stable. labs stable.   Cont POD care  Encourage ambulation, breastfeeding and ISS use  Defer contraception for PPD visit  Spinal HA. supportive management for now. s/p blood patch  Anticipate discharge in tomorrow  Dixie Ng MD MPH

## 2024-02-15 NOTE — DISCHARGE NOTE OB - MEDICATION SUMMARY - MEDICATIONS TO STOP TAKING
I will STOP taking the medications listed below when I get home from the hospital:  None I will STOP taking the medications listed below when I get home from the hospital:    butalbital/acetaminophen/caffeine 50 mg-325 mg-40 mg oral tablet  -- 1 tab(s) by mouth every 6 hours as needed for  headache

## 2024-02-15 NOTE — DISCHARGE NOTE OB - CARE PLAN
Principal Discharge DX:	Status post  section  Assessment and plan of treatment:	Agree with above findings. Denies any lightheadedness dizziness, HA, epigastric pain, blurred vision, CP, SOB, N/V, fevers, and chills or calf pain. Ambulating, voiding and eating regular food without any difficulty. Passed flatus. Minimal lochia. Pain is controlled. VSS. Incision c/d/i/. 38y yo  s/p RCS at 33w3d due to PPROM at 32w6d. Pt is doing well and meeting milestones. s/p empiric tx for chorioamnionitis vss stable. labs stable.   Cont POD care  Encourage ambulation, breastfeeding and ISS use  Defer contraception for PPD visit  Spinal HA. supportive management for now. s/p blood patch  Anticipate discharge in tomorrow  Dixie Ng MD MPH   1 Principal Discharge DX:	Status post  section  Assessment and plan of treatment:	Agree with above findings. Denies any lightheadedness dizziness, HA, epigastric pain, blurred vision, CP, SOB, N/V, fevers, and chills or calf pain. Ambulating, voiding and eating regular food without any difficulty. Passed flatus. Minimal lochia. Pain is controlled. VSS. Incision c/d/i/. 38y yo  s/p RCS at 33w3d due to PPROM at 32w6d. Pt is doing well and meeting milestones. s/p empiric tx for chorioamnionitis vss stable. labs stable.   Cont POD care  Encourage ambulation, breastfeeding and ISS use  Defer contraception for PPD visit  Spinal HA. supportive management for now. s/p blood patch  Anticipate discharge in tomorrow  Dixie Ng MD MPH  After discharge, please stay on pelvic rest for 6 weeks, meaning no sexual intercourse, no tampons and no douching.  No driving for 2 weeks as women can loose a lot of blood during delivery and there is a possibility of being lightheaded/fainting.  No lifting objects heavier than baby for two weeks.  Expect to have vaginal bleeding/spotting for up to six weeks.  The bleeding should get lighter and more white/light brown with time.  For bleeding soaking more than a pad an hour or passing clots greater than the size of your fist, come in to the emergency department.    Follow up in OB office in 1-2 weeks for incision check.  Call for noticeable increase in redness or swelling at incision, discharge from incision, or opening of skin at incision site   Principal Discharge DX:	Status post  section  Assessment and plan of treatment:	After discharge, please stay on pelvic rest for 6 weeks, meaning no sexual intercourse, no tampons and no douching.  No driving for 2 weeks as women can loose a lot of blood during delivery and there is a possibility of being lightheaded/fainting.  No lifting objects heavier than baby for two weeks.  Expect to have vaginal bleeding/spotting for up to six weeks.  The bleeding should get lighter and more white/light brown with time.  For bleeding soaking more than a pad an hour or passing clots greater than the size of your fist, come in to the emergency department.    Follow up in OB office in 1-2 weeks for incision check.  Call for noticeable increase in redness or swelling at incision, discharge from incision, or opening of skin at incision site

## 2024-02-15 NOTE — DISCHARGE NOTE OB - PATIENT PORTAL LINK FT
You can access the FollowMyHealth Patient Portal offered by [facility] by registering at the following website: [email or web address]. By joining [facility]’s FollowMyHealth portal, you will also be able to view your health information using other applications (apps) compatible with our system.

## 2024-02-16 VITALS
DIASTOLIC BLOOD PRESSURE: 68 MMHG | SYSTOLIC BLOOD PRESSURE: 101 MMHG | HEART RATE: 97 BPM | RESPIRATION RATE: 18 BRPM | OXYGEN SATURATION: 99 % | TEMPERATURE: 98 F

## 2024-02-16 RX ADMIN — Medication 600 MILLIGRAM(S): at 06:20

## 2024-02-16 RX ADMIN — Medication 975 MILLIGRAM(S): at 02:46

## 2024-02-16 RX ADMIN — Medication 600 MILLIGRAM(S): at 00:58

## 2024-02-16 RX ADMIN — Medication 975 MILLIGRAM(S): at 02:16

## 2024-02-16 RX ADMIN — Medication 600 MILLIGRAM(S): at 12:08

## 2024-02-16 RX ADMIN — Medication 1 TABLET(S): at 12:08

## 2024-02-16 RX ADMIN — Medication 975 MILLIGRAM(S): at 09:37

## 2024-02-16 RX ADMIN — Medication 600 MILLIGRAM(S): at 12:40

## 2024-02-16 RX ADMIN — Medication 600 MILLIGRAM(S): at 05:50

## 2024-02-16 RX ADMIN — Medication 975 MILLIGRAM(S): at 10:10

## 2024-02-16 RX ADMIN — Medication 600 MILLIGRAM(S): at 00:28

## 2024-02-16 NOTE — PROGRESS NOTE ADULT - SUBJECTIVE AND OBJECTIVE BOX
39yo Csection POD#4 s/p EBP  Patient reports significant PDPH improvement following EBP. Patient ambulating. No new symptoms noted, no issues noted.

## 2024-02-16 NOTE — PROGRESS NOTE ADULT - SUBJECTIVE AND OBJECTIVE BOX
Patient seen and examined at bedside, no acute overnight events. No acute complaints, pain well controlled. Patient is ambulating, voiding spontaneously, passing flatus, and tolerating regular diet. Denies CP, SOB, N/V, HA, blurred vision, epigastric pain.    Vital Signs Last 24 Hours  T(C): 36.7 (02-16-24 @ 05:58), Max: 37.1 (02-15-24 @ 14:00)  HR: 63 (02-16-24 @ 05:58) (63 - 91)  BP: 103/71 (02-16-24 @ 05:58) (103/71 - 138/82)  RR: 17 (02-16-24 @ 05:58) (17 - 18)  SpO2: 100% (02-16-24 @ 05:58) (97% - 100%)    Physical Exam:  General: NAD  Abdomen: Soft, expected tenderness, non-distended, fundus firm  Incision: Pfannenstiel incision CDI, steristrips in place  Pelvic: Lochia wnl    Labs:    Blood Type: A Positive  Antibody Screen: Negative  RPR: Negative               10.5   15.95 )-----------( 185      ( 02-13 @ 06:25 )             31.6                11.7   13.90 )-----------( 189      ( 02-11 @ 19:00 )             35.5                11.4   7.97  )-----------( 179      ( 02-08 @ 10:22 )             33.4         MEDICATIONS  (STANDING):  acetaminophen     Tablet .. 975 milliGRAM(s) Oral every 6 hours  diphtheria/tetanus/pertussis (acellular) Vaccine (Adacel) 0.5 milliLiter(s) IntraMuscular once  famotidine    Tablet 20 milliGRAM(s) Oral daily  heparin   Injectable 5000 Unit(s) SubCutaneous every 12 hours  ibuprofen  Tablet. 600 milliGRAM(s) Oral every 6 hours  influenza   Vaccine 0.5 milliLiter(s) IntraMuscular once  prenatal multivitamin 1 Tablet(s) Oral daily    MEDICATIONS  (PRN):  calcium carbonate    500 mG (Tums) Chewable 2 Tablet(s) Chew every 6 hours PRN Heartburn  diphenhydrAMINE 25 milliGRAM(s) Oral every 6 hours PRN Pruritus  lanolin Ointment 1 Application(s) Topical every 6 hours PRN Sore Nipples  magnesium hydroxide Suspension 30 milliLiter(s) Oral two times a day PRN Constipation  oxyCODONE    IR 5 milliGRAM(s) Oral every 3 hours PRN Moderate to Severe Pain (4-10)  oxyCODONE    IR 5 milliGRAM(s) Oral once PRN Moderate to Severe Pain (4-10)  simethicone 80 milliGRAM(s) Chew every 4 hours PRN Gas

## 2024-02-16 NOTE — PROGRESS NOTE ADULT - PROVIDER SPECIALTY LIST ADULT
Anesthesia
OB
Anesthesia
OB
Anesthesia

## 2024-02-17 ENCOUNTER — OUTPATIENT (OUTPATIENT)
Dept: INPATIENT UNIT | Facility: HOSPITAL | Age: 39
LOS: 1 days | Discharge: HOME | End: 2024-02-17
Payer: MEDICAID

## 2024-02-17 DIAGNOSIS — O26.899 OTHER SPECIFIED PREGNANCY RELATED CONDITIONS, UNSPECIFIED TRIMESTER: ICD-10-CM

## 2024-02-17 DIAGNOSIS — Z98.89 OTHER SPECIFIED POSTPROCEDURAL STATES: Chronic | ICD-10-CM

## 2024-02-17 DIAGNOSIS — Z98.890 OTHER SPECIFIED POSTPROCEDURAL STATES: Chronic | ICD-10-CM

## 2024-02-17 DIAGNOSIS — Z98.891 HISTORY OF UTERINE SCAR FROM PREVIOUS SURGERY: Chronic | ICD-10-CM

## 2024-02-17 LAB
CULTURE RESULTS: NO GROWTH — SIGNIFICANT CHANGE UP
CULTURE RESULTS: NO GROWTH — SIGNIFICANT CHANGE UP
SPECIMEN SOURCE: SIGNIFICANT CHANGE UP
SPECIMEN SOURCE: SIGNIFICANT CHANGE UP

## 2024-02-17 PROCEDURE — 99213 OFFICE O/P EST LOW 20 MIN: CPT

## 2024-02-18 RX ORDER — ACETAMINOPHEN 500 MG
1000 TABLET ORAL ONCE
Refills: 0 | Status: COMPLETED | OUTPATIENT
Start: 2024-02-18 | End: 2024-02-18

## 2024-02-18 RX ORDER — CAFFEINE 200 MG
200 TABLET ORAL ONCE
Refills: 0 | Status: COMPLETED | OUTPATIENT
Start: 2024-02-18 | End: 2024-02-18

## 2024-02-18 RX ORDER — CAFFEINE/SODIUM BENZOATE 250 MG/ML
500 VIAL (ML) INJECTION ONCE
Refills: 0 | Status: DISCONTINUED | OUTPATIENT
Start: 2024-02-18 | End: 2024-02-18

## 2024-02-18 RX ADMIN — Medication 200 MILLIGRAM(S): at 02:50

## 2024-02-18 RX ADMIN — Medication 1000 MILLIGRAM(S): at 01:23

## 2024-02-18 RX ADMIN — Medication 1000 MILLIGRAM(S): at 00:20

## 2024-02-18 NOTE — PROGRESS NOTE ADULT - SUBJECTIVE AND OBJECTIVE BOX
Pt seen by bedside for PDPH headache. Pt received epidural blood patch on 2/16/2024 with immediate symptomatic relief and improvement. Overnight Pt states headache came back, as well a neck stiffness/pain.     Pt states headache has improved since arriving to the hospital, and able to sit up without too much discomfort. Explained risks and benefits of a second blood patch. Pt wants to try conservative treatment given improvement of headache, and not as severe as it was before.     Tylenol given at midnight. Will give caffeine IV as well.

## 2024-02-18 NOTE — OB POSTPARTUM TRIAGE NOTE - NSOBPROVIDERNOTE_OBGYN_ALL_OB_FT
Tylenol 1000mg ordered and given to pt.   Anesthesia called at 0008 to evaluate the pt. Tylenol 1000mg ordered and given to pt. @ 12:20a  Anesthesia called at 0008 to evaluate the pt. Tylenol 1000mg ordered and given to pt. @ 12:20a  Anesthesia called at 0008 to evaluate the pt.    1:30 REFUGIO Jerome anesthesia at the bedside evaluating HA  See note  Relief from Tylenol, IV Caffine to be given as per Dr. Jerome  Cleared from anesthesia Tylenol 1000mg ordered and given to pt. @ 12:20a  Anesthesia called at 0008 to evaluate the pt.    1:30 REFUGIO Jerome anesthesia at the bedside evaluating HA  See note  Relief from Tylenol HA now 7/10 from 10/10, Caffeine Orally, 200mg one dose to be ordered  as per Dr. Jerome  Cleared from anesthesia Tylenol 1000mg ordered and given to pt. @ 12:20a  Anesthesia called at 0008 to evaluate the pt.    1:30 REFUGIO Jerome anesthesia at the bedside evaluating HA  See note  Relief from Tylenol HA now 7/10 from 10/10, Caffeine Orally, 200mg one dose to be ordered  as per Dr. Jerome  Cleared from anesthesia    @0300  Received report from REFUGIO Sultana NP  Pt. resting comfortably     @0405  Pt. states relief after receiving caffeine   Dr. Ng made aware.   Pt. d/c'd home with Fioricet [id number] every 6hrs PRN for headache for 14 days.   Pt. instructed to return to triage with increase headache.

## 2024-02-18 NOTE — OB POSTPARTUM TRIAGE NOTE - NS_DCDISPOSITION_OBGYN_ALL_OB
Situation:   Received call from writer for notifying Dr. Mariano of symptoms..    Key Assessments:   Patient has had ongoing L chest discomfort that occurs intermittently since surgery to chest in November 2022. He describes it as a cramping sensation when he inhales and exhales. It is not constant, but comes and goes and wanted to call to document when it is occurring. He rates it as 4-5/10. Denies sweating, shortness of breath or any other associated symptoms. Denies any change in pain from previous episodes.     Actions Taken:   Patient is aware of cardiology this week. Plans to get ordered lab work completed at that time. Will inform Dr. Mariano per patient request.     Program plan:   Will follow up with patient following upcoming cardiology appointment.  Will assess for pain and attempt to complete additional initial assessments.     Next follow up:  1 week      See hyperlinks within encounter for full documentation.    
Home

## 2024-02-18 NOTE — OB POSTPARTUM TRIAGE NOTE - HISTORY OF PRESENT ILLNESS
Pt. is a 39y/o  6 days postpartum of a repeat  d/t failed TOLAC. Pt. is also s/p blood patch 2 days ago. Pt. states last night she started to experience neck stiffness and headache that has increased in intensity Pt. states she has been staying at the [facility] and has only been able to take ASA which has not helped her headache.

## 2024-02-18 NOTE — OB POSTPARTUM TRIAGE NOTE - ADDITIONAL INSTRUCTIONS
Pt. d/c'd home with Fioricet [id number] every 6hrs PRN for headache for 14 days.   Pt. instructed to return to triage with increase headache.

## 2024-02-21 DIAGNOSIS — R51.9 HEADACHE, UNSPECIFIED: ICD-10-CM

## 2024-02-24 LAB — SURGICAL PATHOLOGY STUDY: SIGNIFICANT CHANGE UP

## 2024-03-04 ENCOUNTER — APPOINTMENT (OUTPATIENT)
Dept: ANTEPARTUM | Facility: CLINIC | Age: 39
End: 2024-03-04

## 2024-04-15 NOTE — DISCHARGE NOTE OB - NSDCCPGOAL_GEN_ALL_CORE_FT
home Patient Specific Otc Recommendations (Will Not Stick From Patient To Patient): Ebanel BID Detail Level: Zone

## 2024-11-22 ENCOUNTER — NON-APPOINTMENT (OUTPATIENT)
Age: 39
End: 2024-11-22

## 2024-12-02 NOTE — OB PROVIDER TRIAGE NOTE - NS_DISPOSITION_OBGYN_ALL_OB
"Magy William is a 89 y.o. female on day 1 of admission presenting with Atrial fibrillation with RVR (Multi).    Subjective   No acute events overnight. No further chest pain. Comfortable. Daughter at the bedside.        Objective     VITALS  Blood pressure 101/59, pulse 61, temperature 37.2 °C (98.9 °F), temperature source Temporal, resp. rate 17, height 1.499 m (4' 11\"), weight 66.7 kg (147 lb), SpO2 96%.  Physical Exam  Vitals and nursing note reviewed.   Constitutional:       General: She is not in acute distress.     Appearance: Normal appearance.   HENT:      Head: Normocephalic and atraumatic.   Cardiovascular:      Rate and Rhythm: Normal rate and regular rhythm.      Heart sounds: Normal heart sounds.   Pulmonary:      Effort: Pulmonary effort is normal. No respiratory distress.      Breath sounds: Normal breath sounds. No wheezing.   Abdominal:      General: Abdomen is flat. Bowel sounds are normal. There is no distension.      Palpations: Abdomen is soft.      Tenderness: There is no abdominal tenderness.   Musculoskeletal:         General: No swelling or tenderness. Normal range of motion.   Skin:     General: Skin is warm and dry.      Capillary Refill: Capillary refill takes less than 2 seconds.   Neurological:      General: No focal deficit present.      Mental Status: She is alert and oriented to person, place, and time.   Psychiatric:         Mood and Affect: Mood normal.           Intake/Output last 3 Shifts:  No intake/output data recorded.    Relevant Results  Results for orders placed or performed during the hospital encounter of 12/01/24 (from the past 24 hours)   CBC and Auto Differential   Result Value Ref Range    WBC 7.4 4.4 - 11.3 x10*3/uL    nRBC 0.0 0.0 - 0.0 /100 WBCs    RBC 4.68 4.00 - 5.20 x10*6/uL    Hemoglobin 13.7 12.0 - 16.0 g/dL    Hematocrit 41.2 36.0 - 46.0 %    MCV 88 80 - 100 fL    MCH 29.3 26.0 - 34.0 pg    MCHC 33.3 32.0 - 36.0 g/dL    RDW 15.4 (H) 11.5 - 14.5 %    " Continue to Observe Platelets 241 150 - 450 x10*3/uL    Neutrophils % 45.0 40.0 - 80.0 %    Immature Granulocytes %, Automated 0.3 0.0 - 0.9 %    Lymphocytes % 37.0 13.0 - 44.0 %    Monocytes % 17.3 2.0 - 10.0 %    Eosinophils % 0.3 0.0 - 6.0 %    Basophils % 0.1 0.0 - 2.0 %    Neutrophils Absolute 3.33 1.60 - 5.50 x10*3/uL    Immature Granulocytes Absolute, Automated 0.02 0.00 - 0.50 x10*3/uL    Lymphocytes Absolute 2.74 0.80 - 3.00 x10*3/uL    Monocytes Absolute 1.28 (H) 0.05 - 0.80 x10*3/uL    Eosinophils Absolute 0.02 0.00 - 0.40 x10*3/uL    Basophils Absolute 0.01 0.00 - 0.10 x10*3/uL   Comprehensive metabolic panel   Result Value Ref Range    Glucose 241 (H) 74 - 99 mg/dL    Sodium 135 (L) 136 - 145 mmol/L    Potassium 4.1 3.5 - 5.3 mmol/L    Chloride 101 98 - 107 mmol/L    Bicarbonate 24 21 - 32 mmol/L    Anion Gap 14 10 - 20 mmol/L    Urea Nitrogen 20 6 - 23 mg/dL    Creatinine 1.06 (H) 0.50 - 1.05 mg/dL    eGFR 48 (L) >60 mL/min/1.73m*2    Calcium 9.3 8.6 - 10.3 mg/dL    Albumin 4.2 3.4 - 5.0 g/dL    Alkaline Phosphatase 88 33 - 136 U/L    Total Protein 7.8 6.4 - 8.2 g/dL    AST 24 9 - 39 U/L    Bilirubin, Total 0.4 0.0 - 1.2 mg/dL    ALT 18 7 - 45 U/L   TSH with reflex to Free T4 if abnormal   Result Value Ref Range    Thyroid Stimulating Hormone 5.00 (H) 0.44 - 3.98 mIU/L   Troponin I, High Sensitivity, Initial   Result Value Ref Range    Troponin I, High Sensitivity 44 (H) 0 - 13 ng/L   Thyroxine, Free   Result Value Ref Range    Thyroxine, Free 1.15 (H) 0.61 - 1.12 ng/dL   ECG 12 lead   Result Value Ref Range    Ventricular Rate 116 BPM    QRS Duration 78 ms    QT Interval 326 ms    QTC Calculation(Bazett) 453 ms    R Axis -46 degrees    T Axis 41 degrees    QRS Count 19 beats    Q Onset 214 ms    T Offset 377 ms    QTC Fredericia 406 ms   Troponin, High Sensitivity, 1 Hour   Result Value Ref Range    Troponin I, High Sensitivity 1,156 (HH) 0 - 13 ng/L   CBC   Result Value Ref Range    WBC 7.2 4.4 - 11.3 x10*3/uL    nRBC  0.0 0.0 - 0.0 /100 WBCs    RBC 4.64 4.00 - 5.20 x10*6/uL    Hemoglobin 13.4 12.0 - 16.0 g/dL    Hematocrit 41.5 36.0 - 46.0 %    MCV 89 80 - 100 fL    MCH 28.9 26.0 - 34.0 pg    MCHC 32.3 32.0 - 36.0 g/dL    RDW 15.5 (H) 11.5 - 14.5 %    Platelets 229 150 - 450 x10*3/uL   Basic metabolic panel   Result Value Ref Range    Glucose 211 (H) 74 - 99 mg/dL    Sodium 134 (L) 136 - 145 mmol/L    Potassium 3.9 3.5 - 5.3 mmol/L    Chloride 102 98 - 107 mmol/L    Bicarbonate 24 21 - 32 mmol/L    Anion Gap 12 10 - 20 mmol/L    Urea Nitrogen 16 6 - 23 mg/dL    Creatinine 0.84 0.50 - 1.05 mg/dL    eGFR 63 >60 mL/min/1.73m*2    Calcium 9.4 8.6 - 10.3 mg/dL   Lipid Panel   Result Value Ref Range    Cholesterol 185 0 - 199 mg/dL    HDL-Cholesterol 34.3 mg/dL    Cholesterol/HDL Ratio 5.4     LDL Calculated 105 (H) <=99 mg/dL    VLDL 46 (H) 0 - 40 mg/dL    Triglycerides 228 (H) 0 - 149 mg/dL    Non HDL Cholesterol 151 (H) 0 - 149 mg/dL   POCT GLUCOSE   Result Value Ref Range    POCT Glucose 214 (H) 74 - 99 mg/dL   POCT GLUCOSE   Result Value Ref Range    POCT Glucose 248 (H) 74 - 99 mg/dL       Imaging Results  CT angio chest abdomen pelvis   Final Result   1. No thoracic or abdominal aortic aneurysm or acute aortic pathology.   2. Mild septal thickening and scattered areas of ground-glass within   the lower lobe suggesting mild edema or small airways disease.   3. 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 Additional foci of at least moderate narrowing within the mid   to distal superior mesenteric artery. There is no CT evidence of   acute mesenteric ischemia. Recommend correlation for clinical   evidence of chronic mesenteric ischemia.   4. Moderate-to-large colonic stool burden in the large amount of   stool within the rectum, correlate for constipation.        MACRO:   None        Signed by: Daquan Ramirez 12/1/2024 10:21 PM   Dictation  workstation:   [id number]      Pending)       Medications:  apixaban, 2.5 mg, oral, BID  ascorbic acid, 500 mg, oral, Daily  aspirin, 81 mg, oral, Daily  azithromycin, 500 mg, oral, q24h MARRY  cefTRIAXone, 1 g, intravenous, q24h  cholecalciferol, 2,000 Units, oral, Daily  glimepiride, 1.5 mg, oral, BID  insulin lispro, 0-5 Units, subcutaneous, TID AC  lisinopril, 20 mg, oral, Daily  metoprolol tartrate, 50 mg, oral, BID  pantoprazole, 40 mg, oral, Daily before breakfast   Or  pantoprazole, 40 mg, intravenous, Daily before breakfast  perflutren lipid microspheres, 0.5-10 mL of dilution, intravenous, Once in imaging  perflutren protein A microsphere, 0.5 mL, intravenous, Once in imaging  polyethylene glycol, 17 g, oral, Daily  sulfur hexafluoride microsphr, 2 mL, intravenous, Once in imaging  venlafaxine, 37.5 mg, oral, BID       PRN medications: acetaminophen **OR** acetaminophen **OR** acetaminophen, dextrose, dextrose, docusate sodium, glucagon, glucagon, ondansetron **OR** ondansetron        Assessment/Plan          Principal Problem:    Atrial fibrillation with RVR (Multi)    Paroxysmal atrial fibrillation with RVR  -Metoprolol 50 mg orally twice daily  -Cardiology consultation  -Telemetry  -Eliquis      Type II non-STEMI from A-fib with RVR  -Trend Troponin  -Cardiology to see  -Echo      Right perihilar pneumonia, CAP  -Ceftriaxone  -Zithromax  -Check urine antigens for strep pneumo and Legionella     Type 2 diabetes mellitus  -Continue Amaryl 1 mg  -Lispro per corrective scale    DVT Prophylaxis:  Eliquis    Disposition:  If no further eval by cards anticipate DC tomorrow.     Manuel Sumner, DO [facility]       Discharge

## 2024-12-26 NOTE — OB RN PATIENT PROFILE - BSA (M2)
[FreeTextEntry1] : Silvia is 77 year old female here with history of bilateral excisions for right breast intraductal papilloma and left breast radial scar in 2024: Left IDP and radial scar/ Right IDP and radial scar.  FHx: aunt with breast cancer in her 80s  Her history is as follows: 1/26/24: Bilateral lumpectomies 1. Breast, left 3 o'clock; lumpectomy: - Intraductal papilloma. - Radial scar. - Biopsy site changes.  2. Breast, right; lumpectomy: - Intraductal papilloma. - Radial scar. - Biopsy site changes.  06/10/2024 b/l dx mammo and US -->birads2  scattered areas of fibroglandular density.  Postsurgical changes are noted bilaterally.  Scattered benign-appearing coarse calcifications and vascular calcifications are seen.  Nodularity in the upper outer left breast is decreased from previous examinations. RIGHT US: Cysts are seen measuring up to 5 mm at the 5:00 position 5 cm from the nipple. At the 3:00 position 2 cm from nipple the scar site is noted. At the 9:00 position 8 cm from nipple there is suggestion of a benign-appearing lymph node measuring 7 x 3 x 6 mm, similar to that seen on 12/22/2023. LEFT US: At the 3:00 retroareolar position the scar site is noted with a fluid collection measuring up to 1.6 x 0.3 x 1.5 cm. Multiple small cysts are seen measuring up to 5 mm. Rec annual screening.  Denies any current complaints. No acute changes to her breasts.  1.82

## 2025-01-30 ENCOUNTER — NON-APPOINTMENT (OUTPATIENT)
Age: 40
End: 2025-01-30

## 2025-03-03 NOTE — ED ADULT TRIAGE NOTE - PAIN RATING/NUMBER SCALE (0-10): ACTIVITY
[facility] APPOINTMENT CONFIRMATION    Date: 3/10/25    Time: 3:00pm     [facility] [address] [place] [address], [address] [facility] [address]    Provider name: Dr. Pb Nguyen     Type: In-office visit    Zoom link sent (if applicable): N/A    Will patient be in [place] for the virtual visit? N/A    Is patient currently in a facility? No    Patient will bring new patient packet to appointment: N/A    Alternative contact information: n/a    Appointment confirmed: left phone message    \"Please ensure you bring the medication bottles, including the dates and times you take each medication.\"    \"Please remember to bring your hearing aids to your appointment if you use them.\"    \"We do have free parking available in the main hospital parking lot. However, parking can be difficult to find so we ask that you arrive 40 minutes prior to your appointment.\"     10

## 2025-03-21 ENCOUNTER — NON-APPOINTMENT (OUTPATIENT)
Age: 40
End: 2025-03-21

## 2025-03-21 ENCOUNTER — EMERGENCY (EMERGENCY)
Facility: HOSPITAL | Age: 40
LOS: 1 days | Discharge: HOME | End: 2025-03-21
Attending: STUDENT IN AN ORGANIZED HEALTH CARE EDUCATION/TRAINING PROGRAM | Admitting: STUDENT IN AN ORGANIZED HEALTH CARE EDUCATION/TRAINING PROGRAM
Payer: MEDICAID

## 2025-03-21 VITALS
HEART RATE: 98 BPM | DIASTOLIC BLOOD PRESSURE: 83 MMHG | SYSTOLIC BLOOD PRESSURE: 123 MMHG | TEMPERATURE: 99 F | WEIGHT: 167.99 LBS | RESPIRATION RATE: 18 BRPM | OXYGEN SATURATION: 98 %

## 2025-03-21 DIAGNOSIS — Z98.890 OTHER SPECIFIED POSTPROCEDURAL STATES: Chronic | ICD-10-CM

## 2025-03-21 DIAGNOSIS — Z98.89 OTHER SPECIFIED POSTPROCEDURAL STATES: Chronic | ICD-10-CM

## 2025-03-21 DIAGNOSIS — Z98.891 HISTORY OF UTERINE SCAR FROM PREVIOUS SURGERY: Chronic | ICD-10-CM

## 2025-03-21 LAB
BASOPHILS # BLD AUTO: 0.01 K/UL — SIGNIFICANT CHANGE UP (ref 0–0.2)
BASOPHILS NFR BLD AUTO: 0.1 % — SIGNIFICANT CHANGE UP (ref 0–2)
EOSINOPHIL # BLD AUTO: 0.01 K/UL — SIGNIFICANT CHANGE UP (ref 0–0.5)
EOSINOPHIL NFR BLD AUTO: 0.1 % — SIGNIFICANT CHANGE UP (ref 0–6)
HCT VFR BLD CALC: 44 % — SIGNIFICANT CHANGE UP (ref 34.5–45)
HGB BLD-MCNC: 14.8 G/DL — SIGNIFICANT CHANGE UP (ref 11.5–15.5)
IANC: 5.58 K/UL — SIGNIFICANT CHANGE UP (ref 1.8–7.4)
IMM GRANULOCYTES NFR BLD AUTO: 0.6 % — SIGNIFICANT CHANGE UP (ref 0–0.9)
LYMPHOCYTES # BLD AUTO: 0.78 K/UL — LOW (ref 1–3.3)
LYMPHOCYTES # BLD AUTO: 11.5 % — LOW (ref 13–44)
MCHC RBC-ENTMCNC: 30.2 PG — SIGNIFICANT CHANGE UP (ref 27–34)
MCHC RBC-ENTMCNC: 33.6 G/DL — SIGNIFICANT CHANGE UP (ref 32–36)
MCV RBC AUTO: 89.8 FL — SIGNIFICANT CHANGE UP (ref 80–100)
MONOCYTES # BLD AUTO: 0.38 K/UL — SIGNIFICANT CHANGE UP (ref 0–0.9)
MONOCYTES NFR BLD AUTO: 5.6 % — SIGNIFICANT CHANGE UP (ref 2–14)
NEUTROPHILS # BLD AUTO: 5.58 K/UL — SIGNIFICANT CHANGE UP (ref 1.8–7.4)
NEUTROPHILS NFR BLD AUTO: 82.1 % — HIGH (ref 43–77)
NRBC # BLD AUTO: 0 K/UL — SIGNIFICANT CHANGE UP (ref 0–0)
NRBC # FLD: 0 K/UL — SIGNIFICANT CHANGE UP (ref 0–0)
NRBC BLD AUTO-RTO: 0 /100 WBCS — SIGNIFICANT CHANGE UP (ref 0–0)
PLATELET # BLD AUTO: 191 K/UL — SIGNIFICANT CHANGE UP (ref 150–400)
RBC # BLD: 4.9 M/UL — SIGNIFICANT CHANGE UP (ref 3.8–5.2)
RBC # FLD: 11.7 % — SIGNIFICANT CHANGE UP (ref 10.3–14.5)
WBC # BLD: 6.8 K/UL — SIGNIFICANT CHANGE UP (ref 3.8–10.5)
WBC # FLD AUTO: 6.8 K/UL — SIGNIFICANT CHANGE UP (ref 3.8–10.5)

## 2025-03-21 PROCEDURE — 71046 X-RAY EXAM CHEST 2 VIEWS: CPT | Mod: 26

## 2025-03-21 PROCEDURE — 99285 EMERGENCY DEPT VISIT HI MDM: CPT

## 2025-03-21 PROCEDURE — 93010 ELECTROCARDIOGRAM REPORT: CPT

## 2025-03-21 RX ORDER — KETOROLAC TROMETHAMINE 30 MG/ML
15 INJECTION, SOLUTION INTRAMUSCULAR; INTRAVENOUS ONCE
Refills: 0 | Status: DISCONTINUED | OUTPATIENT
Start: 2025-03-21 | End: 2025-03-21

## 2025-03-21 RX ORDER — ACETAMINOPHEN 500 MG/5ML
975 LIQUID (ML) ORAL ONCE
Refills: 0 | Status: DISCONTINUED | OUTPATIENT
Start: 2025-03-21 | End: 2025-03-21

## 2025-03-21 RX ORDER — ACETAMINOPHEN 500 MG/5ML
975 LIQUID (ML) ORAL ONCE
Refills: 0 | Status: COMPLETED | OUTPATIENT
Start: 2025-03-21 | End: 2025-03-21

## 2025-03-21 RX ADMIN — Medication 1000 MILLILITER(S): at 23:27

## 2025-03-21 RX ADMIN — Medication 975 MILLIGRAM(S): at 23:28

## 2025-03-21 RX ADMIN — KETOROLAC TROMETHAMINE 15 MILLIGRAM(S): 30 INJECTION, SOLUTION INTRAMUSCULAR; INTRAVENOUS at 23:27

## 2025-03-21 NOTE — ED PROVIDER NOTE - CLINICAL SUMMARY MEDICAL DECISION MAKING FREE TEXT BOX
Sabrina FELICIANO:   Exam her vitals are stable nontoxic-appearing with physical exam as above DDx concern for possible ruptured ovarian cyst versus viral syndrome possible urinary tract infection, patient has mild diffuse lower abdominal tenderness worse on the left side, less concern for appendicitis or diverticulitis, patient also complaining of chest discomfort, less concern for ACS is young is otherwise healthy, presentation does not appear consistent with that of a PE as she is not tachycardic not hypoxic no prior history and no new lower extremity swelling or edema, however given the constellation of her symptoms we will start with checking basic blood work cardiac enzymes start with transvaginal ultrasound and chest x-ray, give meds as needed and reassess, if studies are initially nonactionable.  If the patient continues to not feel well will consider possible CT imaging will reassess and dispo accordingly pending study results.

## 2025-03-21 NOTE — ED PROVIDER NOTE - ST/T WAVE
Reason For Visit  DEANA MALDONADO is a new patient here today for a chief complaint of Ear cleaning.          History of Present Illness  says had a cold, resolved but right ear feels muffled, concerned about earwax impaction.   denies any other symptoms.    Symptoms include no fever, no ear pain, no cough, no sore throat and no headache.      Review of Systems    Const: Normal.   Allergy & Immunology: Normal.   ENT:. Per HPI.   Neck: Normal.   CV: Normal.   Resp: Normal.       Allergies  No Known Drug Allergies    Current Meds   1. No Reported Medications Recorded    Vitals  Signs   Recorded: 28Oct2018 02:06PM   Height: 6 ft   Weight: 250 lb   BMI Calculated: 33.91  BSA Calculated: 2.34  Temperature: 97.5 F, Oral  Heart Rate: 72, L Radial  Pulse Quality: Normal, L Radial  Respiration Quality: Normal  Respiration: 16    Physical Exam  Constitutional: alert, in no acute distress and current vital signs reviewed.   Head and Face ACW: no tenderness of facial sinuses.   ENT ACW: normal appearing outer ear. examination of the tympanic membrane showed normal landmarks. nasal mucosa moist and pinkno nasal discharge.   Neck ACW: supple neck.   Lymphatic ACW: no cervical lymphadenopathy.      Assessment  Fluid collection of middle ear (H65.90)   · Assessed By: KEKE MAJOR (Primary Care); Last Assessed: 28 Oct 2018    Plan      steam inhalation, saline sinus rinse daily  drink plenty of water.   f/u ENT or pcp if symptom persists after a week or if worsens.  Additional Notes:   Patient expresses understanding of the plan.    Medical compliance with plan discussed and risks of non-compliance reviewed.    If symptoms continue or worsen after 24 hrs, seek immediate care.    Patient education completed on disease process, etiology & prognosis.    Return to clinic as clinically indicated as discussed with patient who verbalized understanding of & agreement with the plan.   Thank you for visiting [facility]. You do not  require a follow-up visit. Please see your PCP for routine care. .       To view an electronic version of your office visit summary, please access your [employer] Patient Portal account. If you are not currently signed up and you provided us with your email address, please locate the email from \"[employer]\" and follow instructions to activate your account. Or you can visit [email or web address] to submit an online portal request form.              Signatures   Electronically signed by : BABATUNDE Jaime; Oct 28 2018  2:08PM CST    Electronically signed by : KEKE MAJOR NP; Oct 28 2018  2:22PM CST     no acute ischemia

## 2025-03-21 NOTE — ED ADULT TRIAGE NOTE - PAIN RATING/NUMBER SCALE (0-10): ACTIVITY
Is This A New Presentation, Or A Follow-Up?: Skin Lesions
What Type Of Note Output Would You Prefer (Optional)?: Standard Output
How Severe Is Your Skin Lesion?: mild
Has Your Skin Lesion Been Treated?: not been treated
9 (severe pain)

## 2025-03-21 NOTE — ED PROVIDER NOTE - WR ORDER DATE AND TIME 1
Patient evaluated with chief resident during AM rounds    STATUS POST:  Laparoscopic cholecystectomy      POST OPERATIVE DAY #: 3    Overnight Events: -n/v SANJU serous  SUBJECTIVE:     Denies Chest Pain, Difficulty breathing, Calf Pain, Headache, Dizziness    OBJECTIVE:  Vital Signs Last 24 Hrs  T(C): 36.8 (19 Feb 2024 05:25), Max: 36.9 (18 Feb 2024 20:34)  T(F): 98.2 (19 Feb 2024 05:25), Max: 98.4 (18 Feb 2024 20:34)  HR: 65 (19 Feb 2024 05:25) (65 - 79)  BP: 111/72 (19 Feb 2024 05:25) (107/75 - 130/81)  BP(mean): 85 (19 Feb 2024 05:25) (83 - 85)  RR: 17 (19 Feb 2024 05:25) (12 - 17)  SpO2: 96% (19 Feb 2024 05:25) (95% - 98%)    Parameters below as of 19 Feb 2024 05:25  Patient On (Oxygen Delivery Method): room air        I&O's Summary    18 Feb 2024 07:01  -  19 Feb 2024 07:00  --------------------------------------------------------  IN: 745 mL / OUT: 2205 mL / NET: -1460 mL        Physical Exam:  General Appearance: Appears well, NAD  Pulmonary: Nonlabored breathing, no respiratory distress  Cardiovascular: NSR  Abdomen: Soft, nondistneded, appropriate incisional tenderness, dressings clean and dry and intact  Extremities: WWP, SCD's in place     LABS:                        12.4   9.91  )-----------( 352      ( 18 Feb 2024 05:30 )             38.6     02-18    136  |  103  |  5<L>  ----------------------------<  92  4.4   |  25  |  0.53    Ca    8.8      18 Feb 2024 05:30  Phos  3.4     02-18  Mg     2.1     02-18    TPro  7.0  /  Alb  3.5  /  TBili  3.4<H>  /  DBili  x   /  AST  257<H>  /  ALT  377<H>  /  AlkPhos  251<H>  02-18    PT/INR - ( 18 Feb 2024 05:30 )   PT: 12.3 sec;   INR: 1.08          PTT - ( 18 Feb 2024 05:30 )  PTT:34.9 sec  Urinalysis Basic - ( 18 Feb 2024 05:30 )    Color: x / Appearance: x / SG: x / pH: x  Gluc: 92 mg/dL / Ketone: x  / Bili: x / Urobili: x   Blood: x / Protein: x / Nitrite: x   Leuk Esterase: x / RBC: x / WBC x   Sq Epi: x / Non Sq Epi: x / Bacteria: x       Patient evaluated with chief resident during AM rounds    STATUS POST:  Laparoscopic cholecystectomy      POST OPERATIVE DAY #: 3    Overnight Events: -n/v SANJU serous  SUBJECTIVE: AM patient states night went well, tolerated diet, -n/-v. Abd pain improving. AMbulatory.     Denies Chest Pain, Difficulty breathing, Calf Pain, Headache, Dizziness    OBJECTIVE:  Vital Signs Last 24 Hrs  T(C): 36.8 (19 Feb 2024 05:25), Max: 36.9 (18 Feb 2024 20:34)  T(F): 98.2 (19 Feb 2024 05:25), Max: 98.4 (18 Feb 2024 20:34)  HR: 65 (19 Feb 2024 05:25) (65 - 79)  BP: 111/72 (19 Feb 2024 05:25) (107/75 - 130/81)  BP(mean): 85 (19 Feb 2024 05:25) (83 - 85)  RR: 17 (19 Feb 2024 05:25) (12 - 17)  SpO2: 96% (19 Feb 2024 05:25) (95% - 98%)    Parameters below as of 19 Feb 2024 05:25  Patient On (Oxygen Delivery Method): room air        I&O's Summary    18 Feb 2024 07:01  -  19 Feb 2024 07:00  --------------------------------------------------------  IN: 745 mL / OUT: 2205 mL / NET: -1460 mL        Physical Exam:  General Appearance: Appears well, NAD  Pulmonary: Nonlabored breathing, no respiratory distress  Cardiovascular: NSR  Abdomen: Soft, nondistneded, appropriate incisional tenderness, dressings clean and dry and intact. SANJU SS  Extremities: WWP, SCD's in place     LABS:                        12.4   9.91  )-----------( 352      ( 18 Feb 2024 05:30 )             38.6     02-18    136  |  103  |  5<L>  ----------------------------<  92  4.4   |  25  |  0.53    Ca    8.8      18 Feb 2024 05:30  Phos  3.4     02-18  Mg     2.1     02-18    TPro  7.0  /  Alb  3.5  /  TBili  3.4<H>  /  DBili  x   /  AST  257<H>  /  ALT  377<H>  /  AlkPhos  251<H>  02-18    PT/INR - ( 18 Feb 2024 05:30 )   PT: 12.3 sec;   INR: 1.08          PTT - ( 18 Feb 2024 05:30 )  PTT:34.9 sec  Urinalysis Basic - ( 18 Feb 2024 05:30 )    Color: x / Appearance: x / SG: x / pH: x  Gluc: 92 mg/dL / Ketone: x  / Bili: x / Urobili: x   Blood: x / Protein: x / Nitrite: x   Leuk Esterase: x / RBC: x / WBC x   Sq Epi: x / Non Sq Epi: x / Bacteria: x       21-Mar-2025 22:13

## 2025-03-21 NOTE — ED PROVIDER NOTE - NSFOLLOWUPINSTRUCTIONS_ED_ALL_ED_FT
Take cefpodoxime once in AM and once in PM for 7 days.     Seek immediate medical assistance for any new or worsening symptoms. If you have issues obtaining follow up, please call: [phone] or [phone]  to obtain a doctor or specialist who takes your insurance in your area.     Follow up with your GYN doctor.

## 2025-03-21 NOTE — ED PROVIDER NOTE - PATIENT PORTAL LINK FT
You can access the FollowMyHealth Patient Portal offered by [facility] by registering at the following website: [email or web address]. By joining [employer]’s FollowMyHealth portal, you will also be able to view your health information using other applications (apps) compatible with our system.

## 2025-03-21 NOTE — ED ADULT NURSE NOTE - ISOLATION TYPE:
Behavioral Health Consultation  [facility] Quyen Goncalves PsyD  Psychologist  8/4/2020  9:35 AM      Time spent with Patient: 23 minutes  This is patient's [de-identified] first [facility] appointment. Reason for Consult:  Depression, anxiety, stress  Referring Provider: Colby Sexton MD    TELEHEALTH VISIT -- Audio Only- technical problems with magalie (During [id number]-76 public health emergency)  }  Pursuant to the emergency declaration under the [address] [place] [address] waiver authority and the [employer] Act, this Virtual Visit was conducted, with patient's consent, to reduce the patient's risk of exposure to COVID-19 and provide continuity of care for an established patient. Services were provided through a video synchronous discussion virtually to substitute for in-person clinic visit. Pt gave verbal informed consent to participate in telehealth services. Conducted a risk-benefit analysis and determined that the patient's presenting problems are consistent with the use of telepsychology. Determined that the patient has sufficient knowledge and skills in the use of technology enabling them to adequately benefit from telepsychology. It was determined that this patient was able to be properly treated without an in-person session. Patient verified that they were currently located at the [facility] address that was provided during registration.     Verified the following information:  Patient's identification: Yes  Patient location: [address] [place] [id number]   Patient's call back number: [phone]   Patient's emergency contact's name and number, as well as permission to contact them if needed: Extended Emergency Contact Information  Primary Emergency Contact: Anya Strange [address] [place] [address] Phone: [phone]  Relation: Other  Secondary Emergency Contact: [address] [place] [address] Phone: [phone]  Relation: Child     Provider location: [facility]:  During the last pt set goals to 1) continue to focus on immediate stressors 2) see dietician on Thursday 3) return for f/u in 2 weeks     Pt reports she is \"ok. \" Had pulmonary visit yesterday. [place] her  who hasn't called back has been on vacation and gets back today. Feels relieved there was a reason he didn't call back. Mood overall \"ok. \" Met with dietician and found it helpful.         O:  MSE:     Appearance: Not assessed  Attitude: cooperative and friendly  Consciousness: alert  Orientation: oriented to person, place, time, general circumstance  Memory: recent and remote memory intact  Attention/Concentration: intact during session  Psychomotor Activity: Not assessed  Eye Contact: Not assessed  Speech: normal rate and volume, well-articulated  Mood: \"OK\"  Affect: euthymic and congruent  Perception: within normal limits  Thought Content: within normal limits  Thought Process: logical, coherent and goal-directed  Insight: fair  Judgment: intact  Ability to understand instructions: Yes  Ability to respond meaningfully: Yes  Morbid Ideation: no   Suicide Assessment: no suicidal ideation, plan, or intent  Homicidal Ideation: no        History:    Medications:   Current Outpatient Medications   Medication Sig Dispense Refill    hydroxychloroquine (PLAQUENIL) 200 MG tablet TAKE 1 TABLET BY MOUTH TWICE DAILY 60 tablet 2    furosemide (LASIX) 40 MG tablet Take 40 mg by mouth 2 times daily Patient caregiver has started giving patient again, due to severe swelling      azaTHIOprine (IMURAN) 50 MG tablet TAKE 1 TABLETS BY MOUTH DAILY 90 tablet 0    Cholecalciferol (VITAMIN D3) 1.25 MG (67807 UT) CAPS TAKE 1 CAPSULE BY MOUTH EVERY OTHER WEEK 4 capsule 2    predniSONE (DELTASONE) 2.5 MG tablet Take 1 tab daily with 5 mg tab for 7.5 mg (Patient not taking: Reported on 7/13/2020) 30 tablet 3    mirtazapine (REMERON) 7.5 MG tablet Take None 1 tablet by mouth nightly 30 tablet 2    pantoprazole (PROTONIX) 20 MG tablet TAKE 1 TABLET BY MOUTH TWICE DAILY 60 tablet 2    rosuvastatin (CRESTOR) 20 MG tablet TAKE 1 TABLET BY MOUTH DAILY 30 tablet 5    clopidogrel (PLAVIX) 75 MG tablet TAKE 1 TABLET BY MOUTH DAILY 30 tablet 2    carvedilol (COREG) 6.25 MG tablet Take 1 tablet by mouth 2 times daily 180 tablet 2    ondansetron (ZOFRAN) 4 MG tablet TAKE 1 TABLET BY MOUTH EVERY 12 HOURS AS NEEDED FOR NAUSEA OR VOMITING 30 tablet 3    buPROPion (WELLBUTRIN XL) 300 MG extended release tablet TAKE 1 TABLET BY MOUTH EVERY MORNING 90 tablet 1    predniSONE (DELTASONE) 5 MG tablet Take 3 tablets by mouth daily for 14 days, THEN 2 tablets daily. TAKE 2 TABLETS BY MOUTH DAILY. (Patient taking differently: 5mg) 90 tablet 3    docusate (COLACE, DULCOLAX) 100 MG CAPS Take 100 mg by mouth 2 times daily 60 capsule 3    clobetasol (TEMOVATE) 0.05 % external solution Apply topically 2 times daily Apply topically 2 times daily.  acetaminophen 650 MG TABS Take 650 mg by mouth every 6 hours as needed for Pain or Fever (Fever >100.5 F (38 C)). 60 tablet 1     No current facility-administered medications for this visit.         Social History:   Social History     Socioeconomic History    Marital status:      Spouse name: Not on file    Number of children: 1    Years of education: 15    Highest education level: Not on file   Occupational History    Occupation: Retired   Social Needs    Financial resource strain: Not on file    Food insecurity     Worry: Not on file     Inability: Not on file   [employer] needs     Medical: Not on file     Non-medical: Not on file   Tobacco Use    Smoking status: Former Smoker     Packs/day: 0.50     Years: 30.00     Pack years: 15.00     Types: Cigarettes     Start date:      Last attempt to quit: 2014     Years since quittin.8    Smokeless tobacco: Never Used   Substance and Sexual Activity    Alcohol use: Yes     Alcohol/week: 0.0 standard drinks     Comment: 2 cocktails on weekends    Drug use: No    Sexual activity: Not Currently   Lifestyle    Physical activity     Days per week: Not on file     Minutes per session: Not on file    Stress: Not on file   Relationships    Social connections     Talks on phone: Not on file     Gets together: Not on file     Attends Che[employer] service: Not on file     Active member of club or organization: Not on file     Attends meetings of clubs or organizations: Not on file     Relationship status: Not on file    Intimate partner violence     Fear of current or ex partner: Not on file     Emotionally abused: Not on file     Physically abused: Not on file     Forced sexual activity: Not on file   Other Topics Concern    Not on file   Social History Narrative    Not on file       TOBACCO:   reports that she quit smoking about 5 years ago. Her smoking use included cigarettes. She started smoking about 55 years ago. She has a 15.00 pack-year smoking history. She has never used smokeless tobacco.  ETOH:   reports current alcohol use. Family History:   Family History   Problem Relation Age of Onset    High Blood Pressure Brother          A:  Ms. Tami Castañeda mood has been somewhat improved since the last visit. Many stressors have improved. She continues to be friendly and engaged and responds positively to behavioral interventions. She was assisted in scheduling visit with dietician.       Diagnosis:    MDD, Recurrent, Moderate  Anxiety  Stress      Plan:  Pt interventions:  [place]-setting to identify pt's primary goals for [facility] visit / overall health and Supportive techniques,  ACT interventions and reinforced pt's skill use, treatment planning    Pt Behavioral Change Plan:  Pt set goals to 1) return for f/u in 3 weeks

## 2025-03-21 NOTE — ED ADULT TRIAGE NOTE - CHIEF COMPLAINT QUOTE
C/o multiple medical complaints starting 11am. endorsing chest tightness, abd pain, generalized weakness, myalgias, urinary frequency. Hx PCOS

## 2025-03-21 NOTE — ED ADULT NURSE NOTE - OBJECTIVE STATEMENT
Patient received in intake room 5. Patient A&Ox3 and ambulatory at baseline. Patient presents to the ED c/o flu like symptoms and abdominal pain x2 days. Patient denies significant phx. Patient states for approximately 2 days, she has been experiencing generalized body aches, abdominal pain. Patient denies headache, dizziness, lightheadedness, nausea/vomiting, fever/chills, and pain. Patient offers no complaints at this time. Respirations even and unlabored, no signs/symptoms of acute distress; patient denies dyspnea, shortness of breath, and chest pain. 20G IV placed to right AC, labs collected and sent. Patient is stable at this time. Steady gait observed.

## 2025-03-21 NOTE — ED PROVIDER NOTE - PHYSICAL EXAMINATION
Sabrina FELICIANO:  VITALS: Initial triage and subsequent vitals have been reviewed by me.  GEN APPEARANCE: Alert, cooperative. Non-toxic appearing. Well appearing. NAD.  HEAD: Atraumatic, normocephalic   EYES: PERRLa, EOMI, vision grossly intact.   EARS: Gross hearing intact.   NOSE: No nasal discharge, no external evidence of epistaxis.   NECK: Supple  CV: RRR, S1S2, no c/r/m/g. No cyanosis. Extremities warm, well perfused. Cap refill <2 seconds. No bruits.   LUNGS: CTAB. No wheezing. No rales. No rhonchi. No diminished breath sounds.   ABDOMEN: diffuse lower abdominal tenderness L>R   MSK/EXT: Spine appears normal, no spine point tenderness. No CVA ttp. Normal muscular development. Pelvis stable. No obvious joint or bony deformity, no peripheral edema.   NEURO: Alert, follows commands. Weight bearing normal. Speech normal. Sensation and motor normal x4 extremities.   SKIN: Normal color for race, warm, dry and intact. No evidence of rash.  PSYCH: Normal mood and affect.

## 2025-03-21 NOTE — ED PROVIDER NOTE - OBJECTIVE STATEMENT
Sabrina FELICIANO: 39-year-old female, history of PCOS, on OCPs, no prior history of DVT or PE, presents with multiple medical complaints including mild diffuse lower abdominal discomfort worse to the left side, associate with some nausea no vomiting diffuse bodyaches chest pain mild trouble breathing cough that resolved, lightheadedness and dizziness that appears intermittent and positional, worsening over the last few days, patient reports an episode of brownish vaginal discharge that is spontaneously resolved, no bowel complaints, no urinary complaints no hemoptysis, no lower extremity swelling or edema, was seen urgent care was sent to the ED for evaluation given concern for possible intra-abdominal infection.  No rashes no sick contacts

## 2025-03-21 NOTE — ED ADULT NURSE NOTE - CAS EDP DISCH TYPE
Pt heart rhythm went into vtach/torsades on the monitor again. Pt found to be unconscious, consciousness regained after apprx. 1 minute. Pacer pads still in place, crash cart at the bedside. Pt heart rhythm went into sustained vtach/torsades on the monitor. Pt found to be unconscious with vomiting present. Pt regained consciousness after apprx. 1 minute. Pt. becoming very agitated and shivering. Rectal temp 102.0 DELIA Vitale made aware and sepsis protocol insitu. Tylenol per rectum given as ordered, and labs drawn and sent.  Zosyn and Vanco to be given a Home

## 2025-03-22 VITALS
RESPIRATION RATE: 17 BRPM | HEART RATE: 80 BPM | SYSTOLIC BLOOD PRESSURE: 110 MMHG | TEMPERATURE: 99 F | OXYGEN SATURATION: 98 % | DIASTOLIC BLOOD PRESSURE: 70 MMHG

## 2025-03-22 LAB
ALBUMIN SERPL ELPH-MCNC: 4.8 G/DL — SIGNIFICANT CHANGE UP (ref 3.3–5)
ALP SERPL-CCNC: 55 U/L — SIGNIFICANT CHANGE UP (ref 40–120)
ALT FLD-CCNC: 14 U/L — SIGNIFICANT CHANGE UP (ref 4–33)
ANION GAP SERPL CALC-SCNC: 14 MMOL/L — SIGNIFICANT CHANGE UP (ref 7–14)
APPEARANCE UR: CLEAR — SIGNIFICANT CHANGE UP
AST SERPL-CCNC: 19 U/L — SIGNIFICANT CHANGE UP (ref 4–32)
BACTERIA # UR AUTO: NEGATIVE /HPF — SIGNIFICANT CHANGE UP
BILIRUB SERPL-MCNC: 1 MG/DL — SIGNIFICANT CHANGE UP (ref 0.2–1.2)
BILIRUB UR-MCNC: ABNORMAL
BUN SERPL-MCNC: 15 MG/DL — SIGNIFICANT CHANGE UP (ref 7–23)
CALCIUM SERPL-MCNC: 9 MG/DL — SIGNIFICANT CHANGE UP (ref 8.4–10.5)
CAST: 0 /LPF — SIGNIFICANT CHANGE UP (ref 0–4)
CHLORIDE SERPL-SCNC: 101 MMOL/L — SIGNIFICANT CHANGE UP (ref 98–107)
CO2 SERPL-SCNC: 21 MMOL/L — LOW (ref 22–31)
COLOR SPEC: ABNORMAL
CREAT SERPL-MCNC: 0.74 MG/DL — SIGNIFICANT CHANGE UP (ref 0.5–1.3)
DIFF PNL FLD: NEGATIVE — SIGNIFICANT CHANGE UP
EGFR: 106 ML/MIN/1.73M2 — SIGNIFICANT CHANGE UP
EGFR: 106 ML/MIN/1.73M2 — SIGNIFICANT CHANGE UP
GLUCOSE SERPL-MCNC: 100 MG/DL — HIGH (ref 70–99)
GLUCOSE UR QL: NEGATIVE MG/DL — SIGNIFICANT CHANGE UP
HCG SERPL-ACNC: <1 MIU/ML — SIGNIFICANT CHANGE UP
HIV 1+2 AB+HIV1 P24 AG SERPL QL IA: SIGNIFICANT CHANGE UP
KETONES UR-MCNC: NEGATIVE MG/DL — SIGNIFICANT CHANGE UP
LEUKOCYTE ESTERASE UR-ACNC: ABNORMAL
LIDOCAIN IGE QN: 18 U/L — SIGNIFICANT CHANGE UP (ref 7–60)
NITRITE UR-MCNC: POSITIVE
PH UR: 5.5 — SIGNIFICANT CHANGE UP (ref 5–8)
POTASSIUM SERPL-MCNC: 4.4 MMOL/L — SIGNIFICANT CHANGE UP (ref 3.5–5.3)
POTASSIUM SERPL-SCNC: 4.4 MMOL/L — SIGNIFICANT CHANGE UP (ref 3.5–5.3)
PROT SERPL-MCNC: 7.4 G/DL — SIGNIFICANT CHANGE UP (ref 6–8.3)
PROT UR-MCNC: SIGNIFICANT CHANGE UP MG/DL
RBC CASTS # UR COMP ASSIST: 1 /HPF — SIGNIFICANT CHANGE UP (ref 0–4)
SODIUM SERPL-SCNC: 136 MMOL/L — SIGNIFICANT CHANGE UP (ref 135–145)
SP GR SPEC: 1.03 — HIGH (ref 1–1.03)
SQUAMOUS # UR AUTO: 2 /HPF — SIGNIFICANT CHANGE UP (ref 0–5)
T PALLIDUM AB TITR SER: NEGATIVE — SIGNIFICANT CHANGE UP
TROPONIN T, HIGH SENSITIVITY RESULT: <6 NG/L — SIGNIFICANT CHANGE UP
UROBILINOGEN FLD QL: 1 MG/DL — SIGNIFICANT CHANGE UP (ref 0.2–1)
WBC UR QL: 1 /HPF — SIGNIFICANT CHANGE UP (ref 0–5)

## 2025-03-22 PROCEDURE — 93975 VASCULAR STUDY: CPT | Mod: 26

## 2025-03-22 PROCEDURE — 76830 TRANSVAGINAL US NON-OB: CPT | Mod: 26

## 2025-03-22 RX ORDER — ONDANSETRON HCL/PF 4 MG/2 ML
4 VIAL (ML) INJECTION ONCE
Refills: 0 | Status: COMPLETED | OUTPATIENT
Start: 2025-03-22 | End: 2025-03-22

## 2025-03-22 RX ORDER — CEFTRIAXONE 500 MG/1
1000 INJECTION, POWDER, FOR SOLUTION INTRAMUSCULAR; INTRAVENOUS ONCE
Refills: 0 | Status: COMPLETED | OUTPATIENT
Start: 2025-03-22 | End: 2025-03-22

## 2025-03-22 RX ORDER — CEFPODOXIME PROXETIL 200 MG/1
1 TABLET, FILM COATED ORAL
Qty: 14 | Refills: 0
Start: 2025-03-22 | End: 2025-03-28

## 2025-03-22 RX ADMIN — CEFTRIAXONE 100 MILLIGRAM(S): 500 INJECTION, POWDER, FOR SOLUTION INTRAMUSCULAR; INTRAVENOUS at 01:03

## 2025-03-22 RX ADMIN — Medication 4 MILLIGRAM(S): at 02:06

## 2025-03-22 NOTE — ED ADULT NURSE REASSESSMENT NOTE - NS ED NURSE REASSESS COMMENT FT1
Resting comfortably on stretcher. denies any pain, not in acute distress. alert and oriented x 4, safety maintained. nursing care is ongoing.

## 2025-03-22 NOTE — ED ADULT NURSE REASSESSMENT NOTE - NS ED NURSE REASSESS COMMENT FT1
Respirations equal and adequate. Safety measures in place, call bell within reach. Patient stable upon assessment.   pt endorsed nausea, pt medicated as per EMR.

## 2025-03-23 LAB
CULTURE RESULTS: SIGNIFICANT CHANGE UP
SPECIMEN SOURCE: SIGNIFICANT CHANGE UP
